# Patient Record
Sex: FEMALE | Employment: OTHER | URBAN - METROPOLITAN AREA
[De-identification: names, ages, dates, MRNs, and addresses within clinical notes are randomized per-mention and may not be internally consistent; named-entity substitution may affect disease eponyms.]

---

## 2019-08-22 LAB
EXT 24 HR UR METANEPHRINE: ABNORMAL
EXT 24 HR UR NORMETANEPHRINE: ABNORMAL
EXT 24 HR UR NORMETANEPHRINE: ABNORMAL
EXT 25 HYDROXY VIT D2: ABNORMAL
EXT 25 HYDROXY VIT D3: ABNORMAL
EXT 5 HIAA 24 HR URINE: ABNORMAL
EXT 5 HIAA BLOOD: ABNORMAL
EXT ACTH: ABNORMAL
EXT AFP: ABNORMAL
EXT ALBUMIN: ABNORMAL
EXT ALKALINE PHOSPHATASE: 292 IU/L (ref 46–116)
EXT ALT: 38 IU/L (ref 12–78)
EXT AMYLASE: ABNORMAL
EXT ANTI ISLET CELL AB: ABNORMAL
EXT ANTI PARIETAL CELL AB: ABNORMAL
EXT ANTI THYROID AB: ABNORMAL
EXT AST: 26 IU/L (ref 15–37)
EXT BILIRUBIN DIRECT: ABNORMAL MG/DL
EXT BILIRUBIN TOTAL: 0.4 MG/DL (ref 0.2–1)
EXT BK VIRUS DNA QN PCR: ABNORMAL
EXT BUN: 32 MG/DL (ref 7–18)
EXT C PEPTIDE: ABNORMAL
EXT CA 125: ABNORMAL
EXT CA 19-9: ABNORMAL
EXT CA 27-29: ABNORMAL
EXT CALCITONIN: ABNORMAL
EXT CALCIUM: 9.9 MG/DL (ref 9.5–10.1)
EXT CEA: ABNORMAL
EXT CHLORIDE: ABNORMAL
EXT CHOLESTEROL: ABNORMAL
EXT CHROMOGRANIN A: ABNORMAL
EXT CO2: 30 MMOL/L (ref 21–32)
EXT CREATININE UA: ABNORMAL
EXT CREATININE: 1.9 MG/DL (ref 0.6–1.3)
EXT CYCLOSPORONE LEVEL: ABNORMAL
EXT DOPAMINE: ABNORMAL
EXT EBV DNA BY PCR: ABNORMAL
EXT EPINEPHRINE: ABNORMAL
EXT FOLATE: ABNORMAL
EXT FREE T3: ABNORMAL
EXT FREE T4: ABNORMAL
EXT FSH: ABNORMAL
EXT GASTRIN RELEASING PEPTIDE: ABNORMAL
EXT GASTRIN RELEASING PEPTIDE: ABNORMAL
EXT GASTRIN: ABNORMAL
EXT GGT: ABNORMAL
EXT GHRELIN: ABNORMAL
EXT GLUCAGON: ABNORMAL
EXT GLUCOSE: 88 MG/DL (ref 74–106)
EXT GROWTH HORMONE: ABNORMAL
EXT HCV RNA QUANT PCR: ABNORMAL
EXT HDL: ABNORMAL
EXT HEMATOCRIT: 35 % (ref 37–47)
EXT HEMOGLOBIN A1C: ABNORMAL %
EXT HEMOGLOBIN: 11.2 GM/DL (ref 12–16)
EXT HISTAMINE 24 HR URINE: ABNORMAL
EXT HISTAMINE: ABNORMAL
EXT IGF-1: ABNORMAL
EXT IMMUNKNOW (NON-STIMULATED): ABNORMAL
EXT IMMUNKNOW (STIMULATED): ABNORMAL
EXT INR: ABNORMAL
EXT INSULIN: ABNORMAL
EXT LANREOTIDE LEVEL: ABNORMAL
EXT LDH, TOTAL: ABNORMAL
EXT LDL CHOLESTEROL: ABNORMAL
EXT LIPASE: 222 IU/L (ref 73–393)
EXT MAGNESIUM: ABNORMAL
EXT METANEPHRINE FREE PLASMA: ABNORMAL
EXT MOTILIN: ABNORMAL
EXT NEUROKININ A CAMB: ABNORMAL
EXT NEUROKININ A ISI: ABNORMAL
EXT NEUROTENSIN: ABNORMAL
EXT NOREPINEPHRINE: ABNORMAL
EXT NORMETANEPHRINE: ABNORMAL
EXT NSE: ABNORMAL
EXT OCTREOTIDE LEVEL: ABNORMAL
EXT PANCREASTATIN CAMB: ABNORMAL
EXT PANCREASTATIN ISI: ABNORMAL
EXT PANCREATIC POLYPEPTIDE: ABNORMAL
EXT PHOSPHORUS: ABNORMAL
EXT PLATELETS: 293 K/UL (ref 140–450)
EXT POTASSIUM: 4.2 MMOL/L (ref 3.5–5.2)
EXT PROGRAF LEVEL: ABNORMAL
EXT PROLACTIN: ABNORMAL
EXT PROTEIN TOTAL: 8.8 G/DL (ref 6.4–8.2)
EXT PROTEIN UA: ABNORMAL
EXT PT: ABNORMAL
EXT PTH, INTACT: ABNORMAL
EXT PTT: ABNORMAL
EXT RAPAMUNE LEVEL: ABNORMAL
EXT SEROTONIN: ABNORMAL
EXT SODIUM: 140 MMOL/L (ref 136–145)
EXT SOMATOSTATIN: ABNORMAL
EXT SUBSTANCE P: ABNORMAL
EXT TRIGLYCERIDES: ABNORMAL
EXT TRYPTASE: ABNORMAL
EXT TSH: ABNORMAL
EXT URIC ACID: ABNORMAL
EXT URINE AMYLASE U/HR: ABNORMAL
EXT URINE AMYLASE U/L: ABNORMAL
EXT VASOACTIVE INTESTINAL POLYPEPTIDE: ABNORMAL
EXT VITAMIN B12: ABNORMAL
EXT VMA 24 HR URINE: ABNORMAL
EXT WBC: 11.1 K/UL (ref 4.8–10.8)
NEURON SPECIFIC ENOLASE: ABNORMAL

## 2019-09-11 LAB
EXT 24 HR UR METANEPHRINE: ABNORMAL
EXT 24 HR UR NORMETANEPHRINE: ABNORMAL
EXT 24 HR UR NORMETANEPHRINE: ABNORMAL
EXT 25 HYDROXY VIT D2: ABNORMAL
EXT 25 HYDROXY VIT D3: ABNORMAL
EXT 5 HIAA 24 HR URINE: ABNORMAL
EXT 5 HIAA BLOOD: ABNORMAL
EXT ACTH: ABNORMAL
EXT AFP: ABNORMAL
EXT ALBUMIN: 3.3
EXT ALKALINE PHOSPHATASE: 284
EXT ALT: 23
EXT AMYLASE: ABNORMAL
EXT ANTI ISLET CELL AB: ABNORMAL
EXT ANTI PARIETAL CELL AB: ABNORMAL
EXT ANTI THYROID AB: ABNORMAL
EXT AST: 33
EXT BILIRUBIN DIRECT: ABNORMAL
EXT BILIRUBIN TOTAL: 0.4
EXT BK VIRUS DNA QN PCR: ABNORMAL
EXT BUN: 33
EXT C PEPTIDE: ABNORMAL
EXT CA 125: ABNORMAL
EXT CA 19-9: 81
EXT CA 27-29: ABNORMAL
EXT CALCITONIN: ABNORMAL
EXT CALCIUM: 8.3
EXT CEA: 5.4
EXT CHLORIDE: 95
EXT CHOLESTEROL: ABNORMAL
EXT CHROMOGRANIN A: ABNORMAL
EXT CO2: 32
EXT CREATININE UA: ABNORMAL
EXT CREATININE: 2.1 MG/DL
EXT CYCLOSPORONE LEVEL: ABNORMAL
EXT DOPAMINE: ABNORMAL
EXT EBV DNA BY PCR: ABNORMAL
EXT EPINEPHRINE: ABNORMAL
EXT FOLATE: ABNORMAL
EXT FREE T3: ABNORMAL
EXT FREE T4: ABNORMAL
EXT FSH: ABNORMAL
EXT GASTRIN RELEASING PEPTIDE: ABNORMAL
EXT GASTRIN RELEASING PEPTIDE: ABNORMAL
EXT GASTRIN: ABNORMAL
EXT GGT: ABNORMAL
EXT GHRELIN: ABNORMAL
EXT GLUCAGON: ABNORMAL
EXT GLUCOSE: 164
EXT GROWTH HORMONE: ABNORMAL
EXT HCV RNA QUANT PCR: ABNORMAL
EXT HDL: ABNORMAL
EXT HEMATOCRIT: 32.7
EXT HEMOGLOBIN A1C: ABNORMAL
EXT HEMOGLOBIN: 10.4
EXT HISTAMINE 24 HR URINE: ABNORMAL
EXT HISTAMINE: ABNORMAL
EXT IGF-1: ABNORMAL
EXT IMMUNKNOW (NON-STIMULATED): ABNORMAL
EXT IMMUNKNOW (STIMULATED): ABNORMAL
EXT INR: ABNORMAL
EXT INSULIN: ABNORMAL
EXT LANREOTIDE LEVEL: ABNORMAL
EXT LDH, TOTAL: ABNORMAL
EXT LDL CHOLESTEROL: ABNORMAL
EXT LIPASE: ABNORMAL
EXT MAGNESIUM: ABNORMAL
EXT METANEPHRINE FREE PLASMA: ABNORMAL
EXT MOTILIN: ABNORMAL
EXT NEUROKININ A CAMB: ABNORMAL
EXT NEUROKININ A ISI: ABNORMAL
EXT NEUROTENSIN: ABNORMAL
EXT NOREPINEPHRINE: ABNORMAL
EXT NORMETANEPHRINE: ABNORMAL
EXT NSE: ABNORMAL
EXT OCTREOTIDE LEVEL: ABNORMAL
EXT PANCREASTATIN CAMB: ABNORMAL
EXT PANCREASTATIN ISI: ABNORMAL
EXT PANCREATIC POLYPEPTIDE: ABNORMAL
EXT PHOSPHORUS: ABNORMAL
EXT PLATELETS: 287
EXT POTASSIUM: 3.1
EXT PROGRAF LEVEL: ABNORMAL
EXT PROLACTIN: ABNORMAL
EXT PROTEIN TOTAL: 7.5
EXT PROTEIN UA: ABNORMAL
EXT PT: ABNORMAL
EXT PTH, INTACT: ABNORMAL
EXT PTT: ABNORMAL
EXT RAPAMUNE LEVEL: ABNORMAL
EXT SEROTONIN: ABNORMAL
EXT SODIUM: 139 MMOL/L
EXT SOMATOSTATIN: ABNORMAL
EXT SUBSTANCE P: ABNORMAL
EXT TRIGLYCERIDES: ABNORMAL
EXT TRYPTASE: ABNORMAL
EXT TSH: ABNORMAL
EXT URIC ACID: ABNORMAL
EXT URINE AMYLASE U/HR: ABNORMAL
EXT URINE AMYLASE U/L: ABNORMAL
EXT VASOACTIVE INTESTINAL POLYPEPTIDE: ABNORMAL
EXT VITAMIN B12: ABNORMAL
EXT VMA 24 HR URINE: ABNORMAL
EXT WBC: 9.03
NEURON SPECIFIC ENOLASE: ABNORMAL

## 2019-09-13 ENCOUNTER — TELEPHONE (OUTPATIENT)
Dept: NEUROLOGY | Facility: HOSPITAL | Age: 79
End: 2019-09-13

## 2019-09-13 DIAGNOSIS — C7A.8 PRIMARY MALIGNANT NEUROENDOCRINE NEOPLASM OF PANCREAS: Primary | ICD-10-CM

## 2019-09-13 RX ORDER — DICYCLOMINE HYDROCHLORIDE 20 MG/1
20 TABLET ORAL EVERY 6 HOURS
Status: ON HOLD | COMMUNITY
End: 2019-09-30 | Stop reason: HOSPADM

## 2019-09-13 RX ORDER — NIFEDIPINE 30 MG/1
30 TABLET, FILM COATED, EXTENDED RELEASE ORAL DAILY
COMMUNITY

## 2019-09-13 RX ORDER — BUDESONIDE AND FORMOTEROL FUMARATE DIHYDRATE 160; 4.5 UG/1; UG/1
2 AEROSOL RESPIRATORY (INHALATION) EVERY 12 HOURS
COMMUNITY

## 2019-09-13 RX ORDER — HYDRALAZINE HYDROCHLORIDE 10 MG/1
10 TABLET, FILM COATED ORAL 3 TIMES DAILY
COMMUNITY
End: 2019-10-14

## 2019-09-13 RX ORDER — LOSARTAN POTASSIUM 100 MG/1
100 TABLET ORAL DAILY
COMMUNITY

## 2019-09-13 RX ORDER — FUROSEMIDE 20 MG/1
20 TABLET ORAL 2 TIMES DAILY
COMMUNITY

## 2019-09-13 RX ORDER — AMITRIPTYLINE HYDROCHLORIDE 25 MG/1
25 TABLET, FILM COATED ORAL NIGHTLY PRN
COMMUNITY

## 2019-09-13 RX ORDER — HYDROCODONE BITARTRATE AND ACETAMINOPHEN 5; 325 MG/1; MG/1
1 TABLET ORAL EVERY 6 HOURS PRN
COMMUNITY

## 2019-09-13 RX ORDER — CARVEDILOL 6.25 MG/1
6.25 TABLET ORAL 2 TIMES DAILY WITH MEALS
Status: ON HOLD | COMMUNITY
End: 2019-09-30 | Stop reason: HOSPADM

## 2019-09-13 RX ORDER — CALCITRIOL 0.5 UG/1
0.5 CAPSULE ORAL DAILY
COMMUNITY

## 2019-09-13 NOTE — TELEPHONE ENCOUNTER
New PNET referral from Dr. Thacker.  She presented to the ER with abdominal pain.  CT scan without contrast showed an ill defined soft tissue mass in the richard hepatis and pancreatic region measuring 4.8 cm.  Pt reports she had a panc bx via EUS in [place] at [facility].  No path avail yet.  She is also scheduled for a brain MRI and another scan CT chest next week.  Ordered ga 68/ pet/ct scan and path re read per protocol.  Sent to Prabha for scheduling asap.

## 2019-09-13 NOTE — TELEPHONE ENCOUNTER
----- Message from Nicole Carrero sent at 9/11/2019 10:58 AM CDT -----  Regarding: New Patient  Contact: [phone]  New Patient: Yomaira    Referred By: Dr. Randall Thacker    Why do you need to be seen? Neuro Endocrine Tumor of the Pancreas    Which doctor are you requesting? First Available    You may contact pt back to schedule at: [phone] or [phone]

## 2019-09-19 NOTE — PROGRESS NOTES
[facility]OLAN[facility]:  [facility]  A collaboration between [facility] and [facility]      PATIENT: Nidia Martel  MRN: [id number]  DATE: 9/19/2019    Subjective:      Chief Complaint: Consult for pancreatic neuroendocrine tumor.  Review most recent imaging and blood work results. Establish surveillance and treatment plan.     Overview / HPI: This nice lady presented to the emergency department with abdominal pain and shortness of breath.  According to her and her family she was evaluated with blood work, an EKG, and a CT scan of the abdomen. Seen that they discharged her from the emergency department, my understanding was that they did not find anything on her EKG or blood work that would suggest that she had a decompensation of 1 or more of her chronic medical problems.  However the CT scan the abdomen pelvis reportedly showed a mass in the richard hepatis contiguous with the neck or body of the pancreas concerning for malignancy.  She was discharged from the emergency department with instructions to follow up with a gastroenterologist.  She underwent an upper endoscopy with an endoscopic ultrasound and biopsy of the pancreatic mass.  I do not have a description of that procedure. I do have a pathology report that is suggestive of a poorly differentiated carcinoma with neuroendocrine features.   She was then sent to a Dr. Thacker, a medical oncologist, who discussed the neuroendocrine features of the case and referred her to our known at its program for a 2nd opinion on management.       She has a significant past medical history of congestive heart failure for which she has been hospitalized and put on intermittent oxygen.  Her current performance status is approximately 50%.         Interval History:   Recent testing includes Ct abd/pelvis (9/2019).    Vitals: There were no vitals taken for this visit. --stable  ECOG Score: 3 -  Symptomatic, >50% confined to bed    Oncologic History:   Oncologic History PNET- dx-8/2019   Oncologic Treatment    Pathology 8/2019- panc bx- poorly diff carcinoma with net features     Past Medical History:  Past Medical History:   Diagnosis Date    Arthritis     CKD (chronic kidney disease)     DM (diabetes mellitus)     GERD (gastroesophageal reflux disease)     HTN (hypertension)     Hyperlipidemia     Primary malignant neuroendocrine neoplasm of pancreas        Past Surgical History:  No past surgical history on file.    Family History:  No family history on file.    Allergies:  Epinephrine    Medications:  Current Outpatient Medications   Medication Sig    amitriptyline (ELAVIL) 25 MG tablet Take 25 mg by mouth nightly as needed for Insomnia.    budesonide-formoterol 160-4.5 mcg (SYMBICORT) 160-4.5 mcg/actuation HFAA Inhale 2 puffs into the lungs every 12 (twelve) hours. Controller    calcitRIOL (ROCALTROL) 0.5 MCG Cap Take 0.5 mcg by mouth once daily.    carvedilol (COREG) 6.25 MG tablet Take 6.25 mg by mouth 2 (two) times daily with meals.    dicyclomine (BENTYL) 20 mg tablet Take 20 mg by mouth every 6 (six) hours.    furosemide (LASIX) 20 MG tablet Take 20 mg by mouth 2 (two) times daily.    hydrALAZINE (APRESOLINE) 10 MG tablet Take 10 mg by mouth 3 (three) times daily.    HYDROcodone-acetaminophen (NORCO) 5-325 mg per tablet Take 1 tablet by mouth every 6 (six) hours as needed for Pain.    linaGLIPtin (TRADJENTA) 5 mg Tab tablet Take 5 mg by mouth once daily.    losartan (COZAAR) 100 MG tablet Take 100 mg by mouth once daily.    NIFEdipine (ADALAT CC) 30 MG TbSR Take 30 mg by mouth once daily.    ranitidine (ZANTAC) 150 MG capsule Take 150 mg by mouth 2 (two) times daily.     No current facility-administered medications for this visit.         Review of Systems   Constitutional: Positive for activity change, appetite change and fatigue.          Objective:      Physical Exam     Lab  Data:  Neuroendocrine Labs Latest Ref Rng & Units 8/22/2019   EXT WBC 4.8 - 10.8 k/ul 11.1 (A)   EXT HGB 12.0 - 16.0 gm/dl 11.2 (A)   EXT HCT 37 - 47 % 35.0 (A)   EXT PLATLETS 140 - 450 k/ul 293   EXT GLUCOSE 74 - 106 mg/dl 88   EXT BUN 7 - 18 mg/dl 32 (A)   EXT CREATININE 0.6 - 1.3 mg/dL 1.9 (A)   EXT  - 145 mmol/L 140   EXT K 3.5 - 5.2 mmol/l 4.2   EXT CO2 21 - 32 mmol/l 30   EXT CALCIUM 9.5 - 10.1 mg/dl 9.9   EXT PROTEIN, TOTAL 6.4 - 8.2 g/dl 8.8 (A)   EXT TOTAL BILIRUBIN 0.2 - 1.0 mg/dl 0.4   EXT ALK PHOSPHATASE 46 - 116 iu/l 292 (A)   EXT SGOT (AST) 15 - 37 iu/l 26   EXT ALT 12 - 78 iu/l 38   EXT LIPASE 73 - 393 iu/l 222       Scans:   CT abd/pelvis wo constrst:-9/11/19             Assessment/Impression:         Problem List Items Addressed This Visit     None           Plan:       I had a long conversation with the patient and her family about the evaluation and potential treatment recommendations outlined below:  1.  The pathology report showing a poorly differentiated tumor with neuroendocrine features is the most telling piece of information that we received on this case. An adenocarcinoma of the pancreas with neuroendocrine features would be a high-grade cancer with a very guarded prognosis despite therapy.  A high-grade neuroendocrine tumor of the pancreas would also come with tempered expectations on treatment response in overall survival.  It is imperative that we pull that biopsy specimen in and have it read by pathologist that are familiar with both pancreatic cancer as well as neuroendocrine carcinoma to get a better understanding of what treatment would be more or less appropriate.  2.  We have a report but no images from a noncontrast CT scan of the abdomen. This would be unable to determine resectability of a cancer in this area. Given the size of the tumor and location of the tumor within the richard hepatis, there would be a moderate to high likelihood that the case would be borderline if  not unresectable.  If she has comorbid conditions that prohibit a contrast enhanced scan, this would be an additional recent Y surgical intervention would be associated with substantial risk of prolonged recovery.  It is imperative that we have additional imaging to better understand the extent of disease as well as the biologic behavior.  3.  Given she was referred with a pancreatic neuroendocrine tumor, and prior to our having the pathology report, she was due to get a gallium 68 somatostatin receptor PET.  For variety of reasons this was not done and I am seeing her with additional information that would suggest that that would be less likely to give us valuable information than an FDG PET for a higher grade tumor.  I explained to the family that FDG PET is primarily utilized for higher grade tumors to both understand local regional involvement and metastatic disease.  If she cannot get IV contrast to better understand local regional involvement from a surgical standpoint, a PET-CT would be the next best option for evaluating several aspects of the tumor including differentiation status extent of local regional disease and evidence of metastatic disease for which surgical intervention would be unwarranted.  4.  She has a number of comorbid conditions of which a history of decompensated congestive heart failure would be the most significant when discussing a life-limiting problem like a pancreatic cancer.  She is having worsening shortness of breath and intermittent lower extremity peripheral edema despite taking her medications.  Her performance status is at around 50%, she is coming into the office in a wheelchair, she is short of breath at rest, and I think that for a number of reasons she would be better served coming into the hospital for optimization of her more comorbid conditions.  She will need a cardiac evaluation including an echocardiogram and potentially a nuclear medicine stress test. She may need to  be rescoped with the intention of understanding the relationship of the CT scan abnormalities to the superior mesenteric artery and vein as well as an attempt to re-biopsy the abnormality.  We will need to either track down her CT scan done at the outside facility or reimage her to better understand how that tumor and its location are contributing to her symptoms and whether not we have any palliative treatment options for her in the short term.  If a short stay in the hospital with a medical team trying to optimize her comorbid conditions, we would be able to get additional important information that would allow us to better understand her short-term and longer-term treatment options.  At this juncture, I do not have any convincing evidence that surgical resection would be in her best interest.    Admit to the hospital  Cardiopulmonary evaluation  A weighted care consult  Consider contrast-enhanced CT scan versus MRCP  FDG PET      Toshia Jamison MD, MPH, FACS  Professor of Surgery, [facility]  Neuroendocrine Surgery, Hepatic/Pancreatic & General Surgical Oncology  [address], [address]  [place]  [address]  ph. [phone]; [phone]  fax. [phone]

## 2019-09-20 ENCOUNTER — HOSPITAL ENCOUNTER (INPATIENT)
Facility: HOSPITAL | Age: 79
LOS: 10 days | Discharge: HOME | DRG: 829 | End: 2019-09-30
Attending: SURGERY | Admitting: SURGERY
Payer: MEDICARE

## 2019-09-20 ENCOUNTER — OFFICE VISIT (OUTPATIENT)
Dept: NEUROLOGY | Facility: HOSPITAL | Age: 79
DRG: 829 | End: 2019-09-20
Attending: SURGERY
Payer: MEDICARE

## 2019-09-20 VITALS
WEIGHT: 174.19 LBS | HEIGHT: 65 IN | DIASTOLIC BLOOD PRESSURE: 71 MMHG | TEMPERATURE: 98 F | BODY MASS INDEX: 29.02 KG/M2 | HEART RATE: 78 BPM | SYSTOLIC BLOOD PRESSURE: 146 MMHG

## 2019-09-20 DIAGNOSIS — K86.89 PANCREATIC MASS: ICD-10-CM

## 2019-09-20 DIAGNOSIS — N18.30 TYPE 2 DIABETES MELLITUS WITH STAGE 3 CHRONIC KIDNEY DISEASE, WITH LONG-TERM CURRENT USE OF INSULIN: ICD-10-CM

## 2019-09-20 DIAGNOSIS — Z79.4 TYPE 2 DIABETES MELLITUS WITH STAGE 3 CHRONIC KIDNEY DISEASE, WITH LONG-TERM CURRENT USE OF INSULIN: ICD-10-CM

## 2019-09-20 DIAGNOSIS — I50.9 CONGESTIVE HEART FAILURE, UNSPECIFIED HF CHRONICITY, UNSPECIFIED HEART FAILURE TYPE: ICD-10-CM

## 2019-09-20 DIAGNOSIS — E11.22 TYPE 2 DIABETES MELLITUS WITH STAGE 3 CHRONIC KIDNEY DISEASE, WITH LONG-TERM CURRENT USE OF INSULIN: ICD-10-CM

## 2019-09-20 DIAGNOSIS — I50.9 CONGESTIVE HEART FAILURE: ICD-10-CM

## 2019-09-20 DIAGNOSIS — K86.89 PANCREATIC MASS: Primary | ICD-10-CM

## 2019-09-20 DIAGNOSIS — E46 MALNUTRITION COMPROMISING BODILY FUNCTION: ICD-10-CM

## 2019-09-20 DIAGNOSIS — N28.9 RENAL INSUFFICIENCY: ICD-10-CM

## 2019-09-20 LAB
ALBUMIN SERPL BCP-MCNC: 2.8 G/DL (ref 3.5–5.2)
ALP SERPL-CCNC: 225 U/L (ref 55–135)
ALT SERPL W/O P-5'-P-CCNC: 12 U/L (ref 10–44)
ANION GAP SERPL CALC-SCNC: 14 MMOL/L (ref 8–16)
AST SERPL-CCNC: 30 U/L (ref 10–40)
BASOPHILS # BLD AUTO: 0.01 K/UL (ref 0–0.2)
BASOPHILS NFR BLD: 0.1 % (ref 0–1.9)
BILIRUB SERPL-MCNC: 0.4 MG/DL (ref 0.1–1)
BUN SERPL-MCNC: 43 MG/DL (ref 8–23)
CALCIUM SERPL-MCNC: 8.7 MG/DL (ref 8.7–10.5)
CHLORIDE SERPL-SCNC: 104 MMOL/L (ref 95–110)
CO2 SERPL-SCNC: 22 MMOL/L (ref 23–29)
CREAT SERPL-MCNC: 1.9 MG/DL (ref 0.5–1.4)
DIFFERENTIAL METHOD: ABNORMAL
EOSINOPHIL # BLD AUTO: 0.1 K/UL (ref 0–0.5)
EOSINOPHIL NFR BLD: 1.2 % (ref 0–8)
ERYTHROCYTE [DISTWIDTH] IN BLOOD BY AUTOMATED COUNT: 15.8 % (ref 11.5–14.5)
EST. GFR  (AFRICAN AMERICAN): 28 ML/MIN/1.73 M^2
EST. GFR  (NON AFRICAN AMERICAN): 25 ML/MIN/1.73 M^2
GLUCOSE SERPL-MCNC: 162 MG/DL (ref 70–110)
HCT VFR BLD AUTO: 34 % (ref 37–48.5)
HGB BLD-MCNC: 10.9 G/DL (ref 12–16)
HYPOCHROMIA BLD QL SMEAR: ABNORMAL
LYMPHOCYTES # BLD AUTO: 0.8 K/UL (ref 1–4.8)
LYMPHOCYTES NFR BLD: 9.8 % (ref 18–48)
MCH RBC QN AUTO: 27.3 PG (ref 27–31)
MCHC RBC AUTO-ENTMCNC: 32.1 G/DL (ref 32–36)
MCV RBC AUTO: 85 FL (ref 82–98)
MONOCYTES # BLD AUTO: 1.2 K/UL (ref 0.3–1)
MONOCYTES NFR BLD: 14.9 % (ref 4–15)
NEUTROPHILS # BLD AUTO: 6 K/UL (ref 1.8–7.7)
NEUTROPHILS NFR BLD: 74 % (ref 38–73)
PLATELET # BLD AUTO: 271 K/UL (ref 150–350)
PLATELET BLD QL SMEAR: ABNORMAL
PMV BLD AUTO: 10.7 FL (ref 9.2–12.9)
POCT GLUCOSE: 125 MG/DL (ref 70–110)
POCT GLUCOSE: 148 MG/DL (ref 70–110)
POCT GLUCOSE: 96 MG/DL (ref 70–110)
POLYCHROMASIA BLD QL SMEAR: ABNORMAL
POTASSIUM SERPL-SCNC: 2.9 MMOL/L (ref 3.5–5.1)
PROT SERPL-MCNC: 8.1 G/DL (ref 6–8.4)
RBC # BLD AUTO: 3.99 M/UL (ref 4–5.4)
SODIUM SERPL-SCNC: 140 MMOL/L (ref 136–145)
WBC # BLD AUTO: 8.18 K/UL (ref 3.9–12.7)

## 2019-09-20 PROCEDURE — 21400001 HC TELEMETRY ROOM

## 2019-09-20 PROCEDURE — 25000003 PHARM REV CODE 250: Performed by: STUDENT IN AN ORGANIZED HEALTH CARE EDUCATION/TRAINING PROGRAM

## 2019-09-20 PROCEDURE — 80053 COMPREHEN METABOLIC PANEL: CPT

## 2019-09-20 PROCEDURE — 36415 COLL VENOUS BLD VENIPUNCTURE: CPT

## 2019-09-20 PROCEDURE — 99214 OFFICE O/P EST MOD 30 MIN: CPT | Performed by: SURGERY

## 2019-09-20 PROCEDURE — 85025 COMPLETE CBC W/AUTO DIFF WBC: CPT

## 2019-09-20 RX ORDER — IBUPROFEN 200 MG
16 TABLET ORAL
Status: DISCONTINUED | OUTPATIENT
Start: 2019-09-20 | End: 2019-09-21 | Stop reason: SDUPTHER

## 2019-09-20 RX ORDER — SODIUM CHLORIDE 0.9 % (FLUSH) 0.9 %
10 SYRINGE (ML) INJECTION
Status: DISCONTINUED | OUTPATIENT
Start: 2019-09-20 | End: 2019-09-23

## 2019-09-20 RX ORDER — CARVEDILOL 6.25 MG/1
6.25 TABLET ORAL 2 TIMES DAILY WITH MEALS
Status: DISCONTINUED | OUTPATIENT
Start: 2019-09-20 | End: 2019-09-21

## 2019-09-20 RX ORDER — GLUCAGON 1 MG
1 KIT INJECTION
Status: DISCONTINUED | OUTPATIENT
Start: 2019-09-20 | End: 2019-09-21 | Stop reason: SDUPTHER

## 2019-09-20 RX ORDER — FAMOTIDINE 20 MG/1
20 TABLET, FILM COATED ORAL 2 TIMES DAILY
Status: DISCONTINUED | OUTPATIENT
Start: 2019-09-20 | End: 2019-09-21 | Stop reason: DRUGHIGH

## 2019-09-20 RX ORDER — DEXTROSE 50 % IN WATER (D50W) INTRAVENOUS SYRINGE
25
Status: DISCONTINUED | OUTPATIENT
Start: 2019-09-20 | End: 2019-09-30 | Stop reason: HOSPADM

## 2019-09-20 RX ORDER — INSULIN ASPART 100 [IU]/ML
0-5 INJECTION, SOLUTION INTRAVENOUS; SUBCUTANEOUS
Status: DISCONTINUED | OUTPATIENT
Start: 2019-09-20 | End: 2019-09-21

## 2019-09-20 RX ORDER — HYDROCODONE BITARTRATE AND ACETAMINOPHEN 5; 325 MG/1; MG/1
1 TABLET ORAL EVERY 6 HOURS PRN
Status: DISCONTINUED | OUTPATIENT
Start: 2019-09-20 | End: 2019-09-30 | Stop reason: HOSPADM

## 2019-09-20 RX ORDER — ASPIRIN 325 MG
81 TABLET ORAL DAILY
COMMUNITY

## 2019-09-20 RX ORDER — IBUPROFEN 200 MG
24 TABLET ORAL
Status: DISCONTINUED | OUTPATIENT
Start: 2019-09-20 | End: 2019-09-21 | Stop reason: SDUPTHER

## 2019-09-20 RX ORDER — LOSARTAN POTASSIUM 50 MG/1
100 TABLET ORAL DAILY
Status: DISCONTINUED | OUTPATIENT
Start: 2019-09-21 | End: 2019-09-21

## 2019-09-20 RX ORDER — ACETAMINOPHEN 325 MG/1
650 TABLET ORAL EVERY 6 HOURS PRN
Status: DISCONTINUED | OUTPATIENT
Start: 2019-09-20 | End: 2019-09-30 | Stop reason: HOSPADM

## 2019-09-20 RX ORDER — NIFEDIPINE 30 MG/1
30 TABLET, EXTENDED RELEASE ORAL DAILY
Status: DISCONTINUED | OUTPATIENT
Start: 2019-09-21 | End: 2019-09-30 | Stop reason: HOSPADM

## 2019-09-20 RX ORDER — INSULIN GLARGINE 100 [IU]/ML
20 INJECTION, SOLUTION SUBCUTANEOUS
Status: ON HOLD | COMMUNITY
End: 2019-09-30 | Stop reason: HOSPADM

## 2019-09-20 RX ORDER — FLUTICASONE FUROATE AND VILANTEROL 100; 25 UG/1; UG/1
1 POWDER RESPIRATORY (INHALATION) DAILY
Status: DISCONTINUED | OUTPATIENT
Start: 2019-09-21 | End: 2019-09-30 | Stop reason: HOSPADM

## 2019-09-20 RX ORDER — DEXTROSE 50 % IN WATER (D50W) INTRAVENOUS SYRINGE
12.5
Status: DISCONTINUED | OUTPATIENT
Start: 2019-09-20 | End: 2019-09-30 | Stop reason: HOSPADM

## 2019-09-20 RX ORDER — HYDRALAZINE HYDROCHLORIDE 10 MG/1
10 TABLET, FILM COATED ORAL 3 TIMES DAILY
Status: DISCONTINUED | OUTPATIENT
Start: 2019-09-20 | End: 2019-09-21

## 2019-09-20 RX ORDER — ASPIRIN 325 MG
325 TABLET ORAL DAILY
Status: DISCONTINUED | OUTPATIENT
Start: 2019-09-21 | End: 2019-09-22

## 2019-09-20 RX ORDER — ERGOCALCIFEROL 1.25 MG/1
50000 CAPSULE ORAL DAILY
COMMUNITY

## 2019-09-20 RX ADMIN — FAMOTIDINE 20 MG: 20 TABLET, FILM COATED ORAL at 08:09

## 2019-09-20 RX ADMIN — CARVEDILOL 6.25 MG: 6.25 TABLET, FILM COATED ORAL at 05:09

## 2019-09-20 RX ADMIN — HYDRALAZINE HYDROCHLORIDE 10 MG: 10 TABLET, FILM COATED ORAL at 08:09

## 2019-09-20 NOTE — PLAN OF CARE
Call placed to team. Dr Acosta made aware of arrival to unit. Team currently in surgery, no new orders taken at this time. VN to monitor.

## 2019-09-21 PROBLEM — E66.09 OBESITY DUE TO EXCESS CALORIES WITH SERIOUS COMORBIDITY: Status: ACTIVE | Noted: 2019-09-21

## 2019-09-21 PROBLEM — I15.2 HYPERTENSION DUE TO ENDOCRINE DISORDER: Status: ACTIVE | Noted: 2019-09-21

## 2019-09-21 PROBLEM — N28.9 RENAL INSUFFICIENCY: Status: ACTIVE | Noted: 2019-09-21

## 2019-09-21 PROBLEM — Z79.4 TYPE 2 DIABETES MELLITUS WITH CHRONIC KIDNEY DISEASE, WITH LONG-TERM CURRENT USE OF INSULIN: Status: ACTIVE | Noted: 2019-09-21

## 2019-09-21 PROBLEM — E11.22 TYPE 2 DIABETES MELLITUS WITH CHRONIC KIDNEY DISEASE, WITH LONG-TERM CURRENT USE OF INSULIN: Status: ACTIVE | Noted: 2019-09-21

## 2019-09-21 LAB
ALBUMIN SERPL BCP-MCNC: 2.9 G/DL (ref 3.5–5.2)
ALP SERPL-CCNC: 217 U/L (ref 55–135)
ALT SERPL W/O P-5'-P-CCNC: 12 U/L (ref 10–44)
ANION GAP SERPL CALC-SCNC: 13 MMOL/L (ref 8–16)
AST SERPL-CCNC: 28 U/L (ref 10–40)
BACTERIA #/AREA URNS HPF: ABNORMAL /HPF
BASOPHILS # BLD AUTO: 0.01 K/UL (ref 0–0.2)
BASOPHILS NFR BLD: 0.1 % (ref 0–1.9)
BILIRUB SERPL-MCNC: 0.5 MG/DL (ref 0.1–1)
BILIRUB UR QL STRIP: NEGATIVE
BNP SERPL-MCNC: 199 PG/ML (ref 0–99)
BUN SERPL-MCNC: 41 MG/DL (ref 8–23)
CALCIUM SERPL-MCNC: 8.8 MG/DL (ref 8.7–10.5)
CHLORIDE SERPL-SCNC: 103 MMOL/L (ref 95–110)
CLARITY UR: CLEAR
CO2 SERPL-SCNC: 25 MMOL/L (ref 23–29)
COLOR UR: YELLOW
CREAT SERPL-MCNC: 1.9 MG/DL (ref 0.5–1.4)
DIFFERENTIAL METHOD: ABNORMAL
EOSINOPHIL # BLD AUTO: 0.2 K/UL (ref 0–0.5)
EOSINOPHIL NFR BLD: 2.2 % (ref 0–8)
ERYTHROCYTE [DISTWIDTH] IN BLOOD BY AUTOMATED COUNT: 15.7 % (ref 11.5–14.5)
EST. GFR  (AFRICAN AMERICAN): 28 ML/MIN/1.73 M^2
EST. GFR  (NON AFRICAN AMERICAN): 25 ML/MIN/1.73 M^2
ESTIMATED AVG GLUCOSE: 131 MG/DL (ref 68–131)
GLUCOSE SERPL-MCNC: 136 MG/DL (ref 70–110)
GLUCOSE UR QL STRIP: NEGATIVE
HBA1C MFR BLD HPLC: 6.2 % (ref 4–5.6)
HCT VFR BLD AUTO: 32.6 % (ref 37–48.5)
HGB BLD-MCNC: 10.4 G/DL (ref 12–16)
HGB UR QL STRIP: NEGATIVE
HYALINE CASTS #/AREA URNS LPF: 0 /LPF
KETONES UR QL STRIP: NEGATIVE
LEUKOCYTE ESTERASE UR QL STRIP: NEGATIVE
LYMPHOCYTES # BLD AUTO: 1 K/UL (ref 1–4.8)
LYMPHOCYTES NFR BLD: 11.6 % (ref 18–48)
MAGNESIUM SERPL-MCNC: 1.5 MG/DL (ref 1.6–2.6)
MCH RBC QN AUTO: 26.7 PG (ref 27–31)
MCHC RBC AUTO-ENTMCNC: 31.9 G/DL (ref 32–36)
MCV RBC AUTO: 84 FL (ref 82–98)
MICROSCOPIC COMMENT: ABNORMAL
MONOCYTES # BLD AUTO: 1.6 K/UL (ref 0.3–1)
MONOCYTES NFR BLD: 18 % (ref 4–15)
NEUTROPHILS # BLD AUTO: 5.9 K/UL (ref 1.8–7.7)
NEUTROPHILS NFR BLD: 68.1 % (ref 38–73)
NITRITE UR QL STRIP: NEGATIVE
PH UR STRIP: 6 [PH] (ref 5–8)
PHOSPHATE SERPL-MCNC: 3.2 MG/DL (ref 2.7–4.5)
PLATELET # BLD AUTO: 305 K/UL (ref 150–350)
PMV BLD AUTO: 10.6 FL (ref 9.2–12.9)
POCT GLUCOSE: 152 MG/DL (ref 70–110)
POCT GLUCOSE: 172 MG/DL (ref 70–110)
POCT GLUCOSE: 181 MG/DL (ref 70–110)
POCT GLUCOSE: 88 MG/DL (ref 70–110)
POTASSIUM SERPL-SCNC: 2.9 MMOL/L (ref 3.5–5.1)
PREALB SERPL-MCNC: 14 MG/DL (ref 20–43)
PROT SERPL-MCNC: 8 G/DL (ref 6–8.4)
PROT UR QL STRIP: ABNORMAL
RBC # BLD AUTO: 3.89 M/UL (ref 4–5.4)
RBC #/AREA URNS HPF: 0 /HPF (ref 0–4)
SODIUM SERPL-SCNC: 141 MMOL/L (ref 136–145)
SP GR UR STRIP: 1.01 (ref 1–1.03)
URN SPEC COLLECT METH UR: ABNORMAL
UROBILINOGEN UR STRIP-ACNC: NEGATIVE EU/DL
WBC # BLD AUTO: 8.67 K/UL (ref 3.9–12.7)
WBC #/AREA URNS HPF: 60 /HPF (ref 0–5)
WBC CLUMPS URNS QL MICRO: ABNORMAL
YEAST URNS QL MICRO: ABNORMAL

## 2019-09-21 PROCEDURE — 84134 ASSAY OF PREALBUMIN: CPT

## 2019-09-21 PROCEDURE — 99900035 HC TECH TIME PER 15 MIN (STAT)

## 2019-09-21 PROCEDURE — 84100 ASSAY OF PHOSPHORUS: CPT

## 2019-09-21 PROCEDURE — 83735 ASSAY OF MAGNESIUM: CPT

## 2019-09-21 PROCEDURE — 63600175 PHARM REV CODE 636 W HCPCS: Performed by: SURGERY

## 2019-09-21 PROCEDURE — 93005 ELECTROCARDIOGRAM TRACING: CPT

## 2019-09-21 PROCEDURE — 25000242 PHARM REV CODE 250 ALT 637 W/ HCPCS: Performed by: STUDENT IN AN ORGANIZED HEALTH CARE EDUCATION/TRAINING PROGRAM

## 2019-09-21 PROCEDURE — P9047 ALBUMIN (HUMAN), 25%, 50ML: HCPCS | Mod: JG | Performed by: STUDENT IN AN ORGANIZED HEALTH CARE EDUCATION/TRAINING PROGRAM

## 2019-09-21 PROCEDURE — 81000 URINALYSIS NONAUTO W/SCOPE: CPT

## 2019-09-21 PROCEDURE — 83880 ASSAY OF NATRIURETIC PEPTIDE: CPT

## 2019-09-21 PROCEDURE — 87086 URINE CULTURE/COLONY COUNT: CPT

## 2019-09-21 PROCEDURE — 94799 UNLISTED PULMONARY SVC/PX: CPT

## 2019-09-21 PROCEDURE — 25000003 PHARM REV CODE 250: Performed by: STUDENT IN AN ORGANIZED HEALTH CARE EDUCATION/TRAINING PROGRAM

## 2019-09-21 PROCEDURE — 94761 N-INVAS EAR/PLS OXIMETRY MLT: CPT

## 2019-09-21 PROCEDURE — 21400001 HC TELEMETRY ROOM

## 2019-09-21 PROCEDURE — 63600175 PHARM REV CODE 636 W HCPCS: Performed by: STUDENT IN AN ORGANIZED HEALTH CARE EDUCATION/TRAINING PROGRAM

## 2019-09-21 PROCEDURE — 80053 COMPREHEN METABOLIC PANEL: CPT

## 2019-09-21 PROCEDURE — 94664 DEMO&/EVAL PT USE INHALER: CPT

## 2019-09-21 PROCEDURE — 85025 COMPLETE CBC W/AUTO DIFF WBC: CPT

## 2019-09-21 PROCEDURE — 27000221 HC OXYGEN, UP TO 24 HOURS

## 2019-09-21 PROCEDURE — 83036 HEMOGLOBIN GLYCOSYLATED A1C: CPT

## 2019-09-21 PROCEDURE — 27000646 HC AEROBIKA DEVICE

## 2019-09-21 PROCEDURE — 94640 AIRWAY INHALATION TREATMENT: CPT

## 2019-09-21 PROCEDURE — 36415 COLL VENOUS BLD VENIPUNCTURE: CPT

## 2019-09-21 RX ORDER — FUROSEMIDE 20 MG/1
20 TABLET ORAL 2 TIMES DAILY
Status: DISCONTINUED | OUTPATIENT
Start: 2019-09-21 | End: 2019-09-21

## 2019-09-21 RX ORDER — HEPARIN SODIUM 5000 [USP'U]/ML
5000 INJECTION, SOLUTION INTRAVENOUS; SUBCUTANEOUS EVERY 8 HOURS
Status: DISCONTINUED | OUTPATIENT
Start: 2019-09-21 | End: 2019-09-30 | Stop reason: HOSPADM

## 2019-09-21 RX ORDER — ALBUMIN HUMAN 250 G/1000ML
12.5 SOLUTION INTRAVENOUS ONCE
Status: COMPLETED | OUTPATIENT
Start: 2019-09-21 | End: 2019-09-21

## 2019-09-21 RX ORDER — POTASSIUM CHLORIDE 14.9 MG/ML
40 INJECTION INTRAVENOUS
Status: DISCONTINUED | OUTPATIENT
Start: 2019-09-21 | End: 2019-09-21

## 2019-09-21 RX ORDER — FAMOTIDINE 20 MG/1
20 TABLET, FILM COATED ORAL DAILY
Status: DISCONTINUED | OUTPATIENT
Start: 2019-09-22 | End: 2019-09-21

## 2019-09-21 RX ORDER — FUROSEMIDE 10 MG/ML
60 INJECTION INTRAMUSCULAR; INTRAVENOUS 2 TIMES DAILY
Status: DISCONTINUED | OUTPATIENT
Start: 2019-09-21 | End: 2019-09-21

## 2019-09-21 RX ORDER — FUROSEMIDE 10 MG/ML
40 INJECTION INTRAMUSCULAR; INTRAVENOUS ONCE
Status: DISCONTINUED | OUTPATIENT
Start: 2019-09-21 | End: 2019-09-21

## 2019-09-21 RX ORDER — GLUCAGON 1 MG
1 KIT INJECTION
Status: DISCONTINUED | OUTPATIENT
Start: 2019-09-21 | End: 2019-09-30 | Stop reason: HOSPADM

## 2019-09-21 RX ORDER — IBUPROFEN 200 MG
16 TABLET ORAL
Status: DISCONTINUED | OUTPATIENT
Start: 2019-09-21 | End: 2019-09-30 | Stop reason: HOSPADM

## 2019-09-21 RX ORDER — FUROSEMIDE 10 MG/ML
60 INJECTION INTRAMUSCULAR; INTRAVENOUS ONCE
Status: DISCONTINUED | OUTPATIENT
Start: 2019-09-21 | End: 2019-09-21

## 2019-09-21 RX ORDER — FUROSEMIDE 10 MG/ML
60 INJECTION INTRAMUSCULAR; INTRAVENOUS ONCE
Status: COMPLETED | OUTPATIENT
Start: 2019-09-21 | End: 2019-09-21

## 2019-09-21 RX ORDER — SPIRONOLACTONE 25 MG/1
50 TABLET ORAL DAILY
Status: DISCONTINUED | OUTPATIENT
Start: 2019-09-22 | End: 2019-09-24

## 2019-09-21 RX ORDER — IBUPROFEN 200 MG
24 TABLET ORAL
Status: DISCONTINUED | OUTPATIENT
Start: 2019-09-21 | End: 2019-09-30 | Stop reason: HOSPADM

## 2019-09-21 RX ORDER — POTASSIUM CHLORIDE 20 MEQ/1
40 TABLET, EXTENDED RELEASE ORAL
Status: COMPLETED | OUTPATIENT
Start: 2019-09-21 | End: 2019-09-21

## 2019-09-21 RX ORDER — FUROSEMIDE 10 MG/ML
60 INJECTION INTRAMUSCULAR; INTRAVENOUS DAILY
Status: DISCONTINUED | OUTPATIENT
Start: 2019-09-22 | End: 2019-09-21

## 2019-09-21 RX ORDER — POTASSIUM CHLORIDE 7.45 MG/ML
10 INJECTION INTRAVENOUS
Status: DISCONTINUED | OUTPATIENT
Start: 2019-09-21 | End: 2019-09-21

## 2019-09-21 RX ORDER — DOPAMINE HCL IN DEXTROSE 5 % 400MG/.25L
2 INFUSION BOTTLE (ML) INTRAVENOUS CONTINUOUS
Status: DISCONTINUED | OUTPATIENT
Start: 2019-09-21 | End: 2019-09-28

## 2019-09-21 RX ORDER — INSULIN ASPART 100 [IU]/ML
1-10 INJECTION, SOLUTION INTRAVENOUS; SUBCUTANEOUS
Status: DISCONTINUED | OUTPATIENT
Start: 2019-09-21 | End: 2019-09-30 | Stop reason: HOSPADM

## 2019-09-21 RX ORDER — FUROSEMIDE 10 MG/ML
40 INJECTION INTRAMUSCULAR; INTRAVENOUS DAILY
Status: DISCONTINUED | OUTPATIENT
Start: 2019-09-22 | End: 2019-09-24

## 2019-09-21 RX ORDER — CARVEDILOL 12.5 MG/1
12.5 TABLET ORAL 2 TIMES DAILY WITH MEALS
Status: DISCONTINUED | OUTPATIENT
Start: 2019-09-21 | End: 2019-09-30 | Stop reason: HOSPADM

## 2019-09-21 RX ADMIN — HYDROCODONE BITARTRATE AND ACETAMINOPHEN 1 TABLET: 5; 325 TABLET ORAL at 10:09

## 2019-09-21 RX ADMIN — HEPARIN SODIUM 5000 UNITS: 5000 INJECTION, SOLUTION INTRAVENOUS; SUBCUTANEOUS at 02:09

## 2019-09-21 RX ADMIN — CARVEDILOL 12.5 MG: 12.5 TABLET, FILM COATED ORAL at 05:09

## 2019-09-21 RX ADMIN — POTASSIUM CHLORIDE 40 MEQ: 20 TABLET, EXTENDED RELEASE ORAL at 10:09

## 2019-09-21 RX ADMIN — INSULIN ASPART 2 UNITS: 100 INJECTION, SOLUTION INTRAVENOUS; SUBCUTANEOUS at 12:09

## 2019-09-21 RX ADMIN — ALBUMIN (HUMAN) 12.5 G: 25 SOLUTION INTRAVENOUS at 03:09

## 2019-09-21 RX ADMIN — FUROSEMIDE 60 MG: 10 INJECTION, SOLUTION INTRAMUSCULAR; INTRAVENOUS at 09:09

## 2019-09-21 RX ADMIN — ASPIRIN 325 MG ORAL TABLET 325 MG: 325 PILL ORAL at 09:09

## 2019-09-21 RX ADMIN — NIFEDIPINE 30 MG: 30 TABLET, FILM COATED, EXTENDED RELEASE ORAL at 09:09

## 2019-09-21 RX ADMIN — INSULIN ASPART 2 UNITS: 100 INJECTION, SOLUTION INTRAVENOUS; SUBCUTANEOUS at 05:09

## 2019-09-21 RX ADMIN — POTASSIUM CHLORIDE 40 MEQ: 20 TABLET, EXTENDED RELEASE ORAL at 09:09

## 2019-09-21 RX ADMIN — DOPAMINE HYDROCHLORIDE 2 MCG/KG/MIN: 160 INJECTION, SOLUTION INTRAVENOUS at 05:09

## 2019-09-21 RX ADMIN — FUROSEMIDE 60 MG: 10 INJECTION, SOLUTION INTRAMUSCULAR; INTRAVENOUS at 05:09

## 2019-09-21 RX ADMIN — HEPARIN SODIUM 5000 UNITS: 5000 INJECTION, SOLUTION INTRAVENOUS; SUBCUTANEOUS at 09:09

## 2019-09-21 RX ADMIN — FLUTICASONE FUROATE AND VILANTEROL TRIFENATATE 1 PUFF: 100; 25 POWDER RESPIRATORY (INHALATION) at 08:09

## 2019-09-21 RX ADMIN — FAMOTIDINE 20 MG: 20 TABLET, FILM COATED ORAL at 09:09

## 2019-09-21 RX ADMIN — MAGNESIUM SULFATE HEPTAHYDRATE 3 G: 500 INJECTION, SOLUTION INTRAMUSCULAR; INTRAVENOUS at 11:09

## 2019-09-21 RX ADMIN — INSULIN ASPART 1 UNITS: 100 INJECTION, SOLUTION INTRAVENOUS; SUBCUTANEOUS at 09:09

## 2019-09-21 NOTE — PLAN OF CARE
Problem: Adult Inpatient Plan of Care  Goal: Plan of Care Review  Outcome: Ongoing (interventions implemented as appropriate)  Pt vitals were maintained. Pt Bg was in normal range 88 , gave her some o.j., Pt potassium 2.9 will check lab in the am to see if it has changed. Pt stil shows signs of SOB when ambulating to bathroom. Pt had some vivid dreams where she was in the moving in her sleep, pt stated that she always scream and move while she is dreaming.

## 2019-09-21 NOTE — PROGRESS NOTES
Pharmacist Renal Dose Adjustment Note    Nidia Martel is a 79 y.o. female being treated with the medication Famotidine    Patient Data:    Vital Signs (Most Recent):  Temp: 98 °F (36.7 °C) (09/21/19 0808)  Pulse: 82 (09/21/19 0833)  Resp: 18 (09/21/19 0833)  BP: (!) 156/69 (09/21/19 0808)  SpO2: 97 % (09/21/19 0833)   Vital Signs (72h Range):  Temp:  [97.9 °F (36.6 °C)-98.5 °F (36.9 °C)]   Pulse:  [75-87]   Resp:  [16-20]   BP: (134-157)/(65-90)   SpO2:  [91 %-97 %]      Recent Labs   Lab 09/20/19  1753 09/21/19 0831   CREATININE 1.9* 1.9*     Serum creatinine: 1.9 mg/dL (H) 09/21/19 0831  Estimated creatinine clearance: 24.9 mL/min (A)    Medication:Famotidine dose: 20 mg frequency BID will be changed to medication:Famotidine dose:20 mg  frequency:Daily     Pharmacist's Name: Betsy Magdaleno  Pharmacist's Extension: [phone]

## 2019-09-21 NOTE — PLAN OF CARE
Problem: Adult Inpatient Plan of Care  Goal: Plan of Care Review  Labs, notes, and orders reviewed.

## 2019-09-21 NOTE — PLAN OF CARE
Plan of care discussed with bedside RNMolly. Dopamine administration delayed due to single IV status. Per Molly team aware.  Staff attempting to insert additional lines.

## 2019-09-21 NOTE — CONSULTS
"[facility]    CC: pancreatic adenocarcinoma    HPI 79 y.o. 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 Patient had been complaining of abdominal pain and shortness of breath. She did not have nausea and vomiting. At OSH she had a CT scan of the abd/pelvis with mass in the richard hepatis contiguous with the neck or body of the pancrease concerning for bx. She had an EGD with EUS at [facility] in [place] (report unavailable) with pathology returning as poorly differentiated carcinoma with neuroendocrine features. She was referred for second opinion on management    Reviewed previous hospital records and summarized as above    PMH  CKD  HTN  DM2  CHF  Pancreatic ca    PSH  None    Social History  Denies smoking  Denies ivdu  Denies alcohol use    Family hx  No family hx of gastric ca    Review of Systems  General ROS: negative for chills, fever or weight loss  Psychological ROS: negative for hallucination, depression or suicidal ideation  Ophthalmic ROS: negative for blurry vision, photophobia or eye pain  ENT ROS: negative for epistaxis, sore throat or rhinorrhea  Respiratory ROS: no cough, positive for shortness of breath, no wheezing  Cardiovascular ROS: no chest pain, positive for dyspnea on exertion  Gastrointestinal ROS: positive for abdominal pain, no change in bowel habits, or black/ bloody stools  Genito-Urinary ROS: no dysuria, trouble voiding, or hematuria  Musculoskeletal ROS: negative for gait disturbance or muscular weakness  Neurological ROS: no syncope or seizures; no ataxia  Dermatological ROS: negative for pruritis, rash and jaundice    Physical Examination  BP (!) 176/77 (Patient Position: Lying)   Pulse 86   Temp 98.2 °F (36.8 °C) (Oral)   Resp 18   Ht 5' 5" (1.651 m)   Wt 78.5 kg (173 lb 1 oz)   SpO2 97%   Breastfeeding? No   " BMI 28.80 kg/m²   General appearance: alert, cooperative, no distress, obese  HENT: Normocephalic, atraumatic, neck symmetrical, no nasal discharge   Eyes: conjunctivae/corneas clear, PERRL, EOM's intact  Lungs: no audible wheeze, symmetric chest rise  Heart: regular rate and rhythm; palpable peripheral pulses  Abdomen: soft, non-tender; bowel sounds normoactive; no organomegaly  Extremities: extremities symmetric; no clubbing, cyanosis, or edema  Integument: Skin color, texture, turgor normal; no rashes; hair distrubution normal  Neurologic: Alert and oriented X 3, moves all extremities appropriately   Psychiatric: no pressured speech; normal affect; no evidence of impaired cognition     Labs:  H/H 10.4/32.6  Plt 305    Imaging:  CXR: increased pulmonary vasculature congestion at perihilar area    Reviewed images personally    Assessment:   79 year old woman admitted for further evaluation of poorly diffrentiated pancreatic adenocarcinoma with neuroendocrine features not associated with biliary obstruction    Plan:   - Will discuss with AES on Monday in regards to possible EUS with core bx for better sampling of mass and to determine portal vein status. Keep NPO at midnight on Sunday night.    Please call with any questions or concerns. Discussed with Dr. Skyler Johnson  [facility]  Cell [phone]

## 2019-09-21 NOTE — PROGRESS NOTES
Neuro endocrine surgery service Progress Note    Admit Date: 9/20/2019   LOS: 1 day     SUBJECTIVE:     Interval history/overnight:  The patient was seen examined this morning.  Patient denies any acute overnight events.  Patient was currently on 2 L nasal cannula oxygen this morning.  Patient or statin at home she does not use nasal cannula.  Patient endorses that her shortness breath has improved since oxygen supplementation.  She denies any productive cough.  She denies any chest pain, pleuritic chest pain, bilateral lower extremity edema, or headache.  The patient was afebrile overnight.  Her vitals were stable.  Her BP max of 156/69.  We are currently holding her Arb in the setting of an acute kidney injury.  Her urine output was appropriate.  She did have a bowel movement yesterday.  She denies any significant nausea or vomiting.    Scheduled Meds:   aspirin  325 mg Oral Daily    carvedilol  12.5 mg Oral BID WM    famotidine  20 mg Oral BID    fluticasone furoate-vilanterol  1 puff Inhalation Daily    furosemide  60 mg Intravenous BID    heparin (porcine)  5,000 Units Subcutaneous Q8H    magnesium sulfate IVPB  3 g Intravenous Once    NIFEdipine  30 mg Oral Daily    potassium chloride  40 mEq Intravenous 1 time in Clinic/HOD    potassium chloride  40 mEq Oral Q1H     Continuous Infusions:  PRN Meds:acetaminophen, Dextrose 10% Bolus, Dextrose 10% Bolus, dextrose 50 % in water (D50W), dextrose 50 % in water (D50W), glucagon (human recombinant), glucose, glucose, HYDROcodone-acetaminophen, insulin aspart U-100, sodium chloride 0.9%    Review of patient's allergies indicates:   Allergen Reactions    Epinephrine      Neuroendocrine Tumor patient         OBJECTIVE:     Vital Signs (Most Recent)  Temp: 98 °F (36.7 °C) (09/21/19 0808)  Pulse: 82 (09/21/19 0833)  Resp: 18 (09/21/19 0833)  BP: (!) 156/69 (09/21/19 0808)  SpO2: 97 % (09/21/19 0833)    Vital Signs Range (Last 24H):  Temp:  [97.9 °F (36.6  °C)-98.5 °F (36.9 °C)]   Pulse:  [75-87]   Resp:  [16-20]   BP: (134-157)/(65-90)   SpO2:  [91 %-97 %]     I & O (Last 24H):    Intake/Output Summary (Last 24 hours) at 9/21/2019 0956  Last data filed at 9/21/2019 0500  Gross per 24 hour   Intake 650 ml   Output 1300 ml   Net -650 ml     Physical Exam:  General: AAOx3. Patient appears distress  HEENT: NCAT. PERRLA. EOMI grossly intact. Vision grossly intact. Hearing grossly intact.   Neck: Supple. No JVD. No LAD. No thyromegaly or masses.   CV: RRR. NL S1/S2. No M/R/G.   Chest: NL effort.  Bilateral rales noted at the bases.  No obvious respiratory distress  Abd:  soft, tender to palpation in the bilateral lower abdomen.  No peritoneal signs. No rebound or guarding.  Ext: No C/C/E. Peripheral pulses intact. Minimal edema bilaterally present.  Skin: Intact. No rash. No lesions. Overall color, texture & turgor wnl   Neuro: CN II-XII grossly intact. No evidence of focal neurological deficit. Strength 5/5 throughout. SILT grossly intact.    Psych: Good judgement and reason. No A/V hallucinations. No abnormal behaviors.       ASSESSMENT/PLAN:     The patient has a poorly differentiated tumor with neuroendocrine features possibly an adenocarcinoma of the pancreas with high-grade neuroendocrine features.  She has a pertinent past medical history of congestive heart failure which currently appears to be decompensated in terms of her current symptoms of shortness of breath and increased minimal lower extremity edema.     The patient may need a contrast enhanced CT scan versus MRCP  FDG PET  A Cardiology consult was placed for medical comanagement.  Will follow up on echo.  Her BNP was 199.  CBC this morning with white blood cell count 8.6  Hemoglobin is 10.4 stable.  Potassium 2.9.  Magnesium 1.5.  Replace both electrolyte abnormalities.  Creatinine 1.9.  Unknown creatinine baseline.  HPI may be secondary to acute exacerbation of known heart failure.  Increase Lasix dose.   Continue to monitor.  Holding Arb in the setting of an acute kidney injury.  Albumin 2.9.  Continue to encourage nutritional supplementation  GI consult placed and will follow up on recommendations  Continue to obtain daily weights.  Fluid restriction to 1500 mL per day.  Continue carbohydrate consistent diet.  Continue to encourage out of bed to chair.  Continue to encourage ambulation with assistance.  Continue to encourage incentive spirometry and chest physiotherapy   Continue supportive care, pain management, close clinical monitoring    Dennys Lockett Jr., D.O.  [facility]

## 2019-09-21 NOTE — PLAN OF CARE
Problem: Adult Inpatient Plan of Care  Goal: Plan of Care Review  Outcome: Ongoing (interventions implemented as appropriate)  Patient on oxygen with documented flow.  Will attempt to wean per O2 order protocol. The proper method of use, as well as anticipated side effects, of this metered-dose inhaler are discussed and demonstrated to the patient. Will continue to monitor.

## 2019-09-21 NOTE — CONSULTS
Cardiology    SUBJECTIVE:     History of Present Illness:  Patient is a 79 y.o. female presents with hx of CHF (unknown type, saw a cardiologist years ago in [place], [place]; not sure had an angiogram in the past), CKD4 (sees a nephrologist), HTN, HLD, IDDM2 with now pancreatic neuroendocrine tumor who was admitted for optimization medically and a biopsy. From a cardiac perspective, the patient notes some dyspnea for several weeks that has been stable. She is able to do things around her house but doesn't do too much activity given fatigue and dyspnea. Notes shes been more and more tired. Intermittently leg swelling. No prthopnea, pnd, syncope, chest pain, palpitations. Pt notes taking her medications regularly. Cards consulted for CHF. Pt was laying in bed comfortably in NAD. Her exam is without any edema, rhonchi, but a SEJ and some jvd. Her BNP was mildly elevated at 199. EKG with an SR w/ iRBBB with low voltage and and motion artifact. Cr was 1.9. Hgb 10.4, K 2.9.  I performed a bedside echo on her noting a EF 70%, no MR or AI. Normal AV and MV. Could not assess right side, diastology or filling pressures. No pericardial effusion was appreciated.  CXR noted some mild pulmonary edema.       Review of patient's allergies indicates:   Allergen Reactions    Epinephrine      Neuroendocrine Tumor patient         Past Medical History:   Diagnosis Date    Arthritis     CKD (chronic kidney disease)     DM (diabetes mellitus)     GERD (gastroesophageal reflux disease)     HTN (hypertension)     Hyperlipidemia     Primary malignant neuroendocrine neoplasm of pancreas      History reviewed. No pertinent surgical history.  History reviewed. No pertinent family history.  Social History     Tobacco Use    Smoking status: Never Smoker    Smokeless tobacco: Never Used   Substance Use Topics    Alcohol use: Never     Frequency: Never    Drug use: Never        Home meds:  No current facility-administered  medications on file prior to encounter.      Current Outpatient Medications on File Prior to Encounter   Medication Sig Dispense Refill    amitriptyline (ELAVIL) 25 MG tablet Take 25 mg by mouth nightly as needed for Insomnia.      aspirin 325 MG tablet Take 325 mg by mouth once daily.       budesonide-formoterol 160-4.5 mcg (SYMBICORT) 160-4.5 mcg/actuation HFAA Inhale 2 puffs into the lungs every 12 (twelve) hours. Controller      calcitRIOL (ROCALTROL) 0.5 MCG Cap Take 0.5 mcg by mouth once daily.      carvedilol (COREG) 6.25 MG tablet Take 6.25 mg by mouth 2 (two) times daily with meals.      ergocalciferol (ERGOCALCIFEROL) 50,000 unit Cap Take 50,000 Units by mouth Daily.       furosemide (LASIX) 20 MG tablet Take 20 mg by mouth 2 (two) times daily.      hydrALAZINE (APRESOLINE) 10 MG tablet Take 10 mg by mouth 3 (three) times daily.      HYDROcodone-acetaminophen (NORCO) 5-325 mg per tablet Take 1 tablet by mouth every 6 (six) hours as needed for Pain.      linaGLIPtin (TRADJENTA) 5 mg Tab tablet Take 5 mg by mouth once daily.      losartan (COZAAR) 100 MG tablet Take 100 mg by mouth once daily.      NIFEdipine (ADALAT CC) 30 MG TbSR Take 30 mg by mouth once daily.      dicyclomine (BENTYL) 20 mg tablet Take 20 mg by mouth every 6 (six) hours.      insulin (LANTUS SOLOSTAR U-100 INSULIN) glargine 100 units/mL (3mL) SubQ pen Inject 20 Units into the skin.      ranitidine (ZANTAC) 150 MG capsule Take 150 mg by mouth 2 (two) times daily as needed.          Current meds:  Scheduled Meds:   aspirin  325 mg Oral Daily    carvedilol  12.5 mg Oral BID WM    famotidine  20 mg Oral BID    fluticasone furoate-vilanterol  1 puff Inhalation Daily    furosemide  60 mg Intravenous BID    heparin (porcine)  5,000 Units Subcutaneous Q8H    NIFEdipine  30 mg Oral Daily    potassium chloride  40 mEq Oral Q1H     Continuous Infusions:  PRN Meds:.acetaminophen, Dextrose 10% Bolus, Dextrose 10% Bolus,  dextrose 50 % in water (D50W), dextrose 50 % in water (D50W), glucagon (human recombinant), glucose, glucose, HYDROcodone-acetaminophen, insulin aspart U-100, sodium chloride 0.9%      OBJECTIVE:     Vital Signs (Most Recent)  Temp: 98 °F (36.7 °C) (09/21/19 0808)  Pulse: 82 (09/21/19 0833)  Resp: 18 (09/21/19 0833)  BP: (!) 156/69 (09/21/19 0808)  SpO2: 97 % (09/21/19 0833)    Vital Signs Range (Last 24H):  Temp:  [97.9 °F (36.6 °C)-98.5 °F (36.9 °C)]   Pulse:  [75-87]   Resp:  [16-20]   BP: (134-157)/(65-90)   SpO2:  [91 %-97 %]     Physical Exam:  GEN: awake, alert, following all commands , NAD  HEENT: Dry oral mucosa, mildly elevated jvd  Neck: mildly elevated jvd, no masses, supple  Heart: rrr, 3/6 sejm at LLSB  Lungs: CTA BL, no wrr  Abdomen: soft, nd, nt  Ext: no edema, +pulses  Neuro: no focal neuro deficits  MSK: moving all ext spont  Skin: no ulcers or lesions    Laboratory:  LABS  CBC  Recent Labs   Lab 09/20/19 1753 09/21/19  0831   WBC 8.18 8.67   RBC 3.99* 3.89*   HGB 10.9* 10.4*   HCT 34.0* 32.6*    305   MCV 85 84   MCH 27.3 26.7*   MCHC 32.1 31.9*     BMP  Recent Labs   Lab 09/20/19 1753 09/21/19  0831    141   K 2.9* 2.9*   CO2 22* 25    103   BUN 43* 41*   CREATININE 1.9* 1.9*   * 136*       Recent Labs   Lab 09/20/19 1753 09/21/19  0831   CALCIUM 8.7 8.8   MG  --  1.5*   PHOS  --  3.2       LFT  Recent Labs   Lab 09/20/19 1753 09/21/19  0831   PROT 8.1 8.0   ALBUMIN 2.8* 2.9*   BILITOT 0.4 0.5   AST 30 28   ALKPHOS 225* 217*   ALT 12 12       COAGS  No results for input(s): PT, INR, APTT in the last 168 hours.  CE  No results for input(s): TROPONINI, CKTOTAL, CKMB in the last 168 hours.  BNP  Recent Labs   Lab 09/21/19  0522   *     ASSESSMENT/PLAN:     # Dyspnea 2/2 suspected pulmonary edema / chf exacerbation  # Preop evaluation for biospy  # Hx of CHF unknown type, suspected diastolic  # HypoK  # HTN/HLD    - Pt is moderate risk for a cardiac event for a  moderate risk procedure (intrabdominal biopsy) per the LVCI score (6.6% complication risk)  - She is overall stable and appears minimally overloaded by jvd and cxr.   - Her EF is robust. RV function wnl. No effusions.  - Do not think she needs any further cardiac work up as this may delay her work up and treatment and ultimately may not alter her mgmt.   - Agree with official echo  - Replete K to get back to 4; aim to keep K @ 4, mag>2, phos >3  - Stop hydralazine for now (have room to increase other bp meds)  - Increase coreg to 12.5mg BID for HR and BP control.   - Can give lasix 60mg IV x1 today for dyspnea and reevaluate  - Surgery at the discretion of the .     Glenn Beck MD  [facility] Cardiology   Cell: [phone]

## 2019-09-21 NOTE — PROGRESS NOTES
Rx message- Patient has peripheral IV line. Potassium Chloride IV order for 40 meq dose was changed to Appropriate concentration of 10 meq/100 ml X 4  for total dose of 40 mEq ([employer] approved pharmacy protocol)

## 2019-09-21 NOTE — PROGRESS NOTES
Progress notes reviewed. Rounds completed. Introduced self as VN for this shift. Educated pt on VN's role in patient's care.  Plan of care reviewed with patient. Opportunity given for pt's questions. No questions or concerns expressed at this time. Safety precautions explained, instructed to call for assistance. Patient verbalizes understanding.  VN to continue to monitor.         09/21/19 2268   Type of Frequent Check   Type Patient Rounds  (VN round)   Safety/Activity   Patient Rounds ID band on;visualized patient   Positioning   Body Position positioned/repositioned independently   Pain/Comfort/Sleep   Preferred Pain Scale number (Numeric Rating Pain Scale)   Pain Rating (0-10): Rest 0   IV Site Check   IV Site(s) intact without redness   Headache   Headache No   Assessments (Pre/Post)   Level of Consciousness (AVPU) alert

## 2019-09-21 NOTE — NURSING
Dr. Acosta notified of yesterday's potassium of 2.9 with no supplements ordered and no BMP ordered for this am; new order received.

## 2019-09-22 PROBLEM — E46 MALNUTRITION COMPROMISING BODILY FUNCTION: Status: ACTIVE | Noted: 2019-09-22

## 2019-09-22 LAB
ALBUMIN SERPL BCP-MCNC: 3 G/DL (ref 3.5–5.2)
ALP SERPL-CCNC: 196 U/L (ref 55–135)
ALT SERPL W/O P-5'-P-CCNC: 12 U/L (ref 10–44)
ANION GAP SERPL CALC-SCNC: 11 MMOL/L (ref 8–16)
AORTIC ROOT ANNULUS: 2.33 CM
AORTIC VALVE CUSP SEPERATION: 1.7 CM
AST SERPL-CCNC: 24 U/L (ref 10–40)
AV INDEX (PROSTH): 0.86
AV MEAN GRADIENT: 5 MMHG
AV PEAK GRADIENT: 9 MMHG
AV VALVE AREA: 2.67 CM2
AV VELOCITY RATIO: 0.96
BASOPHILS # BLD AUTO: 0.01 K/UL (ref 0–0.2)
BASOPHILS NFR BLD: 0.1 % (ref 0–1.9)
BILIRUB SERPL-MCNC: 0.5 MG/DL (ref 0.1–1)
BSA FOR ECHO PROCEDURE: 1.89 M2
BUN SERPL-MCNC: 40 MG/DL (ref 8–23)
CALCIUM SERPL-MCNC: 9 MG/DL (ref 8.7–10.5)
CHLORIDE SERPL-SCNC: 104 MMOL/L (ref 95–110)
CO2 SERPL-SCNC: 25 MMOL/L (ref 23–29)
CREAT SERPL-MCNC: 2 MG/DL (ref 0.5–1.4)
CV ECHO LV RWT: 0.58 CM
DIFFERENTIAL METHOD: ABNORMAL
DOP CALC AO PEAK VEL: 1.53 M/S
DOP CALC AO VTI: 31.02 CM
DOP CALC LVOT AREA: 3.1 CM2
DOP CALC LVOT DIAMETER: 1.99 CM
DOP CALC LVOT PEAK VEL: 1.47 M/S
DOP CALC LVOT STROKE VOLUME: 82.97 CM3
DOP CALCLVOT PEAK VEL VTI: 26.69 CM
E WAVE DECELERATION TIME: 292.35 MSEC
E/A RATIO: 0.73
ECHO LV POSTERIOR WALL: 1.21 CM (ref 0.6–1.1)
EOSINOPHIL # BLD AUTO: 0.1 K/UL (ref 0–0.5)
EOSINOPHIL NFR BLD: 1.3 % (ref 0–8)
ERYTHROCYTE [DISTWIDTH] IN BLOOD BY AUTOMATED COUNT: 15.7 % (ref 11.5–14.5)
EST. GFR  (AFRICAN AMERICAN): 27 ML/MIN/1.73 M^2
EST. GFR  (NON AFRICAN AMERICAN): 23 ML/MIN/1.73 M^2
FRACTIONAL SHORTENING: 33 % (ref 28–44)
GLUCOSE SERPL-MCNC: 207 MG/DL (ref 70–110)
HCT VFR BLD AUTO: 30.4 % (ref 37–48.5)
HGB BLD-MCNC: 9.8 G/DL (ref 12–16)
INTERVENTRICULAR SEPTUM: 1.11 CM (ref 0.6–1.1)
IVRT: 0.06 MSEC
LA MAJOR: 4.58 CM
LA MINOR: 4.2 CM
LA WIDTH: 3.07 CM
LEFT ATRIUM SIZE: 4.03 CM
LEFT ATRIUM VOLUME INDEX: 24.8 ML/M2
LEFT ATRIUM VOLUME: 46.08 CM3
LEFT INTERNAL DIMENSION IN SYSTOLE: 2.78 CM (ref 2.1–4)
LEFT VENTRICLE DIASTOLIC VOLUME INDEX: 41.21 ML/M2
LEFT VENTRICLE DIASTOLIC VOLUME: 76.65 ML
LEFT VENTRICLE MASS INDEX: 90 G/M2
LEFT VENTRICLE SYSTOLIC VOLUME INDEX: 15.6 ML/M2
LEFT VENTRICLE SYSTOLIC VOLUME: 29.08 ML
LEFT VENTRICULAR INTERNAL DIMENSION IN DIASTOLE: 4.16 CM (ref 3.5–6)
LEFT VENTRICULAR MASS: 167.09 G
LYMPHOCYTES # BLD AUTO: 0.7 K/UL (ref 1–4.8)
LYMPHOCYTES NFR BLD: 7.9 % (ref 18–48)
MAGNESIUM SERPL-MCNC: 2.1 MG/DL (ref 1.6–2.6)
MCH RBC QN AUTO: 26.8 PG (ref 27–31)
MCHC RBC AUTO-ENTMCNC: 32.2 G/DL (ref 32–36)
MCV RBC AUTO: 83 FL (ref 82–98)
MONOCYTES # BLD AUTO: 1 K/UL (ref 0.3–1)
MONOCYTES NFR BLD: 11.8 % (ref 4–15)
MV PEAK A VEL: 1.52 M/S
MV PEAK E VEL: 1.11 M/S
NEUTROPHILS # BLD AUTO: 6.6 K/UL (ref 1.8–7.7)
NEUTROPHILS NFR BLD: 78.9 % (ref 38–73)
PHOSPHATE SERPL-MCNC: 2.5 MG/DL (ref 2.7–4.5)
PLATELET # BLD AUTO: 286 K/UL (ref 150–350)
PMV BLD AUTO: 10 FL (ref 9.2–12.9)
POCT GLUCOSE: 139 MG/DL (ref 70–110)
POCT GLUCOSE: 158 MG/DL (ref 70–110)
POCT GLUCOSE: 178 MG/DL (ref 70–110)
POCT GLUCOSE: 190 MG/DL (ref 70–110)
POTASSIUM SERPL-SCNC: 3.6 MMOL/L (ref 3.5–5.1)
PROT SERPL-MCNC: 8 G/DL (ref 6–8.4)
PULM VEIN S/D RATIO: 1.6
PV PEAK D VEL: 0.4 M/S
PV PEAK S VEL: 0.64 M/S
PV PEAK VELOCITY: 1.34 CM/S
RA MAJOR: 4.31 CM
RBC # BLD AUTO: 3.65 M/UL (ref 4–5.4)
SODIUM SERPL-SCNC: 140 MMOL/L (ref 136–145)
WBC # BLD AUTO: 8.44 K/UL (ref 3.9–12.7)

## 2019-09-22 PROCEDURE — 94640 AIRWAY INHALATION TREATMENT: CPT

## 2019-09-22 PROCEDURE — 83735 ASSAY OF MAGNESIUM: CPT

## 2019-09-22 PROCEDURE — 99900035 HC TECH TIME PER 15 MIN (STAT)

## 2019-09-22 PROCEDURE — 25000003 PHARM REV CODE 250: Performed by: STUDENT IN AN ORGANIZED HEALTH CARE EDUCATION/TRAINING PROGRAM

## 2019-09-22 PROCEDURE — 84100 ASSAY OF PHOSPHORUS: CPT

## 2019-09-22 PROCEDURE — 36415 COLL VENOUS BLD VENIPUNCTURE: CPT

## 2019-09-22 PROCEDURE — 85025 COMPLETE CBC W/AUTO DIFF WBC: CPT

## 2019-09-22 PROCEDURE — 80053 COMPREHEN METABOLIC PANEL: CPT

## 2019-09-22 PROCEDURE — 94664 DEMO&/EVAL PT USE INHALER: CPT

## 2019-09-22 PROCEDURE — 63600175 PHARM REV CODE 636 W HCPCS: Performed by: STUDENT IN AN ORGANIZED HEALTH CARE EDUCATION/TRAINING PROGRAM

## 2019-09-22 PROCEDURE — A4216 STERILE WATER/SALINE, 10 ML: HCPCS | Performed by: SURGERY

## 2019-09-22 PROCEDURE — 94799 UNLISTED PULMONARY SVC/PX: CPT

## 2019-09-22 PROCEDURE — 27000221 HC OXYGEN, UP TO 24 HOURS

## 2019-09-22 PROCEDURE — 94761 N-INVAS EAR/PLS OXIMETRY MLT: CPT

## 2019-09-22 PROCEDURE — P9047 ALBUMIN (HUMAN), 25%, 50ML: HCPCS | Mod: JG | Performed by: STUDENT IN AN ORGANIZED HEALTH CARE EDUCATION/TRAINING PROGRAM

## 2019-09-22 PROCEDURE — 25000003 PHARM REV CODE 250: Performed by: SURGERY

## 2019-09-22 PROCEDURE — 27000646 HC AEROBIKA DEVICE

## 2019-09-22 PROCEDURE — 21400001 HC TELEMETRY ROOM

## 2019-09-22 RX ORDER — POTASSIUM CHLORIDE 7.45 MG/ML
30 INJECTION INTRAVENOUS ONCE
Status: COMPLETED | OUTPATIENT
Start: 2019-09-22 | End: 2019-09-22

## 2019-09-22 RX ORDER — ASPIRIN 81 MG/1
81 TABLET ORAL DAILY
Status: DISCONTINUED | OUTPATIENT
Start: 2019-09-23 | End: 2019-09-30 | Stop reason: HOSPADM

## 2019-09-22 RX ORDER — ALBUMIN HUMAN 250 G/1000ML
12.5 SOLUTION INTRAVENOUS ONCE
Status: COMPLETED | OUTPATIENT
Start: 2019-09-22 | End: 2019-09-22

## 2019-09-22 RX ORDER — FUROSEMIDE 10 MG/ML
20 INJECTION INTRAMUSCULAR; INTRAVENOUS ONCE
Status: COMPLETED | OUTPATIENT
Start: 2019-09-22 | End: 2019-09-22

## 2019-09-22 RX ORDER — SODIUM CHLORIDE 0.9 % (FLUSH) 0.9 %
10 SYRINGE (ML) INJECTION
Status: DISCONTINUED | OUTPATIENT
Start: 2019-09-22 | End: 2019-09-23

## 2019-09-22 RX ORDER — ONDANSETRON 2 MG/ML
4 INJECTION INTRAMUSCULAR; INTRAVENOUS EVERY 6 HOURS PRN
Status: DISCONTINUED | OUTPATIENT
Start: 2019-09-22 | End: 2019-09-30 | Stop reason: HOSPADM

## 2019-09-22 RX ORDER — SODIUM CHLORIDE 0.9 % (FLUSH) 0.9 %
10 SYRINGE (ML) INJECTION EVERY 6 HOURS
Status: DISCONTINUED | OUTPATIENT
Start: 2019-09-22 | End: 2019-09-23

## 2019-09-22 RX ADMIN — FLUTICASONE FUROATE AND VILANTEROL TRIFENATATE 1 PUFF: 100; 25 POWDER RESPIRATORY (INHALATION) at 08:09

## 2019-09-22 RX ADMIN — SPIRONOLACTONE 50 MG: 25 TABLET, FILM COATED ORAL at 08:09

## 2019-09-22 RX ADMIN — FUROSEMIDE 20 MG: 10 INJECTION, SOLUTION INTRAMUSCULAR; INTRAVENOUS at 03:09

## 2019-09-22 RX ADMIN — ALBUMIN (HUMAN) 12.5 G: 25 SOLUTION INTRAVENOUS at 01:09

## 2019-09-22 RX ADMIN — POTASSIUM CHLORIDE 30 MEQ: 7.46 INJECTION, SOLUTION INTRAVENOUS at 08:09

## 2019-09-22 RX ADMIN — CARVEDILOL 12.5 MG: 12.5 TABLET, FILM COATED ORAL at 04:09

## 2019-09-22 RX ADMIN — ASCORBIC ACID, VITAMIN A PALMITATE, CHOLECALCIFEROL, THIAMINE HYDROCHLORIDE, RIBOFLAVIN-5 PHOSPHATE SODIUM, PYRIDOXINE HYDROCHLORIDE, NIACINAMIDE, DEXPANTHENOL, ALPHA-TOCOPHEROL ACETATE, VITAMIN K1, FOLIC ACID, BIOTIN, CYANOCOBALAMIN: 200; 3300; 200; 6; 3.6; 6; 40; 15; 10; 150; 600; 60; 5 INJECTION, SOLUTION INTRAVENOUS at 04:09

## 2019-09-22 RX ADMIN — INSULIN ASPART 1 UNITS: 100 INJECTION, SOLUTION INTRAVENOUS; SUBCUTANEOUS at 10:09

## 2019-09-22 RX ADMIN — ASPIRIN 325 MG ORAL TABLET 325 MG: 325 PILL ORAL at 08:09

## 2019-09-22 RX ADMIN — NIFEDIPINE 30 MG: 30 TABLET, FILM COATED, EXTENDED RELEASE ORAL at 08:09

## 2019-09-22 RX ADMIN — HEPARIN SODIUM 5000 UNITS: 5000 INJECTION, SOLUTION INTRAVENOUS; SUBCUTANEOUS at 03:09

## 2019-09-22 RX ADMIN — HYDROCODONE BITARTRATE AND ACETAMINOPHEN 1 TABLET: 5; 325 TABLET ORAL at 08:09

## 2019-09-22 RX ADMIN — HEPARIN SODIUM 5000 UNITS: 5000 INJECTION, SOLUTION INTRAVENOUS; SUBCUTANEOUS at 10:09

## 2019-09-22 RX ADMIN — INSULIN ASPART 2 UNITS: 100 INJECTION, SOLUTION INTRAVENOUS; SUBCUTANEOUS at 08:09

## 2019-09-22 RX ADMIN — Medication 10 ML: at 10:09

## 2019-09-22 RX ADMIN — HEPARIN SODIUM 5000 UNITS: 5000 INJECTION, SOLUTION INTRAVENOUS; SUBCUTANEOUS at 05:09

## 2019-09-22 RX ADMIN — INSULIN ASPART 2 UNITS: 100 INJECTION, SOLUTION INTRAVENOUS; SUBCUTANEOUS at 04:09

## 2019-09-22 RX ADMIN — INSULIN ASPART 2 UNITS: 100 INJECTION, SOLUTION INTRAVENOUS; SUBCUTANEOUS at 05:09

## 2019-09-22 RX ADMIN — ACETAMINOPHEN 650 MG: 325 TABLET ORAL at 04:09

## 2019-09-22 RX ADMIN — CARVEDILOL 12.5 MG: 12.5 TABLET, FILM COATED ORAL at 08:09

## 2019-09-22 RX ADMIN — FUROSEMIDE 40 MG: 10 INJECTION, SOLUTION INTRAVENOUS at 08:09

## 2019-09-22 RX ADMIN — ACETAMINOPHEN 650 MG: 325 TABLET ORAL at 10:09

## 2019-09-22 NOTE — PLAN OF CARE
Problem: Adult Inpatient Plan of Care  Goal: Plan of Care Review  Outcome: Ongoing (interventions implemented as appropriate)  Cued into patient's room.  Patient not responding to introduction and permission inquiry.  Patient resting comfortably in bed with eyes closed; respirations even and unlabored.  No distress noted.    Labs, notes, and orders reviewed.

## 2019-09-22 NOTE — PROCEDURES
"Nidia Martel is a 79 y.o. female patient.    Temp: 97.8 °F (36.6 °C) (09/22/19 1115)  Pulse: 71 (09/22/19 1200)  Resp: 16 (09/22/19 1115)  BP: (!) 130/59 (09/22/19 1115)  SpO2: (!) 93 % (09/22/19 0841)  Weight: 80.5 kg (177 lb 7.5 oz) (09/22/19 0351)  Height: 5' 5" (165.1 cm) (09/20/19 1217)    PICC  Date/Time: 9/22/2019 2:23 PM  Performed by: Dima Fernández Jr. RN  Consent Done: Yes  Indications: med administration  Anesthesia: local infiltration  Local anesthetic: lidocaine 1% without epinephrine  Anesthetic Total (mL): 2  Preparation: skin prepped with ChloraPrep  Skin prep agent dried: skin prep agent completely dried prior to procedure  Sterile barriers: all five maximum sterile barriers used - cap, mask, sterile gown, sterile gloves, and large sterile sheet  Hand hygiene: hand hygiene performed prior to central venous catheter insertion  Location details: right basilic  Catheter type: double lumen  Catheter size: 5 Fr  Catheter Length: 38cm    Ultrasound guidance: yes  Vessel Caliber: small, compressibility normal  Vascular Doppler: not done  Needle advanced into vessel with real time Ultrasound guidance.  Guidewire confirmed in vessel.  Sterile sheath used.  Number of attempts: 1  Post-procedure: blood return through all ports, chlorhexidine patch and sterile dressing applied  Technical procedures used: modified omar Fernández Jr  9/22/2019  "

## 2019-09-22 NOTE — PROGRESS NOTES
Cardiology Progress Note    Admit Date: 9/20/2019   LOS: 2 days     Subjective     Pt notes having abd pain. Her breathing is a little better. Cr 1.9>2. Was put on dopamine for renal support by surgery. UOP -1L. Stable otherwise.      Objective     Vital Signs (Most Recent)  Temp: 98.1 °F (36.7 °C) (09/22/19 0720)  Pulse: 83 (09/22/19 0841)  Resp: 18 (09/22/19 0841)  BP: (!) 145/64 (09/22/19 0720)  SpO2: (!) 93 % (09/22/19 0841)    I & O (Last 24H):    Intake/Output Summary (Last 24 hours) at 9/22/2019 0936  Last data filed at 9/22/2019 0830  Gross per 24 hour   Intake 864.6 ml   Output 1950 ml   Net -1085.4 ml       Physical Exam:  GEN: Awake, alert, fllowing all commands,  HEENT: Dry oral mucosa, distended neck veins  Heart: RRR, 2/6 SEJ @ LLSB  Lungs: Bibasilar course breath sounds, otherwise CTA BL  Abdomen: Soft, distended, bs+  Ext: no edema, dry skin    Laboratory:  Recent Labs   Lab 09/20/19 1753 09/21/19  0831 09/22/19  0444   WBC 8.18 8.67 8.44   RBC 3.99* 3.89* 3.65*   HGB 10.9* 10.4* 9.8*   HCT 34.0* 32.6* 30.4*    305 286   MCV 85 84 83   MCH 27.3 26.7* 26.8*   MCHC 32.1 31.9* 32.2     Recent Labs   Lab 09/20/19 1753 09/21/19  0831 09/22/19  0444    141 140   K 2.9* 2.9* 3.6    103 104   CO2 22* 25 25   BUN 43* 41* 40*   CREATININE 1.9* 1.9* 2.0*   * 136* 207*     Recent Labs   Lab 09/20/19 1753 09/21/19  0831 09/22/19  0444   CALCIUM 8.7 8.8 9.0   MG  --  1.5* 2.1   PHOS  --  3.2 2.5*     Recent Labs   Lab 09/20/19  1753 09/21/19  0831 09/22/19  0444   PROT 8.1 8.0 8.0   ALBUMIN 2.8* 2.9* 3.0*   BILITOT 0.4 0.5 0.5   AST 30 28 24   ALKPHOS 225* 217* 196*   ALT 12 12 12     No results for input(s): PT, INR, APTT in the last 168 hours.  No results for input(s): TROPONINI, CKTOTAL, CKMB in the last 168 hours.  Recent Labs   Lab 09/21/19  0522   *       Scheduled Meds:   aspirin  325 mg Oral Daily    carvedilol  12.5 mg Oral BID WM    fluticasone furoate-vilanterol  1  puff Inhalation Daily    furosemide  40 mg Intravenous Daily    heparin (porcine)  5,000 Units Subcutaneous Q8H    NIFEdipine  30 mg Oral Daily    spironolactone  50 mg Oral Daily     Continuous Infusions:   DOPamine 2 mcg/kg/min (09/21/19 9104)     PRN Meds:acetaminophen, Dextrose 10% Bolus, Dextrose 10% Bolus, dextrose 50 % in water (D50W), dextrose 50 % in water (D50W), glucagon (human recombinant), glucose, glucose, HYDROcodone-acetaminophen, insulin aspart U-100, ondansetron, sodium chloride 0.9%    Assessment/Plan        # Dyspnea 2/2 pulmonary edema / Acute on chronic D-chf exacerbation (improved)  # Preop evaluation for biospy  # G1-D-CHF  # HypoK (resolved)  # HTN/HLD (improved)    - Doing well. Cont diuresis x1 more day and then stop.  - Cont current antihypertensives.  - ?if her renal function is at baseline.  - Official echo report pending.  - Would avoid dopamine.  - Replete K w/ KDur 40meq x1 today  - Consider renal consult.   - As mentioned before, do not think she needs any further cardiac work up as this may delay her work up and treatment and ultimately may not alter her mgmt.   - Pt is moderate risk for a cardiac event for a moderate risk procedure (intrabdominal biopsy) per the LVCI score (6.6% complication risk). Surgery at the discretion of the .  - She should fu with her cardiologist upon discharge or establish here with one.  - No further recs. Will be available as needed. Please call with any further questions or concerns.     Glenn Beck  [facility]   Cell: [phone]

## 2019-09-22 NOTE — PLAN OF CARE
Pt AAOx4. Medications administered per order. Dopamine drip infusing. Vitals checked q15 for first hour, q30 second hour, q1h for the next 4 hours. 2L NC maintained. Pt complained of nausea, VN notified. PRN zofran ordered, did not have to administer. Relief maintained upon sitting up in bed for 15-20 minutes. Full relief maintained through the night. Cardiac monitoring maintained. Pt sleeping in between care. Encouraged to call with questions/concerns. Will continue to monitor. Safety maintained.

## 2019-09-22 NOTE — PROGRESS NOTES
Progress notes reviewed. Morning rounds completed. Introduced self as VN for this shift. Educated pt on VN's role in patient's care.  Plan of care reviewed with patient. Opportunity given for pt's questions. No questions or concerns expressed at this time. Safety precautions explained, instructed to call for assistance. Patient verbalizes understanding.  VN to continue to monitor.   Picc line placed, pending verification.        09/22/19 1400   Type of Frequent Check   Type   (VN round)   Positioning   Body Position positioned/repositioned independently   Assessments (Pre/Post)   Level of Consciousness (AVPU) alert

## 2019-09-22 NOTE — PROGRESS NOTES
[facility] Neuro[facility]ocrine [facility]/General Surgery  Progress Note    Admit Date: 9/20/2019  Hospital Day: 2  Procedure:   TBD    Subjective:  NAEO. AFVSS. Tm 98.8.  Pt slept well overnight.   No pain, no nausea. No chest pain or SOB this AM.  On renal dopa. On spironolactone.  Lasix given for diuresis yesterday. Pt reports good UOP yesterday.  She has less appetite today, but no nausea. Tolerating diet.    Per GI- will discuss plan for biopsy with advanced endoscopy team, NPO MN tonight  Per cards- increase coreg to 12.5 BID, official ECHO. Pt is at moderate risk for moderate risk procedure    Physical Exam:  Gen: no acute distress. Alert and oriented x3.  HEENT: normocephalic and atraumatic. EOMI.   Resp: unlabored respirations  CV: regular rate, distal pulses intact  Abd: soft, non-tender, non-distended  Ext: warm and well perfused, mild swelling  MSK: strength grossly intact  Neuro: no focal deficits, normal sensation    Labs reviewed  I/O (last 24 hours): -935  UOP: 1750    Assessment/Plan: 79 y.o. 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  She has a pertinent past medical history of congestive heart failure which currently appears to be decompensated in terms of her current symptoms of shortness of breath and increased minimal lower extremity edema.   Pt will need contrast enhanced CT versus MRCP when able to give contrast, given kidney function, and FDG PET.    -NPO at midnight for possible procedure with GI tomorrow  -continue diabetic diet today as tolerated  -SSI for DM  -increase coreg to 12.5 BID per Cards  -stop hydralazine  -replace electrolytes  -pain control, anti-emetics as needed  -SCDs for ppx    Diet-  diabetic, NPO at MN  Antibiotics- none  Prophylaxis- SCDs    Jannie Acosta MD   [facility] General Surgery, PGY2  [phone]  09/22/2019     Addendum-  -PICC line consult for  placement today, for electrolyte replacement, renal dopamine infusion  -will start clinimix at 40    Inpatient Data     Vitals:   Temp:  [97.9 °F (36.6 °C)-98.8 °F (37.1 °C)] 98.1 °F (36.7 °C)  Pulse:  [67-89] 81  Resp:  [17-20] 18  SpO2:  [91 %-97 %] 93 %  BP: (131-176)/(60-77) 145/64 Intake/Output:  09/21 0701 - 09/22 0700  In: 814.6 [P.O.:485; I.V.:326.7]  Out: 1750 [Urine:1750]       Recent Labs     09/20/19  1753 09/21/19  0831 09/21/19  1238 09/22/19  0444   WBC 8.18 8.67  --  8.44   HGB 10.9* 10.4*  --  9.8*   HCT 34.0* 32.6*  --  30.4*    305  --  286    141  --  140   K 2.9* 2.9*  --  3.6    103  --  104   CO2 22* 25  --  25   BUN 43* 41*  --  40*   CREATININE 1.9* 1.9*  --  2.0*   BILITOT 0.4 0.5  --  0.5   AST 30 28  --  24   ALT 12 12  --  12   ALKPHOS 225* 217*  --  196*   CALCIUM 8.7 8.8  --  9.0   ALBUMIN 2.8* 2.9*  --  3.0*   PROT 8.1 8.0  --  8.0   MG  --  1.5*  --  2.1   PHOS  --  3.2  --  2.5*   PREALBUMIN  --   --  14*  --         Scheduled Meds:   aspirin  325 mg Oral Daily    carvedilol  12.5 mg Oral BID WM    fluticasone furoate-vilanterol  1 puff Inhalation Daily    furosemide  40 mg Intravenous Daily    heparin (porcine)  5,000 Units Subcutaneous Q8H    NIFEdipine  30 mg Oral Daily    spironolactone  50 mg Oral Daily     Continuous Infusions:   DOPamine 2 mcg/kg/min (09/21/19 1734)     PRN Meds:acetaminophen, Dextrose 10% Bolus, Dextrose 10% Bolus, dextrose 50 % in water (D50W), dextrose 50 % in water (D50W), glucagon (human recombinant), glucose, glucose, HYDROcodone-acetaminophen, insulin aspart U-100, ondansetron, sodium chloride 0.9%

## 2019-09-23 ENCOUNTER — ANESTHESIA (OUTPATIENT)
Dept: ENDOSCOPY | Facility: HOSPITAL | Age: 79
DRG: 829 | End: 2019-09-23
Payer: MEDICARE

## 2019-09-23 ENCOUNTER — ANESTHESIA EVENT (OUTPATIENT)
Dept: ENDOSCOPY | Facility: HOSPITAL | Age: 79
DRG: 829 | End: 2019-09-23
Payer: MEDICARE

## 2019-09-23 LAB
ALBUMIN SERPL BCP-MCNC: 3 G/DL (ref 3.5–5.2)
ALP SERPL-CCNC: 187 U/L (ref 55–135)
ALT SERPL W/O P-5'-P-CCNC: 11 U/L (ref 10–44)
ANION GAP SERPL CALC-SCNC: 12 MMOL/L (ref 8–16)
AST SERPL-CCNC: 22 U/L (ref 10–40)
BACTERIA UR CULT: NORMAL
BACTERIA UR CULT: NORMAL
BASOPHILS # BLD AUTO: 0.01 K/UL (ref 0–0.2)
BASOPHILS NFR BLD: 0.1 % (ref 0–1.9)
BILIRUB SERPL-MCNC: 0.5 MG/DL (ref 0.1–1)
BUN SERPL-MCNC: 42 MG/DL (ref 8–23)
CALCIUM SERPL-MCNC: 9.4 MG/DL (ref 8.7–10.5)
CHLORIDE SERPL-SCNC: 104 MMOL/L (ref 95–110)
CO2 SERPL-SCNC: 22 MMOL/L (ref 23–29)
CREAT SERPL-MCNC: 1.9 MG/DL (ref 0.5–1.4)
DIFFERENTIAL METHOD: ABNORMAL
EOSINOPHIL # BLD AUTO: 0.1 K/UL (ref 0–0.5)
EOSINOPHIL NFR BLD: 1.1 % (ref 0–8)
ERYTHROCYTE [DISTWIDTH] IN BLOOD BY AUTOMATED COUNT: 15.9 % (ref 11.5–14.5)
EST. GFR  (AFRICAN AMERICAN): 28 ML/MIN/1.73 M^2
EST. GFR  (NON AFRICAN AMERICAN): 25 ML/MIN/1.73 M^2
GLUCOSE SERPL-MCNC: 219 MG/DL (ref 70–110)
HCT VFR BLD AUTO: 29.9 % (ref 37–48.5)
HGB BLD-MCNC: 9.6 G/DL (ref 12–16)
LYMPHOCYTES # BLD AUTO: 1 K/UL (ref 1–4.8)
LYMPHOCYTES NFR BLD: 11.4 % (ref 18–48)
MAGNESIUM SERPL-MCNC: 2 MG/DL (ref 1.6–2.6)
MCH RBC QN AUTO: 26.7 PG (ref 27–31)
MCHC RBC AUTO-ENTMCNC: 32.1 G/DL (ref 32–36)
MCV RBC AUTO: 83 FL (ref 82–98)
MONOCYTES # BLD AUTO: 0.7 K/UL (ref 0.3–1)
MONOCYTES NFR BLD: 7.4 % (ref 4–15)
NEUTROPHILS # BLD AUTO: 7.1 K/UL (ref 1.8–7.7)
NEUTROPHILS NFR BLD: 80 % (ref 38–73)
PHOSPHATE SERPL-MCNC: 2.5 MG/DL (ref 2.7–4.5)
PLATELET # BLD AUTO: 247 K/UL (ref 150–350)
PMV BLD AUTO: 10.4 FL (ref 9.2–12.9)
POCT GLUCOSE: 159 MG/DL (ref 70–110)
POCT GLUCOSE: 215 MG/DL (ref 70–110)
POCT GLUCOSE: 216 MG/DL (ref 70–110)
POCT GLUCOSE: 239 MG/DL (ref 70–110)
POTASSIUM SERPL-SCNC: 3.6 MMOL/L (ref 3.5–5.1)
PROT SERPL-MCNC: 8.3 G/DL (ref 6–8.4)
RBC # BLD AUTO: 3.59 M/UL (ref 4–5.4)
SODIUM SERPL-SCNC: 138 MMOL/L (ref 136–145)
WBC # BLD AUTO: 8.92 K/UL (ref 3.9–12.7)

## 2019-09-23 PROCEDURE — 37000009 HC ANESTHESIA EA ADD 15 MINS: Performed by: INTERNAL MEDICINE

## 2019-09-23 PROCEDURE — 27000221 HC OXYGEN, UP TO 24 HOURS

## 2019-09-23 PROCEDURE — 43242 EGD US FINE NEEDLE BX/ASPIR: CPT | Performed by: INTERNAL MEDICINE

## 2019-09-23 PROCEDURE — 63600175 PHARM REV CODE 636 W HCPCS: Performed by: STUDENT IN AN ORGANIZED HEALTH CARE EDUCATION/TRAINING PROGRAM

## 2019-09-23 PROCEDURE — 99900035 HC TECH TIME PER 15 MIN (STAT)

## 2019-09-23 PROCEDURE — 37000008 HC ANESTHESIA 1ST 15 MINUTES: Performed by: INTERNAL MEDICINE

## 2019-09-23 PROCEDURE — 88342 IMHCHEM/IMCYTCHM 1ST ANTB: CPT | Performed by: PATHOLOGY

## 2019-09-23 PROCEDURE — 94799 UNLISTED PULMONARY SVC/PX: CPT

## 2019-09-23 PROCEDURE — 88360 TUMOR IMMUNOHISTOCHEM/MANUAL: CPT | Mod: 26,,, | Performed by: PATHOLOGY

## 2019-09-23 PROCEDURE — 25000003 PHARM REV CODE 250: Performed by: SURGERY

## 2019-09-23 PROCEDURE — 25000003 PHARM REV CODE 250: Performed by: NURSE ANESTHETIST, CERTIFIED REGISTERED

## 2019-09-23 PROCEDURE — 94761 N-INVAS EAR/PLS OXIMETRY MLT: CPT

## 2019-09-23 PROCEDURE — 88342 IMHCHEM/IMCYTCHM 1ST ANTB: CPT | Mod: 26,59,, | Performed by: PATHOLOGY

## 2019-09-23 PROCEDURE — 36415 COLL VENOUS BLD VENIPUNCTURE: CPT

## 2019-09-23 PROCEDURE — A4216 STERILE WATER/SALINE, 10 ML: HCPCS | Performed by: SURGERY

## 2019-09-23 PROCEDURE — 43242 EGD US FINE NEEDLE BX/ASPIR: CPT | Mod: ,,, | Performed by: INTERNAL MEDICINE

## 2019-09-23 PROCEDURE — 43242 PR UPGI ENDOSCOPY,FN NEEDLE BX,GUIDED: ICD-10-PCS | Mod: ,,, | Performed by: INTERNAL MEDICINE

## 2019-09-23 PROCEDURE — A4216 STERILE WATER/SALINE, 10 ML: HCPCS | Performed by: STUDENT IN AN ORGANIZED HEALTH CARE EDUCATION/TRAINING PROGRAM

## 2019-09-23 PROCEDURE — 27202131 HC NEEDLE, FNB SINGLE (ANY): Performed by: INTERNAL MEDICINE

## 2019-09-23 PROCEDURE — 25000003 PHARM REV CODE 250: Performed by: STUDENT IN AN ORGANIZED HEALTH CARE EDUCATION/TRAINING PROGRAM

## 2019-09-23 PROCEDURE — 63600175 PHARM REV CODE 636 W HCPCS: Performed by: INTERNAL MEDICINE

## 2019-09-23 PROCEDURE — 80053 COMPREHEN METABOLIC PANEL: CPT

## 2019-09-23 PROCEDURE — 94664 DEMO&/EVAL PT USE INHALER: CPT

## 2019-09-23 PROCEDURE — 88305 TISSUE EXAM BY PATHOLOGIST: CPT | Mod: 26,,, | Performed by: PATHOLOGY

## 2019-09-23 PROCEDURE — 63600175 PHARM REV CODE 636 W HCPCS: Performed by: SURGERY

## 2019-09-23 PROCEDURE — 21400001 HC TELEMETRY ROOM

## 2019-09-23 PROCEDURE — 85025 COMPLETE CBC W/AUTO DIFF WBC: CPT

## 2019-09-23 PROCEDURE — 94640 AIRWAY INHALATION TREATMENT: CPT

## 2019-09-23 PROCEDURE — 88360 CYTOLOGY SPECIMEN-FNA NON-RADIOLOGY CLINICIAN PERFORMED W/O ON SITE: ICD-10-PCS | Mod: 26,,, | Performed by: PATHOLOGY

## 2019-09-23 PROCEDURE — 84100 ASSAY OF PHOSPHORUS: CPT

## 2019-09-23 PROCEDURE — 88342 CYTOLOGY SPECIMEN-FNA NON-RADIOLOGY CLINICIAN PERFORMED W/O ON SITE: ICD-10-PCS | Mod: 26,59,, | Performed by: PATHOLOGY

## 2019-09-23 PROCEDURE — 63600175 PHARM REV CODE 636 W HCPCS: Performed by: NURSE ANESTHETIST, CERTIFIED REGISTERED

## 2019-09-23 PROCEDURE — B4185 PARENTERAL SOL 10 GM LIPIDS: HCPCS | Performed by: SURGERY

## 2019-09-23 PROCEDURE — 88305 CYTOLOGY SPECIMEN-FNA NON-RADIOLOGY CLINICIAN PERFORMED W/O ON SITE: ICD-10-PCS | Mod: 26,,, | Performed by: PATHOLOGY

## 2019-09-23 PROCEDURE — 88305 TISSUE EXAM BY PATHOLOGIST: CPT | Performed by: PATHOLOGY

## 2019-09-23 PROCEDURE — 83735 ASSAY OF MAGNESIUM: CPT

## 2019-09-23 RX ORDER — SODIUM CHLORIDE 0.9 % (FLUSH) 0.9 %
10 SYRINGE (ML) INJECTION EVERY 6 HOURS
Status: DISCONTINUED | OUTPATIENT
Start: 2019-09-23 | End: 2019-09-30 | Stop reason: HOSPADM

## 2019-09-23 RX ORDER — ONDANSETRON 2 MG/ML
8 INJECTION INTRAMUSCULAR; INTRAVENOUS ONCE
Status: COMPLETED | OUTPATIENT
Start: 2019-09-23 | End: 2019-09-23

## 2019-09-23 RX ORDER — SODIUM CHLORIDE 9 MG/ML
INJECTION, SOLUTION INTRAVENOUS CONTINUOUS PRN
Status: DISCONTINUED | OUTPATIENT
Start: 2019-09-23 | End: 2019-09-23

## 2019-09-23 RX ORDER — OCTREOTIDE ACETATE 500 UG/ML
500 INJECTION, SOLUTION INTRAVENOUS; SUBCUTANEOUS
Status: DISCONTINUED | OUTPATIENT
Start: 2019-09-23 | End: 2019-09-23

## 2019-09-23 RX ORDER — PROPOFOL 10 MG/ML
VIAL (ML) INTRAVENOUS CONTINUOUS PRN
Status: DISCONTINUED | OUTPATIENT
Start: 2019-09-23 | End: 2019-09-23

## 2019-09-23 RX ORDER — LIDOCAINE HCL/PF 100 MG/5ML
SYRINGE (ML) INTRAVENOUS
Status: DISCONTINUED | OUTPATIENT
Start: 2019-09-23 | End: 2019-09-23

## 2019-09-23 RX ORDER — PROPOFOL 10 MG/ML
VIAL (ML) INTRAVENOUS
Status: DISCONTINUED | OUTPATIENT
Start: 2019-09-23 | End: 2019-09-23

## 2019-09-23 RX ORDER — SODIUM CHLORIDE 0.9 % (FLUSH) 0.9 %
10 SYRINGE (ML) INJECTION
Status: DISCONTINUED | OUTPATIENT
Start: 2019-09-23 | End: 2019-09-30 | Stop reason: HOSPADM

## 2019-09-23 RX ADMIN — ASCORBIC ACID, VITAMIN A PALMITATE, CHOLECALCIFEROL, THIAMINE HYDROCHLORIDE, RIBOFLAVIN-5 PHOSPHATE SODIUM, PYRIDOXINE HYDROCHLORIDE, NIACINAMIDE, DEXPANTHENOL, ALPHA-TOCOPHEROL ACETATE, VITAMIN K1, FOLIC ACID, BIOTIN, CYANOCOBALAMIN: 200; 3300; 200; 6; 3.6; 6; 40; 15; 10; 150; 600; 60; 5 INJECTION, SOLUTION INTRAVENOUS at 05:09

## 2019-09-23 RX ADMIN — HEPARIN SODIUM 5000 UNITS: 5000 INJECTION, SOLUTION INTRAVENOUS; SUBCUTANEOUS at 05:09

## 2019-09-23 RX ADMIN — OCTREOTIDE ACETATE: 500 INJECTION, SOLUTION INTRAVENOUS; SUBCUTANEOUS at 10:09

## 2019-09-23 RX ADMIN — CARVEDILOL 12.5 MG: 12.5 TABLET, FILM COATED ORAL at 05:09

## 2019-09-23 RX ADMIN — I.V. FAT EMULSION 250 ML: 20 EMULSION INTRAVENOUS at 09:09

## 2019-09-23 RX ADMIN — HEPARIN SODIUM 5000 UNITS: 5000 INJECTION, SOLUTION INTRAVENOUS; SUBCUTANEOUS at 09:09

## 2019-09-23 RX ADMIN — Medication 10 ML: at 01:09

## 2019-09-23 RX ADMIN — FUROSEMIDE 40 MG: 10 INJECTION, SOLUTION INTRAVENOUS at 01:09

## 2019-09-23 RX ADMIN — Medication 10 ML: at 05:09

## 2019-09-23 RX ADMIN — TOPICAL ANESTHETIC 1 EACH: 200 SPRAY DENTAL; PERIODONTAL at 11:09

## 2019-09-23 RX ADMIN — SODIUM CHLORIDE: 0.9 INJECTION, SOLUTION INTRAVENOUS at 11:09

## 2019-09-23 RX ADMIN — ONDANSETRON 8 MG: 2 INJECTION INTRAMUSCULAR; INTRAVENOUS at 10:09

## 2019-09-23 RX ADMIN — PROPOFOL 50 MG: 10 INJECTION, EMULSION INTRAVENOUS at 11:09

## 2019-09-23 RX ADMIN — FLUTICASONE FUROATE AND VILANTEROL TRIFENATATE 1 PUFF: 100; 25 POWDER RESPIRATORY (INHALATION) at 07:09

## 2019-09-23 RX ADMIN — SPIRONOLACTONE 50 MG: 25 TABLET, FILM COATED ORAL at 01:09

## 2019-09-23 RX ADMIN — DOPAMINE HYDROCHLORIDE 2 MCG/KG/MIN: 160 INJECTION, SOLUTION INTRAVENOUS at 05:09

## 2019-09-23 RX ADMIN — ACETAMINOPHEN 650 MG: 325 TABLET ORAL at 03:09

## 2019-09-23 RX ADMIN — INSULIN ASPART 4 UNITS: 100 INJECTION, SOLUTION INTRAVENOUS; SUBCUTANEOUS at 05:09

## 2019-09-23 RX ADMIN — ASPIRIN 81 MG: 81 TABLET, COATED ORAL at 01:09

## 2019-09-23 RX ADMIN — NIFEDIPINE 30 MG: 30 TABLET, FILM COATED, EXTENDED RELEASE ORAL at 01:09

## 2019-09-23 RX ADMIN — LIDOCAINE HYDROCHLORIDE 100 MG: 20 INJECTION, SOLUTION INTRAVENOUS at 11:09

## 2019-09-23 RX ADMIN — PROPOFOL 110 MCG/KG/MIN: 10 INJECTION, EMULSION INTRAVENOUS at 11:09

## 2019-09-23 RX ADMIN — HEPARIN SODIUM 5000 UNITS: 5000 INJECTION, SOLUTION INTRAVENOUS; SUBCUTANEOUS at 03:09

## 2019-09-23 RX ADMIN — CARVEDILOL 12.5 MG: 12.5 TABLET, FILM COATED ORAL at 01:09

## 2019-09-23 RX ADMIN — INSULIN ASPART 4 UNITS: 100 INJECTION, SOLUTION INTRAVENOUS; SUBCUTANEOUS at 08:09

## 2019-09-23 NOTE — PROGRESS NOTES
[facility] Neuroendocrine Surgery/General Surgery  Progress Note    Admit Date: 9/20/2019  Hospital Day: 3  Procedure:   TBD    Subjective:  NAEO. AFVSS. Tm 98.7.  Pt slept well overnight.   No pain, no nausea. No chest pain or SOB this AM.  On renal dopa. On spironolactone.  Lasix given for diuresis yesterday.  Good UOP yesterday.  She has less appetite today, but no nausea. Tolerating diet.  NPO from midnight for possible EUS with biopsy with GI today.     Per GI- will discuss plan for biopsy with advanced endoscopy team, NPO MN  Per cards- increase coreg to 12.5 BID, official ECHO. Pt is at moderate risk for moderate risk procedure    Physical Exam:  Gen: no acute distress. Alert and oriented x3.  HEENT: normocephalic and atraumatic. EOMI.   Resp: unlabored respirations  CV: regular rate, distal pulses intact  Abd: soft, non-tender, non-distended  Ext: warm and well perfused, mild swelling  MSK: strength grossly intact  Neuro: no focal deficits, normal sensation    Labs reviewed  H/H 9.6/29  WBC 8.9  K 3.6     I/O (last 24 hours): -900  UOP: 900    Assessment/Plan: 79 y.o. 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  She has a pertinent past medical history of congestive heart failure which currently appears to be decompensated in terms of her current symptoms of shortness of breath and increased minimal lower extremity edema.   Pt will need contrast enhanced CT versus MRCP when able to give contrast, given kidney function, and FDG PET.    -NPO at midnight for possible procedure with GI today  -SSI for DM  -increased coreg to 12.5 BID per Cards, stopped hydralazine  -replace electrolytes  -pain control, anti-emetics as needed  -consult PICC team for line  -SCDs for ppx    Diet-  diabetic, NPO at MN  Antibiotics- none  Prophylaxis- SCDs    Jannie Acosta MD   [facility] General Surgery,  PGY2  [phone]  09/23/2019     Addendum-  -PICC line consult for placement today, for electrolyte replacement, renal dopamine infusion  -will start clinimix at 40    Inpatient Data     Vitals:   Temp:  [97.8 °F (36.6 °C)-98.7 °F (37.1 °C)] 98.7 °F (37.1 °C)  Pulse:  [67-86] 84  Resp:  [16-22] 17  SpO2:  [92 %-96 %] 93 %  BP: (128-166)/(59-72) 154/72 Intake/Output:  09/22 0701 - 09/23 0700  In: 728 [P.O.:625; I.V.:103]  Out: 900 [Urine:900]       Recent Labs     09/20/19  1753 09/21/19  0831 09/21/19  1238 09/22/19  0444   WBC 8.18 8.67  --  8.44   HGB 10.9* 10.4*  --  9.8*   HCT 34.0* 32.6*  --  30.4*    305  --  286    141  --  140   K 2.9* 2.9*  --  3.6    103  --  104   CO2 22* 25  --  25   BUN 43* 41*  --  40*   CREATININE 1.9* 1.9*  --  2.0*   BILITOT 0.4 0.5  --  0.5   AST 30 28  --  24   ALT 12 12  --  12   ALKPHOS 225* 217*  --  196*   CALCIUM 8.7 8.8  --  9.0   ALBUMIN 2.8* 2.9*  --  3.0*   PROT 8.1 8.0  --  8.0   MG  --  1.5*  --  2.1   PHOS  --  3.2  --  2.5*   PREALBUMIN  --   --  14*  --         Scheduled Meds:   aspirin  81 mg Oral Daily    carvedilol  12.5 mg Oral BID WM    fluticasone furoate-vilanterol  1 puff Inhalation Daily    furosemide  40 mg Intravenous Daily    heparin (porcine)  5,000 Units Subcutaneous Q8H    NIFEdipine  30 mg Oral Daily    sodium chloride 0.9%  10 mL Intravenous Q6H    spironolactone  50 mg Oral Daily     Continuous Infusions:   Amino acid 4.25% - dextrose 10% (CLINIMIX-E) solution with additives (1L provides 42.5 gm AA, 100 gm CHO (340 kcal/L dextrose), Na 35, K 30, Mg 5, Ca 4.5, Acetate 70, Cl 39, Phos 15) 40 mL/hr at 09/22/19 1636    DOPamine 2 mcg/kg/min (09/22/19 1554)     PRN Meds:acetaminophen, Dextrose 10% Bolus, Dextrose 10% Bolus, dextrose 50 % in water (D50W), dextrose 50 % in water (D50W), glucagon (human recombinant), glucose, glucose, HYDROcodone-acetaminophen, insulin aspart U-100, ondansetron, sodium chloride 0.9%, Flushing  PICC Protocol **AND** sodium chloride 0.9% **AND** sodium chloride 0.9%

## 2019-09-23 NOTE — PLAN OF CARE
Pt AAOx4. Family at the bedside. Medications administered per order. Dopamine drip infusing, TPN infusing.  Requested Tylenol for headache, administered. Relief maintained. NPO at midnight, pt verbalized understanding. Ambulated to restroom with assistance. Cardiac monitor maintained, SCDs maintained. Encouraged to call with questions/concerns. Will continue to monitor. Safety maintained.

## 2019-09-23 NOTE — ANESTHESIA PREPROCEDURE EVALUATION
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 The estimated ejection fraction is 60%  · Concentric left ventricular remodeling.  · No wall motion abnormalities.  · Normal right ventricular systolic function.  · Grade I (mild) left ventricular diastolic dysfunction consistent with impaired relaxation.       Anesthesia Evaluation    I have reviewed the Patient Summary Reports.    I have reviewed the Nursing Notes.   I have reviewed the Medications.     Review of Systems  Cardiovascular:   Hypertension CHF    Pulmonary:  Pulmonary Normal    Renal/:   Chronic Renal Disease, CRI    Hepatic/GI:   GERD    Neurological:  Neurology Normal    Endocrine:   Diabetes, type 2    Psych:  Psychiatric Normal           Physical Exam  General:  Obesity    Airway/Jaw/Neck:  Airway Findings: Mouth Opening: Normal Mallampati: III  Improves to II with phonation.  TM Distance: Normal, at least 6 cm  Jaw/Neck Findings:  Neck ROM: Normal ROM      Dental:  Dental Findings: Periodontal disease, Mild   Chest/Lungs:  Chest/Lungs Findings: Decreased Breath Sounds Bilateral     Heart/Vascular:  Heart Findings: Rate: Normal  Rhythm: Regular Rhythm           Lab Results   Component Value Date    WBC 8.92 09/23/2019    HGB 9.6 (L) 09/23/2019    HCT 29.9 (L) 09/23/2019     09/23/2019    ALT 11 09/23/2019    AST 22 09/23/2019     09/23/2019    K 3.6 09/23/2019     09/23/2019 "    CREATININE 1.9 (H) 09/23/2019    BUN 42 (H) 09/23/2019    CO2 22 (L) 09/23/2019    HGBA1C 6.2 (H) 09/21/2019 9/21/19  · Normal left ventricular systolic function. The estimated ejection fraction is 60%  · Concentric left ventricular remodeling.  · No wall motion abnormalities.  · Normal right ventricular systolic function.  · Grade I (mild) left ventricular diastolic dysfunction consistent with impaired relaxation.      Anesthesia Plan  Type of Anesthesia, risks & benefits discussed:  Anesthesia Type:  MAC, general  Patient's Preference:   Intra-op Monitoring Plan:   Intra-op Monitoring Plan Comments:   Post Op Pain Control Plan:   Post Op Pain Control Plan Comments:   Induction:    Beta Blocker:  Patient is not currently on a Beta-Blocker (No further documentation required).       Informed Consent: Patient understands risks and agrees with Anesthesia plan.  Questions answered. Anesthesia consent signed with patient.  ASA Score: 3     Day of Surgery Review of History & Physical:            Ready For Surgery From Anesthesia Perspective.     Lab Results   Component Value Date    WBC 8.92 09/23/2019    HGB 9.6 (L) 09/23/2019    HCT 29.9 (L) 09/23/2019     09/23/2019    ALT 11 09/23/2019    AST 22 09/23/2019     09/23/2019    K 3.6 09/23/2019     09/23/2019    CREATININE 1.9 (H) 09/23/2019    BUN 42 (H) 09/23/2019    CO2 22 (L) 09/23/2019    HGBA1C 6.2 (H) 09/21/2019

## 2019-09-23 NOTE — [employer]OVATION PATIENT INSTRUCTIONS
Discharge Summary/Instructions after an Endoscopic Procedure  Patient Name: Nidia Martel  Patient MRN: [id number]  Patient YOB: 1940 Monday, September 23, 2019  Michel Smart MD  RESTRICTIONS:  During your procedure today, you received medications for sedation.  These   medications may affect your judgment, balance and coordination.  Therefore,   for 24 hours, you have the following restrictions:   - DO NOT drive a car, operate machinery, make legal/financial decisions,   sign important papers or drink alcohol.    ACTIVITY:  Today: no heavy lifting, straining or running due to procedural   sedation/anesthesia.  The following day: return to full activity including work.  DIET:  Eat and drink normally unless instructed otherwise.     TREATMENT FOR COMMON SIDE EFFECTS:  - Mild abdominal pain, nausea, belching, bloating or excessive gas:  rest,   eat lightly and use a heating pad.  - Sore Throat: treat with throat lozenges and/or gargle with warm salt   water.  - Because air was used during the procedure, expelling large amounts of air   from your rectum or belching is normal.  - If a bowel prep was taken, you may not have a bowel movement for 1-3 days.    This is normal.  SYMPTOMS TO WATCH FOR AND REPORT TO YOUR PHYSICIAN:  1. Abdominal pain or bloating, other than gas cramps.  2. Chest pain.  3. Back pain.  4. Signs of infection such as: chills or fever occurring within 24 hours   after the procedure.  5. Rectal bleeding, which would show as bright red, maroon, or black stools.   (A tablespoon of blood from the rectum is not serious, especially if   hemorrhoids are present.)  6. Vomiting.  7. Weakness or dizziness.  GO DIRECTLY TO THE NEAREST EMERGENCY ROOM IF YOU HAVE ANY OF THE FOLLOWING:      Difficulty breathing              Chills and/or fever over 101 F   Persistent vomiting and/or vomiting blood   Severe abdominal pain   Severe chest pain   Black, tarry stools   Bleeding- more than one  tablespoon   Any other symptom or condition that you feel may need urgent attention  Your doctor recommends these additional instructions:  If any biopsies were taken, your doctors clinic will contact you in 1 to 2   weeks with any results.  - [facility].   - Await path results.  For questions, problems or results please call your physician - Michel Smart MD at Work:  [phone].  EMERGENCY PHONE NUMBER: [phone],  LAB RESULTS: [phone]  IF A COMPLICATION OR EMERGENCY SITUATION ARISES AND YOU ARE UNABLE TO REACH   YOUR PHYSICIAN - GO DIRECTLY TO THE EMERGENCY ROOM.  Michel Smart MD  9/23/2019 12:25:31 PM  This report has been verified and signed electronically.  [employer]

## 2019-09-23 NOTE — PROCEDURES
"Nidia Martel is a 79 y.o. female patient.    Temp: 97.7 °F (36.5 °C) (09/23/19 0735)  Pulse: 89 (09/23/19 0759)  Resp: (!) 22 (09/23/19 0759)  BP: (!) 154/70 (09/23/19 0735)  SpO2: (!) 92 % (09/23/19 0759)  Weight: 84.4 kg (186 lb 1.1 oz) (09/23/19 0354)  Height: 5' 5" (165.1 cm) (09/20/19 1217)    PICC  Date/Time: 9/23/2019 9:20 AM  Performed by: Jevon Anaya RN  Consent Done: Yes  Time out: Immediately prior to procedure a time out was called to verify the correct patient, procedure, equipment, support staff and site/side marked as required  Indications: med administration and vascular access  Anesthesia: local infiltration  Local anesthetic: lidocaine 1% without epinephrine  Anesthetic Total (mL): 1  Preparation: skin prepped with ChloraPrep  Skin prep agent dried: skin prep agent completely dried prior to procedure  Sterile barriers: all five maximum sterile barriers used - cap, mask, sterile gown, sterile gloves, and large sterile sheet  Hand hygiene: hand hygiene performed prior to central venous catheter insertion  Location details: right basilic  Catheter type: double lumen  Catheter size: 5 Fr  Catheter Length: 36cm    Ultrasound guidance: yes  Vessel Caliber: small and patent, compressibility normal  Vascular Doppler: not done  Needle advanced into vessel with real time Ultrasound guidance.  Guidewire confirmed in vessel.  Sterile sheath used.  Number of attempts: 1  Post-procedure: blood return through all ports, chlorhexidine patch and sterile dressing applied  Estimated blood loss (mL): 0            Jevon Anaya  9/23/2019  "

## 2019-09-23 NOTE — PLAN OF CARE
BRIEF GI PROGRESS NOTE    Chart reviewed and discussed with Dr Smart (advanced endoscopy service). Will plan for EUS with core biopsy today.      Essie Olmedo MD, [facility], PGY-4  Cell: [phone]  Pager: [phone]

## 2019-09-23 NOTE — NURSING
Dr. Acosta notified of patient removing her PICC line this morning; new orders received to place new PICC line.    Marimar RAMIREZ, AC, notified of patient needing PICC line; will call out PICC line nurse.

## 2019-09-23 NOTE — PROGRESS NOTES
Results for CURT TURNER (MRN [id number]) as of 9/23/2019 10:15   Ref. Range 9/23/2019 06:26   Sodium Latest Ref Range: 136 - 145 mmol/L 138   Potassium Latest Ref Range: 3.5 - 5.1 mmol/L 3.6   Chloride Latest Ref Range: 95 - 110 mmol/L 104   CO2 Latest Ref Range: 23 - 29 mmol/L 22 (L)   Anion Gap Latest Ref Range: 8 - 16 mmol/L 12   BUN, Bld Latest Ref Range: 8 - 23 mg/dL 42 (H)   Creatinine Latest Ref Range: 0.5 - 1.4 mg/dL 1.9 (H)   eGFR if non African American Latest Ref Range: >60 mL/min/1.73 m^2 25 (A)   eGFR if African American Latest Ref Range: >60 mL/min/1.73 m^2 28 (A)   Glucose Latest Ref Range: 70 - 110 mg/dL 219 (H)   Calcium Latest Ref Range: 8.7 - 10.5 mg/dL 9.4   Phosphorus Latest Ref Range: 2.7 - 4.5 mg/dL 2.5 (L)   Magnesium Latest Ref Range: 1.6 - 2.6 mg/dL 2.0   Alkaline Phosphatase Latest Ref Range: 55 - 135 U/L 187 (H)   PROTEIN TOTAL Latest Ref Range: 6.0 - 8.4 g/dL 8.3   Albumin Latest Ref Range: 3.5 - 5.2 g/dL 3.0 (L)   BILIRUBIN TOTAL Latest Ref Range: 0.1 - 1.0 mg/dL 0.5   AST Latest Ref Range: 10 - 40 U/L 22   ALT Latest Ref Range: 10 - 44 U/L 11   Reordered tpn and fat

## 2019-09-23 NOTE — PLAN OF CARE
Cued into patient's room.  Patient not responding to introduction and permission inquiry.  Daughter at bedside stating patient is currently asleep.  No complaints.  Patient resting comfortably in bed with eyes closed; respirations even and unlabored.  No distress noted.

## 2019-09-23 NOTE — ANESTHESIA POSTPROCEDURE EVALUATION
Anesthesia Post Evaluation    Patient: Nidia Martel    Procedure(s) Performed: Procedure(s) (LRB):  EUS with core biopsy (Left)    Final Anesthesia Type: MAC  Patient location during evaluation: GI PACU  Patient participation: Yes- Able to Participate  Level of consciousness: awake and alert and oriented  Post-procedure vital signs: reviewed and stable  Pain management: adequate  Airway patency: patent  PONV status at discharge: No PONV  Anesthetic complications: no      Cardiovascular status: stable, hemodynamically stable and blood pressure returned to baseline  Respiratory status: unassisted, spontaneous ventilation and room air  Hydration status: euvolemic  Follow-up not needed.          Vitals Value Taken Time   /75 9/23/2019 10:24 AM   Temp 36.8 °C (98.2 °F) 9/23/2019 10:24 AM   Pulse 85 9/23/2019 11:46 AM   Resp 20 9/23/2019 10:24 AM   SpO2 94 % 9/23/2019 10:24 AM         No case tracking events are documented in the log.      Pain/Gregor Score: Pain Rating Prior to Med Admin: 0 (9/23/2019  1:47 AM)  Pain Rating Post Med Admin: 0 (9/23/2019  1:47 AM)

## 2019-09-23 NOTE — PLAN OF CARE
TN met with patient at the bedside. Patient currently lives with her daughter Mima, [phone] in [place]. Patient is alert and oriented and independent at home. Currently patient is on oxygen, but states she's not on oxygen at home but has been in the past. Upon discharge, patient's family provide transportation home and to follow appointments.    TN  updated whiteboard with contact information. Blue discharge folder and discharge brochure given to patient.        09/23/19 6106   Discharge Assessment   Assessment Type Discharge Planning Assessment   Confirmed/corrected address and phone number on facesheet? Yes   Assessment information obtained from? Patient;Medical Record   Expected Length of Stay (days) 5   Communicated expected length of stay with patient/caregiver yes   Prior to hospitilization cognitive status: Alert/Oriented   Prior to hospitalization functional status: Independent   Current cognitive status: Alert/Oriented   Current Functional Status: Needs Assistance   Lives With child(aimee), adult;other (see comments)   Able to Return to Prior Arrangements other (see comments)  (TBD)   Is patient able to care for self after discharge? Unable to determine at this time (comments)   Patient's perception of discharge disposition home or selfcare   Readmission Within the Last 30 Days no previous admission in last 30 days   Patient currently being followed by outpatient case management? No   Patient currently receives any other outside agency services? No   Equipment Currently Used at Home none   Do you have any problems affording any of your prescribed medications? No   Is the patient taking medications as prescribed? yes   Does the patient have transportation home? Yes   Transportation Anticipated family or friend will provide   Does the patient receive services at the [facility]? No   Discharge Plan A Home with family   DME Needed Upon Discharge  other (see comments)  (TBD)   Patient/Family in  Agreement with Plan yes

## 2019-09-23 NOTE — PLAN OF CARE
Progress notes reviewed. Rounds completed. Introduced self as VN for this shift. Educated pt on VN's role in pt care. Educated pt about VTE and fall precautions.  Opportunity given for pt's questions. No questions or concerns expressed at this time. CLABSI education given. PICC nurse at the bedside.     09/23/19 0909   Type of Frequent Check   Type Other (see comments)  (vn rounds)   Safety/Activity   Patient Rounds visualized patient   Safety Promotion/Fall Prevention Fall Risk reviewed with patient/family;instructed to call staff for mobility   Positioning   Body Position supine   Head of Bed (HOB) HOB flat   Pain/Comfort/Sleep   Pain Rating (0-10): Rest 0

## 2019-09-24 LAB
ALBUMIN SERPL BCP-MCNC: 2.8 G/DL (ref 3.5–5.2)
ALLENS TEST: ABNORMAL
ALLENS TEST: NORMAL
ALP SERPL-CCNC: 169 U/L (ref 55–135)
ALT SERPL W/O P-5'-P-CCNC: 9 U/L (ref 10–44)
ANION GAP SERPL CALC-SCNC: 8 MMOL/L (ref 8–16)
AST SERPL-CCNC: 18 U/L (ref 10–40)
BASOPHILS # BLD AUTO: 0.01 K/UL (ref 0–0.2)
BASOPHILS NFR BLD: 0.1 % (ref 0–1.9)
BILIRUB SERPL-MCNC: 0.5 MG/DL (ref 0.1–1)
BNP SERPL-MCNC: 557 PG/ML (ref 0–99)
BUN SERPL-MCNC: 42 MG/DL (ref 8–23)
CALCIUM SERPL-MCNC: 9.4 MG/DL (ref 8.7–10.5)
CHLORIDE SERPL-SCNC: 101 MMOL/L (ref 95–110)
CO2 SERPL-SCNC: 27 MMOL/L (ref 23–29)
CREAT SERPL-MCNC: 2.2 MG/DL (ref 0.5–1.4)
DELSYS: ABNORMAL
DELSYS: NORMAL
DIFFERENTIAL METHOD: ABNORMAL
EOSINOPHIL # BLD AUTO: 0.1 K/UL (ref 0–0.5)
EOSINOPHIL NFR BLD: 1.5 % (ref 0–8)
ERYTHROCYTE [DISTWIDTH] IN BLOOD BY AUTOMATED COUNT: 15.9 % (ref 11.5–14.5)
EST. GFR  (AFRICAN AMERICAN): 24 ML/MIN/1.73 M^2
EST. GFR  (NON AFRICAN AMERICAN): 21 ML/MIN/1.73 M^2
FIO2: 95
FLOW: 15
FLOW: 6
GLUCOSE SERPL-MCNC: 279 MG/DL (ref 70–110)
HCO3 UR-SCNC: 24.1 MMOL/L (ref 24–28)
HCO3 UR-SCNC: 24.5 MMOL/L (ref 24–28)
HCT VFR BLD AUTO: 28.7 % (ref 37–48.5)
HGB BLD-MCNC: 9.2 G/DL (ref 12–16)
LYMPHOCYTES # BLD AUTO: 0.7 K/UL (ref 1–4.8)
LYMPHOCYTES NFR BLD: 7 % (ref 18–48)
MAGNESIUM SERPL-MCNC: 2 MG/DL (ref 1.6–2.6)
MCH RBC QN AUTO: 26.6 PG (ref 27–31)
MCHC RBC AUTO-ENTMCNC: 32.1 G/DL (ref 32–36)
MCV RBC AUTO: 83 FL (ref 82–98)
MODE: ABNORMAL
MODE: NORMAL
MONOCYTES # BLD AUTO: 1.4 K/UL (ref 0.3–1)
MONOCYTES NFR BLD: 14.2 % (ref 4–15)
NEUTROPHILS # BLD AUTO: 7.4 K/UL (ref 1.8–7.7)
NEUTROPHILS NFR BLD: 77.2 % (ref 38–73)
PCO2 BLDA: 41.4 MMHG (ref 35–45)
PCO2 BLDA: 42.4 MMHG (ref 35–45)
PH SMN: 7.36 [PH] (ref 7.35–7.45)
PH SMN: 7.38 [PH] (ref 7.35–7.45)
PHOSPHATE SERPL-MCNC: 3.5 MG/DL (ref 2.7–4.5)
PLATELET # BLD AUTO: 287 K/UL (ref 150–350)
PMV BLD AUTO: 10.6 FL (ref 9.2–12.9)
PO2 BLDA: 59 MMHG (ref 80–100)
PO2 BLDA: 87 MMHG (ref 80–100)
POC BE: -1 MMOL/L
POC BE: -1 MMOL/L
POC SATURATED O2: 90 % (ref 95–100)
POC SATURATED O2: 96 % (ref 95–100)
POC TCO2: 25 MMOL/L (ref 23–27)
POC TCO2: 26 MMOL/L (ref 23–27)
POCT GLUCOSE: 145 MG/DL (ref 70–110)
POCT GLUCOSE: 151 MG/DL (ref 70–110)
POCT GLUCOSE: 244 MG/DL (ref 70–110)
POCT GLUCOSE: 312 MG/DL (ref 70–110)
POTASSIUM SERPL-SCNC: 4 MMOL/L (ref 3.5–5.1)
PROT SERPL-MCNC: 7.9 G/DL (ref 6–8.4)
RBC # BLD AUTO: 3.46 M/UL (ref 4–5.4)
SAMPLE: ABNORMAL
SAMPLE: NORMAL
SITE: ABNORMAL
SITE: NORMAL
SODIUM SERPL-SCNC: 136 MMOL/L (ref 136–145)
WBC # BLD AUTO: 9.59 K/UL (ref 3.9–12.7)

## 2019-09-24 PROCEDURE — 94640 AIRWAY INHALATION TREATMENT: CPT

## 2019-09-24 PROCEDURE — 63600175 PHARM REV CODE 636 W HCPCS: Performed by: STUDENT IN AN ORGANIZED HEALTH CARE EDUCATION/TRAINING PROGRAM

## 2019-09-24 PROCEDURE — S0171 BUMETANIDE 0.5 MG: HCPCS | Performed by: STUDENT IN AN ORGANIZED HEALTH CARE EDUCATION/TRAINING PROGRAM

## 2019-09-24 PROCEDURE — 25000003 PHARM REV CODE 250: Performed by: STUDENT IN AN ORGANIZED HEALTH CARE EDUCATION/TRAINING PROGRAM

## 2019-09-24 PROCEDURE — 85025 COMPLETE CBC W/AUTO DIFF WBC: CPT

## 2019-09-24 PROCEDURE — 83735 ASSAY OF MAGNESIUM: CPT

## 2019-09-24 PROCEDURE — 99900035 HC TECH TIME PER 15 MIN (STAT)

## 2019-09-24 PROCEDURE — 84100 ASSAY OF PHOSPHORUS: CPT

## 2019-09-24 PROCEDURE — 83880 ASSAY OF NATRIURETIC PEPTIDE: CPT

## 2019-09-24 PROCEDURE — 27000221 HC OXYGEN, UP TO 24 HOURS

## 2019-09-24 PROCEDURE — 25000242 PHARM REV CODE 250 ALT 637 W/ HCPCS: Performed by: STUDENT IN AN ORGANIZED HEALTH CARE EDUCATION/TRAINING PROGRAM

## 2019-09-24 PROCEDURE — 36600 WITHDRAWAL OF ARTERIAL BLOOD: CPT

## 2019-09-24 PROCEDURE — A4216 STERILE WATER/SALINE, 10 ML: HCPCS | Performed by: STUDENT IN AN ORGANIZED HEALTH CARE EDUCATION/TRAINING PROGRAM

## 2019-09-24 PROCEDURE — 21400001 HC TELEMETRY ROOM

## 2019-09-24 PROCEDURE — 93010 ELECTROCARDIOGRAM REPORT: CPT | Mod: ,,, | Performed by: INTERNAL MEDICINE

## 2019-09-24 PROCEDURE — 93010 EKG 12-LEAD: ICD-10-PCS | Mod: ,,, | Performed by: INTERNAL MEDICINE

## 2019-09-24 PROCEDURE — 94664 DEMO&/EVAL PT USE INHALER: CPT

## 2019-09-24 PROCEDURE — 93005 ELECTROCARDIOGRAM TRACING: CPT

## 2019-09-24 PROCEDURE — 94799 UNLISTED PULMONARY SVC/PX: CPT

## 2019-09-24 PROCEDURE — 97802 MEDICAL NUTRITION INDIV IN: CPT

## 2019-09-24 PROCEDURE — 36415 COLL VENOUS BLD VENIPUNCTURE: CPT

## 2019-09-24 PROCEDURE — 82803 BLOOD GASES ANY COMBINATION: CPT

## 2019-09-24 PROCEDURE — 80053 COMPREHEN METABOLIC PANEL: CPT

## 2019-09-24 PROCEDURE — 94761 N-INVAS EAR/PLS OXIMETRY MLT: CPT

## 2019-09-24 RX ORDER — FUROSEMIDE 10 MG/ML
80 INJECTION INTRAMUSCULAR; INTRAVENOUS DAILY
Status: DISCONTINUED | OUTPATIENT
Start: 2019-09-24 | End: 2019-09-24

## 2019-09-24 RX ORDER — BUMETANIDE 0.25 MG/ML
1 INJECTION INTRAMUSCULAR; INTRAVENOUS ONCE
Status: COMPLETED | OUTPATIENT
Start: 2019-09-24 | End: 2019-09-24

## 2019-09-24 RX ORDER — FUROSEMIDE 10 MG/ML
80 INJECTION INTRAMUSCULAR; INTRAVENOUS DAILY
Status: DISCONTINUED | OUTPATIENT
Start: 2019-09-25 | End: 2019-09-24

## 2019-09-24 RX ORDER — IPRATROPIUM BROMIDE AND ALBUTEROL SULFATE 2.5; .5 MG/3ML; MG/3ML
3 SOLUTION RESPIRATORY (INHALATION) ONCE
Status: COMPLETED | OUTPATIENT
Start: 2019-09-24 | End: 2019-09-24

## 2019-09-24 RX ORDER — FUROSEMIDE 10 MG/ML
40 INJECTION INTRAMUSCULAR; INTRAVENOUS ONCE
Status: DISCONTINUED | OUTPATIENT
Start: 2019-09-24 | End: 2019-09-24

## 2019-09-24 RX ORDER — FUROSEMIDE 10 MG/ML
80 INJECTION INTRAMUSCULAR; INTRAVENOUS 2 TIMES DAILY
Status: DISCONTINUED | OUTPATIENT
Start: 2019-09-24 | End: 2019-09-25

## 2019-09-24 RX ADMIN — Medication 10 ML: at 05:09

## 2019-09-24 RX ADMIN — ASPIRIN 81 MG: 81 TABLET, COATED ORAL at 08:09

## 2019-09-24 RX ADMIN — CARVEDILOL 12.5 MG: 12.5 TABLET, FILM COATED ORAL at 06:09

## 2019-09-24 RX ADMIN — CARVEDILOL 12.5 MG: 12.5 TABLET, FILM COATED ORAL at 08:09

## 2019-09-24 RX ADMIN — FLUTICASONE FUROATE AND VILANTEROL TRIFENATATE 1 PUFF: 100; 25 POWDER RESPIRATORY (INHALATION) at 07:09

## 2019-09-24 RX ADMIN — Medication 10 ML: at 06:09

## 2019-09-24 RX ADMIN — HEPARIN SODIUM 5000 UNITS: 5000 INJECTION, SOLUTION INTRAVENOUS; SUBCUTANEOUS at 04:09

## 2019-09-24 RX ADMIN — HEPARIN SODIUM 5000 UNITS: 5000 INJECTION, SOLUTION INTRAVENOUS; SUBCUTANEOUS at 09:09

## 2019-09-24 RX ADMIN — HEPARIN SODIUM 5000 UNITS: 5000 INJECTION, SOLUTION INTRAVENOUS; SUBCUTANEOUS at 05:09

## 2019-09-24 RX ADMIN — ACETAMINOPHEN 650 MG: 325 TABLET ORAL at 08:09

## 2019-09-24 RX ADMIN — BUMETANIDE 1 MG: 0.25 INJECTION INTRAMUSCULAR; INTRAVENOUS at 05:09

## 2019-09-24 RX ADMIN — FUROSEMIDE 80 MG: 10 INJECTION, SOLUTION INTRAVENOUS at 06:09

## 2019-09-24 RX ADMIN — FUROSEMIDE 40 MG: 10 INJECTION, SOLUTION INTRAVENOUS at 08:09

## 2019-09-24 RX ADMIN — IPRATROPIUM BROMIDE AND ALBUTEROL SULFATE 3 ML: .5; 3 SOLUTION RESPIRATORY (INHALATION) at 01:09

## 2019-09-24 RX ADMIN — SPIRONOLACTONE 50 MG: 25 TABLET, FILM COATED ORAL at 08:09

## 2019-09-24 RX ADMIN — Medication 10 ML: at 12:09

## 2019-09-24 RX ADMIN — ACETAMINOPHEN 650 MG: 325 TABLET ORAL at 02:09

## 2019-09-24 RX ADMIN — NIFEDIPINE 30 MG: 30 TABLET, FILM COATED, EXTENDED RELEASE ORAL at 08:09

## 2019-09-24 RX ADMIN — INSULIN ASPART 8 UNITS: 100 INJECTION, SOLUTION INTRAVENOUS; SUBCUTANEOUS at 12:09

## 2019-09-24 NOTE — PLAN OF CARE
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 We were consulted for KIKI vs CKD 4 though suspect CKD. Pt has been short of breath with evidence of pulm edema, elevated BNP. She has been getting lasix 40mg qd with small amount of diuresis. Given concern for volume overload would give lasix 80mg bid. Will attempt to get records to see if this is her true baseline as per records she has seen nephro in past. Please ensure strict I/O's. Hold aldactone for now until we known this isn't KIKI    Fernando Jorgensen MD  [facility]  IV

## 2019-09-24 NOTE — PROGRESS NOTES
[facility]  Progress Note    Admit Date: 9/20/2019  Hospital Day: 4  Procedure: 1 Day Post-Op s/p EUS with biopsy    Subjective:  NAEO. AFVSS.   SBP 130s to 170s.  Pt slept well overnight.   No pain, no nausea. No chest pain. Having SOB this AM.   Sitting up in the chair, breathing comfortably on O2 NC. Breathing easier sitting up.  On renal dopa. On spironolactone.  Lasix given for diuresis yesterday.  UOP 1000 mL.  No nausea or emesis. Reports minimal appetite.     Per GI- EUS yesterday. Many malignant appearing lymph nodes visualized in celiac region, peripancreatic region, richard hepatis. Hypoechoic. Fine needle biopsy performed. Core needle biopsy performed.  -one stent visualized endosonographically in CBD  -cystic lesions in the pancreatic body and tail    Per cards- increased coreg to 12.5 BID, official ECHO. Pt is at moderate risk for moderate risk procedure    Physical Exam:  Gen: no acute distress. Alert and oriented x3.  HEENT: normocephalic and atraumatic. EOMI. MMM.  Resp: unlabored respirations, on O2 NC  CV: regular rate, distal pulses intact  Abd: soft, obese, no tenderness or distention  Ext: warm and well perfused, mild swelling  MSK: strength grossly intact  Neuro: no focal deficits, normal sensation    Labs reviewed  H/H stable  WBC 9.5  K 4  BUN/Cr 42/2.2    I/O (last 24 hours): +84 mL  UOP: 1000    Assessment/Plan: 79 y.o. 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  She has a pertinent past medical history of congestive heart failure which currently appears to be decompensated in terms of her current symptoms of shortness of breath and increased minimal lower extremity edema.   Pt will need contrast enhanced CT versus MRCP when able to give contrast, given kidney function, and FDG PET.    -s/p EUS   -SSI for DM  -on TPN  -increased  coreg to 12.5 BID per Cards, stopped hydralazine  -replace electrolytes IV via PICC  -pain control, anti-emetics as needed  -Renal consulted for evaluation and recommendation for diuresis and optimization for possible contrast study  -SCDs for ppx    Diet-  diabetic  Antibiotics- none  Prophylaxis- SCDs    Jannie Acosta MD   [facility], PGY2  [phone]  09/24/2019     Inpatient Data     Vitals:   Temp:  [96.9 °F (36.1 °C)-98.9 °F (37.2 °C)] 98.1 °F (36.7 °C)  Pulse:  [73-96] 77  Resp:  [16-22] 17  SpO2:  [90 %-94 %] 90 %  BP: (135-175)/(65-79) 147/65 Intake/Output:  09/23 0701 - 09/24 0700  In: 1084.4 [P.O.:438; I.V.:166.4]  Out: 1000 [Urine:1000]       Recent Labs     09/21/19  1238 09/22/19  0444 09/23/19  0626 09/24/19  0549   WBC  --  8.44 8.92 9.59   HGB  --  9.8* 9.6* 9.2*   HCT  --  30.4* 29.9* 28.7*   PLT  --  286 247 287   NA  --  140 138 136   K  --  3.6 3.6 4.0   CL  --  104 104 101   CO2  --  25 22* 27   BUN  --  40* 42* 42*   CREATININE  --  2.0* 1.9* 2.2*   BILITOT  --  0.5 0.5 0.5   AST  --  24 22 18   ALT  --  12 11 9*   ALKPHOS  --  196* 187* 169*   CALCIUM  --  9.0 9.4 9.4   ALBUMIN  --  3.0* 3.0* 2.8*   PROT  --  8.0 8.3 7.9   MG  --  2.1 2.0 2.0   PHOS  --  2.5* 2.5* 3.5   PREALBUMIN 14*  --   --   --         Scheduled Meds:   aspirin  81 mg Oral Daily    carvedilol  12.5 mg Oral BID WM    fat emulsion 20%  250 mL Intravenous Daily    fluticasone furoate-vilanterol  1 puff Inhalation Daily    furosemide  40 mg Intravenous Daily    heparin (porcine)  5,000 Units Subcutaneous Q8H    NIFEdipine  30 mg Oral Daily    sodium chloride 0.9%  10 mL Intravenous Q6H    spironolactone  50 mg Oral Daily     Continuous Infusions:   Amino acid 4.25% - dextrose 10% (CLINIMIX-E) solution with additives (1L provides 42.5 gm AA, 100 gm CHO (340 kcal/L dextrose), Na 35, K 30, Mg 5, Ca 4.5, Acetate 70, Cl 39, Phos 15) 40 mL/hr at 09/23/19 1739    DOPamine 2 mcg/kg/min (09/23/19 1739)     PRN  Meds:acetaminophen, Dextrose 10% Bolus, Dextrose 10% Bolus, dextrose 50 % in water (D50W), dextrose 50 % in water (D50W), glucagon (human recombinant), glucose, glucose, HYDROcodone-acetaminophen, insulin aspart U-100, ondansetron, Flushing PICC Protocol **AND** sodium chloride 0.9% **AND** sodium chloride 0.9%

## 2019-09-24 NOTE — NURSING
Pt with increased complaints of SOB. 91% on 6LNC. 30 resp rate. Dr. Acosta notified and stated to hold TPN for now and ordered a respiratory treatment.

## 2019-09-24 NOTE — CARE UPDATE
SpO2 86% on 5L nasal cannula, laying down. Pt said she was having trouble breathing and wanted to sit up. SpO2 90% on 6L while sitting on edge of bed. JAMES Sampson notified of pt increasing oxygen requirements and pt sitting of edge of bed. RN stated she was contacting MD. Awaiting new orders.

## 2019-09-24 NOTE — PLAN OF CARE
Patient resting in bed, AAOX4. TPN with lipids and dopamine infusing as ordered. Medications administered as ordered. Patient asleep through most of shift. Ambulating to bathroom with assist, safety maintained. Telemetry on, NSR. Encouraged to call with needs or concerns. Will continue to monitor.

## 2019-09-24 NOTE — NURSING
Dr. Staley at bedside. Patient currently 95% on 6LNC. Dr. Staley stated to order 40mg IV lasix now and to hold TPN overnight.

## 2019-09-24 NOTE — PLAN OF CARE
Recommendation:   1. Continue 2000 Diabetic diet  2. Encourage intake at meals as tolerated     Goals: PO to meet 75% of needs  Nutrition Goal Status: new

## 2019-09-24 NOTE — PLAN OF CARE
Pt on oxygen in no apparent distress.  Aerobika and IS tx. Given with ok pt. Effort.  Will cont. To monitor.

## 2019-09-24 NOTE — CONSULTS
"  [facility]  Adult Nutrition  Consult Note    SUMMARY     Recommendations  Recommendation:   1. Continue 2000 Diabetic diet  2. Encourage intake at meals as tolerated    Goals: PO to meet 75% of needs  Nutrition Goal Status: new    Reason for Assessment  Reason For Assessment: consult  Diagnosis: pancreatic NET  Relevant Medical History: CHF, CKD4, HTN, HLD, IDDM2  General Information Comments: Pt on  Diabetic diet, was on clear liquid this morning and tolerated broth. Pt reports  lbs. NFPE completed -mild wasting of temples.  Nutrition Discharge Planning: Discharge on  Diabetic Diet     Nutrition Risk Screen  Nutrition Risk Screen: no indicators present    Nutrition/Diet History  Spiritual, Cultural Beliefs, Yazidism Practices, Values that Affect Care: no    Anthropometrics  Temp: 97.2 °F (36.2 °C)  Height Method: Stated  Height: 5' 5" (165.1 cm)  Height (inches): 65 in  Weight Method: Bed Scale  Weight: 86.7 kg (191 lb 2.2 oz)  Weight (lb): 191.14 lb  Ideal Body Weight (IBW), Female: 125 lb  % Ideal Body Weight, Female (lb): 136.69 lb  BMI (Calculated): 28.5  Usual Body Weight (UBW), k kg  % Usual Body Weight: 112.83     Lab/Procedures/Meds  Pertinent Labs Reviewed: reviewed  Pertinent Labs Comments: Hg 9.2, Ht 28.7, BUN 42, Creatinine 2.2, Glucose 279, Albumin 2.8  Pertinent Medications Reviewed: reviewed  Pertinent Medications Comments: odansetron, bumetanide, carvedilol, heparin    Estimated/Assessed Needs  Weight Used For Calorie Calculations: 86.7 kg (191 lb 2.2 oz)  Energy Calorie Requirements (kcal): 1678.6  Energy Need Method: [place]-St Jeor (x1.25)  Protein Requirements: 87-104g (1.0-1.2g/kg)  Weight Used For Protein Calculations: 86.7 kg (191 lb 2.2 oz)  Estimated Fluid Requirement Method: RDA Method  RDA Method (mL): 1678.6  CHO Requirement: 175g    Nutrition Prescription Ordered  Current Diet Order:  Diabetic  Current Nutrition Support Formula Ordered: " Clinimix 4.25/10  Current Nutrition Support Rate Ordered: 40 (ml)  Current Nutrition Support Frequency Ordered: mL/hr  Oral Nutrition Supplement: daily ivfe    Evaluation of Received Nutrient/Fluid Intake  Parenteral Calories (kcal): 490  Parenteral Protein (gm): 41  Parenteral Fluid (mL): 960  Lipid Calories (kcals): 500 kcals  GIR (Glucose Infusion Rate) (mg/kg/min): 0.7 mg/kg/min  Total Calories (kcal): 990  % Kcal Needs: 59  % Protein Needs: 40  Tolerance: tolerating  % Intake of Estimated Energy Needs: 50 - 75 %  % Meal Intake: 0 - 25 %    Nutrition Risk  2xweekly     Assessment and Plan  Inadequate oral intake     Related to (etiology):  Cancer      Signs and Symptoms (as evidenced by):  Poor intake/TPN     Interventions:  Parenteral nutrition     Nutrition Diagnosis Status:  Continues     Monitor and Evaluation  Food and Nutrient Intake: energy intake, food and beverage intake, parenteral nutrition intake  Food and Nutrient Adminstration: diet order, enteral and parenteral nutrition administration  Anthropometric Measurements: weight, weight change  Biochemical Data, Medical Tests and Procedures: electrolyte and renal panel, gastrointestinal profile, glucose/endocrine profile, inflammatory profile, lipid profile  Nutrition-Focused Physical Findings: overall appearance     Malnutrition Assessment  [place] (Muscle Loss): mild depletion   Subcutaneous Fat Loss (Final Summary): well nourished  Muscle Loss Evaluation (Final Summary): well nourished       Nutrition Follow-Up  RD Follow-up?: Yes

## 2019-09-24 NOTE — PROGRESS NOTES
Notified by respiratory that patient's O2 sat is low even on the 5L nc and very low when laying down. Notified Dr. Baker, stated to have patient sit in chair and administer 1 mg of bumex.

## 2019-09-25 PROBLEM — C77.2 SECONDARY MALIGNANT NEOPLASM OF INTRA-ABDOMINAL LYMPH NODES: Status: ACTIVE | Noted: 2019-09-25

## 2019-09-25 PROBLEM — C25.1 MALIGNANT NEOPLASM OF BODY OF PANCREAS: Status: ACTIVE | Noted: 2019-09-25

## 2019-09-25 LAB
ALBUMIN SERPL BCP-MCNC: 2.7 G/DL (ref 3.5–5.2)
ALP SERPL-CCNC: 160 U/L (ref 55–135)
ALT SERPL W/O P-5'-P-CCNC: 9 U/L (ref 10–44)
ANION GAP SERPL CALC-SCNC: 10 MMOL/L (ref 8–16)
AST SERPL-CCNC: 15 U/L (ref 10–40)
BASOPHILS # BLD AUTO: 0 K/UL (ref 0–0.2)
BASOPHILS NFR BLD: 0 % (ref 0–1.9)
BILIRUB SERPL-MCNC: 0.5 MG/DL (ref 0.1–1)
BUN SERPL-MCNC: 48 MG/DL (ref 8–23)
CALCIUM SERPL-MCNC: 9.5 MG/DL (ref 8.7–10.5)
CHLORIDE SERPL-SCNC: 101 MMOL/L (ref 95–110)
CO2 SERPL-SCNC: 25 MMOL/L (ref 23–29)
CREAT SERPL-MCNC: 2.4 MG/DL (ref 0.5–1.4)
CREAT UR-MCNC: 82.3 MG/DL (ref 15–325)
CREAT UR-MCNC: 82.3 MG/DL (ref 15–325)
DIFFERENTIAL METHOD: ABNORMAL
EOSINOPHIL # BLD AUTO: 0.2 K/UL (ref 0–0.5)
EOSINOPHIL NFR BLD: 2.1 % (ref 0–8)
ERYTHROCYTE [DISTWIDTH] IN BLOOD BY AUTOMATED COUNT: 15.6 % (ref 11.5–14.5)
EST. GFR  (AFRICAN AMERICAN): 21 ML/MIN/1.73 M^2
EST. GFR  (NON AFRICAN AMERICAN): 19 ML/MIN/1.73 M^2
ESTIMATED AVG GLUCOSE: 131 MG/DL (ref 68–131)
FERRITIN SERPL-MCNC: 123 NG/ML (ref 20–300)
GLUCOSE SERPL-MCNC: 175 MG/DL (ref 70–110)
HBA1C MFR BLD HPLC: 6.2 % (ref 4–5.6)
HCT VFR BLD AUTO: 27.5 % (ref 37–48.5)
HGB BLD-MCNC: 8.8 G/DL (ref 12–16)
IRON SERPL-MCNC: 16 UG/DL (ref 30–160)
LYMPHOCYTES # BLD AUTO: 0.7 K/UL (ref 1–4.8)
LYMPHOCYTES NFR BLD: 8.3 % (ref 18–48)
MAGNESIUM SERPL-MCNC: 1.9 MG/DL (ref 1.6–2.6)
MCH RBC QN AUTO: 26.3 PG (ref 27–31)
MCHC RBC AUTO-ENTMCNC: 32 G/DL (ref 32–36)
MCV RBC AUTO: 82 FL (ref 82–98)
MONOCYTES # BLD AUTO: 1.3 K/UL (ref 0.3–1)
MONOCYTES NFR BLD: 14.6 % (ref 4–15)
NEUTROPHILS # BLD AUTO: 6.4 K/UL (ref 1.8–7.7)
NEUTROPHILS NFR BLD: 75 % (ref 38–73)
PHOSPHATE SERPL-MCNC: 3.4 MG/DL (ref 2.7–4.5)
PLATELET # BLD AUTO: 275 K/UL (ref 150–350)
PMV BLD AUTO: 10.2 FL (ref 9.2–12.9)
POCT GLUCOSE: 133 MG/DL (ref 70–110)
POCT GLUCOSE: 184 MG/DL (ref 70–110)
POCT GLUCOSE: 236 MG/DL (ref 70–110)
POTASSIUM SERPL-SCNC: 3.8 MMOL/L (ref 3.5–5.1)
PROT SERPL-MCNC: 7.8 G/DL (ref 6–8.4)
PROT UR-MCNC: 57 MG/DL (ref 0–15)
PROT/CREAT UR: 0.69 MG/G{CREAT} (ref 0–0.2)
RBC # BLD AUTO: 3.35 M/UL (ref 4–5.4)
SATURATED IRON: 10 % (ref 20–50)
SODIUM SERPL-SCNC: 136 MMOL/L (ref 136–145)
SODIUM UR-SCNC: 48 MMOL/L (ref 20–250)
TOTAL IRON BINDING CAPACITY: 158 UG/DL (ref 250–450)
TRANSFERRIN SERPL-MCNC: 107 MG/DL (ref 200–375)
WBC # BLD AUTO: 8.58 K/UL (ref 3.9–12.7)

## 2019-09-25 PROCEDURE — 82728 ASSAY OF FERRITIN: CPT

## 2019-09-25 PROCEDURE — 80053 COMPREHEN METABOLIC PANEL: CPT

## 2019-09-25 PROCEDURE — 83735 ASSAY OF MAGNESIUM: CPT

## 2019-09-25 PROCEDURE — 84156 ASSAY OF PROTEIN URINE: CPT

## 2019-09-25 PROCEDURE — 25000003 PHARM REV CODE 250: Performed by: STUDENT IN AN ORGANIZED HEALTH CARE EDUCATION/TRAINING PROGRAM

## 2019-09-25 PROCEDURE — 99900035 HC TECH TIME PER 15 MIN (STAT)

## 2019-09-25 PROCEDURE — 36415 COLL VENOUS BLD VENIPUNCTURE: CPT

## 2019-09-25 PROCEDURE — A4216 STERILE WATER/SALINE, 10 ML: HCPCS | Performed by: STUDENT IN AN ORGANIZED HEALTH CARE EDUCATION/TRAINING PROGRAM

## 2019-09-25 PROCEDURE — 94640 AIRWAY INHALATION TREATMENT: CPT

## 2019-09-25 PROCEDURE — 85025 COMPLETE CBC W/AUTO DIFF WBC: CPT

## 2019-09-25 PROCEDURE — 83036 HEMOGLOBIN GLYCOSYLATED A1C: CPT

## 2019-09-25 PROCEDURE — 84100 ASSAY OF PHOSPHORUS: CPT

## 2019-09-25 PROCEDURE — 63600175 PHARM REV CODE 636 W HCPCS: Performed by: STUDENT IN AN ORGANIZED HEALTH CARE EDUCATION/TRAINING PROGRAM

## 2019-09-25 PROCEDURE — 94799 UNLISTED PULMONARY SVC/PX: CPT

## 2019-09-25 PROCEDURE — 27000221 HC OXYGEN, UP TO 24 HOURS

## 2019-09-25 PROCEDURE — 84300 ASSAY OF URINE SODIUM: CPT

## 2019-09-25 PROCEDURE — 94664 DEMO&/EVAL PT USE INHALER: CPT

## 2019-09-25 PROCEDURE — 94761 N-INVAS EAR/PLS OXIMETRY MLT: CPT

## 2019-09-25 PROCEDURE — 21400001 HC TELEMETRY ROOM

## 2019-09-25 PROCEDURE — 83540 ASSAY OF IRON: CPT

## 2019-09-25 RX ORDER — FUROSEMIDE 10 MG/ML
80 INJECTION INTRAMUSCULAR; INTRAVENOUS ONCE
Status: COMPLETED | OUTPATIENT
Start: 2019-09-25 | End: 2019-09-25

## 2019-09-25 RX ORDER — FUROSEMIDE 10 MG/ML
160 INJECTION INTRAMUSCULAR; INTRAVENOUS 2 TIMES DAILY
Status: DISCONTINUED | OUTPATIENT
Start: 2019-09-25 | End: 2019-09-30

## 2019-09-25 RX ORDER — FUROSEMIDE 10 MG/ML
160 INJECTION INTRAMUSCULAR; INTRAVENOUS ONCE
Status: DISCONTINUED | OUTPATIENT
Start: 2019-09-25 | End: 2019-09-25

## 2019-09-25 RX ADMIN — NIFEDIPINE 30 MG: 30 TABLET, FILM COATED, EXTENDED RELEASE ORAL at 09:09

## 2019-09-25 RX ADMIN — CARVEDILOL 12.5 MG: 12.5 TABLET, FILM COATED ORAL at 04:09

## 2019-09-25 RX ADMIN — FUROSEMIDE 160 MG: 10 INJECTION, SOLUTION INTRAVENOUS at 05:09

## 2019-09-25 RX ADMIN — HEPARIN SODIUM 5000 UNITS: 5000 INJECTION, SOLUTION INTRAVENOUS; SUBCUTANEOUS at 09:09

## 2019-09-25 RX ADMIN — INSULIN ASPART 4 UNITS: 100 INJECTION, SOLUTION INTRAVENOUS; SUBCUTANEOUS at 11:09

## 2019-09-25 RX ADMIN — FUROSEMIDE 80 MG: 10 INJECTION, SOLUTION INTRAVENOUS at 10:09

## 2019-09-25 RX ADMIN — HEPARIN SODIUM 5000 UNITS: 5000 INJECTION, SOLUTION INTRAVENOUS; SUBCUTANEOUS at 05:09

## 2019-09-25 RX ADMIN — Medication 10 ML: at 11:09

## 2019-09-25 RX ADMIN — FUROSEMIDE 80 MG: 10 INJECTION, SOLUTION INTRAVENOUS at 09:09

## 2019-09-25 RX ADMIN — Medication 10 ML: at 05:09

## 2019-09-25 RX ADMIN — HEPARIN SODIUM 5000 UNITS: 5000 INJECTION, SOLUTION INTRAVENOUS; SUBCUTANEOUS at 02:09

## 2019-09-25 RX ADMIN — CARVEDILOL 12.5 MG: 12.5 TABLET, FILM COATED ORAL at 09:09

## 2019-09-25 RX ADMIN — ASPIRIN 81 MG: 81 TABLET, COATED ORAL at 09:09

## 2019-09-25 NOTE — PLAN OF CARE
Patient is awake and alert.  Patient is dyspneic on exertion.  Tachypneic while in bed.  O2 at 6L humidified and per nasal cannula in use.  Voids without any difficulty.  Denies pain.  Sits up in chair for a couple of hours.  Tolerates all meals without any nausea or vomiting.  Safety is maintained with bed low, wheels locked and side rails up.  Call light within reach.  Will continue to monitor.

## 2019-09-25 NOTE — PLAN OF CARE
VN cued into room.  Difficulties with camera.  Unable to view patient.  Pt stated she had no needs at this time.  VN will continue to monitor.

## 2019-09-25 NOTE — PLAN OF CARE
Pt. On 6L O2 Nasal canula. Pt is oriented x4. Pt complained of headache prn tylenol was given. Headache was relieved. Pt vitals stabled and safety was maintained

## 2019-09-25 NOTE — PROGRESS NOTES
[facility]  Progress Note    Admit Date: 9/20/2019  Hospital Day: 5  Procedure: 2 Days Post-Op s/p EUS with biopsy    Subjective:  NAEO. AFVSS.   Intermittent HTN.  Tolerating diet, no nausea.   No chest pain. +SOB. On 6L O2 NC.   Sitting up in the chair, breathing comfortably on O2 NC. Breathing easier sitting up.  On renal dopa. On spironolactone.  Lasix given for diuresis yesterday.  No nausea or emesis. Reports minimal appetite.     Per GI- EUS done. Many malignant appearing lymph nodes visualized in celiac region, peripancreatic region, richadr hepatis. Hypoechoic. Fine needle biopsy performed. Core needle biopsy performed.  -one stent visualized endosonographically in CBD  -cystic lesions in the pancreatic body and tail    Per cards- increased coreg to 12.5 BID, official ECHO. Pt is at moderate risk for moderate risk procedure    Physical Exam:  Gen: no acute distress. Alert and oriented x3.  HEENT: normocephalic and atraumatic. EOMI. MMM.  Resp: unlabored respirations, intermittent tachypnia, on O2 NC  CV: regular rate, distal pulses intact  Abd: soft, obese, no tenderness or distention  Ext: warm and well perfused, mild swelling  MSK: strength grossly intact  Neuro: no focal deficits, normal sensation    Labs reviewed  H/H stable  WBC 8.5  K 3.8  Cr 2.4    I/O (last 24 hours): +431 mL  UOP: 250 recorded    Assessment/Plan: 79 y.o. 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  She has a pertinent past medical history of congestive heart failure which currently appears to be decompensated in terms of her current symptoms of shortness of breath and increased minimal lower extremity edema.   Pt will need contrast enhanced CT versus MRCP when able to give contrast, given kidney function, and FDG PET.    -s/p EUS   -SSI for DM  -increased coreg to 12.5 BID per  Cards, stopped hydralazine  -replace electrolytes IV via PICC  -pain control, anti-emetics as needed  -Renal consulted for evaluation and recommendation for diuresis and optimization for possible contrast study  -will give 160 mg lasix for diuresis  -SCDs for ppx    Diet- NPO  Antibiotics- none  Prophylaxis- SCDs     Jannie Acosta MD   [facility], PGY2  [phone]  09/25/2019     Inpatient Data     Vitals:   Temp:  [97.2 °F (36.2 °C)-98.5 °F (36.9 °C)] 97.9 °F (36.6 °C)  Pulse:  [65-82] 76  Resp:  [18-30] 20  SpO2:  [90 %-97 %] 96 %  BP: (144-155)/(62-66) 148/66 Intake/Output:  09/24 0701 - 09/25 0700  In: 681.3 [P.O.:185; I.V.:207.6]  Out: 250 [Urine:250]       Recent Labs     09/23/19  0626 09/24/19  0549 09/25/19  0458   WBC 8.92 9.59 8.58   HGB 9.6* 9.2* 8.8*   HCT 29.9* 28.7* 27.5*    287 275    136 136   K 3.6 4.0 3.8    101 101   CO2 22* 27 25   BUN 42* 42* 48*   CREATININE 1.9* 2.2* 2.4*   BILITOT 0.5 0.5 0.5   AST 22 18 15   ALT 11 9* 9*   ALKPHOS 187* 169* 160*   CALCIUM 9.4 9.4 9.5   ALBUMIN 3.0* 2.8* 2.7*   PROT 8.3 7.9 7.8   MG 2.0 2.0 1.9   PHOS 2.5* 3.5 3.4        Scheduled Meds:   aspirin  81 mg Oral Daily    carvedilol  12.5 mg Oral BID WM    fluticasone furoate-vilanterol  1 puff Inhalation Daily    furosemide  160 mg Intravenous BID    heparin (porcine)  5,000 Units Subcutaneous Q8H    NIFEdipine  30 mg Oral Daily    sodium chloride 0.9%  10 mL Intravenous Q6H     Continuous Infusions:   Amino acid 4.25% - dextrose 10% (CLINIMIX-E) solution with additives (1L provides 42.5 gm AA, 100 gm CHO (340 kcal/L dextrose), Na 35, K 30, Mg 5, Ca 4.5, Acetate 70, Cl 39, Phos 15)      DOPamine 2 mcg/kg/min (09/23/19 6644)     PRN Meds:acetaminophen, Dextrose 10% Bolus, Dextrose 10% Bolus, dextrose 50 % in water (D50W), dextrose 50 % in water (D50W), glucagon (human recombinant), glucose, glucose, HYDROcodone-acetaminophen, insulin aspart U-100, ondansetron, Flushing PICC  Protocol **AND** sodium chloride 0.9% **AND** sodium chloride 0.9%

## 2019-09-25 NOTE — CONSULTS
[facility] Consult Note    Date of Admit: 9/20/2019    Chief Complaint/Reason for Consult     CKD 4    Subjective:      History of Present Illness:  Pt is a 80 yo F, PMH of CKD 4 (bl Cr 2.2), follows in [place] with nephro, DM II, HTN, HLD, malignant neuroendocrine tumor of pancreas, who we were consulted on for CKD 4. Pt was transferred here for surgical specialties regarding her pancretic tumor. She also had SOB and O2 requirement with evidence of CHF exacerbation. Her Cr was 1.9 on admit and has trneded up slightly to 2.4. Initially there was concern about KIKI however after calling her nephro office, baseline is 2.2. Pt has been getting lasix though not ore than 80 mg at a time with subadequate UOP and no change in sx. She reports her legs were swollen before admission but has improved. She denies NSAID use. Today she has SOB, no CP, no palpitations, no nausea.    Past Medical History:  Past Medical History:   Diagnosis Date    Arthritis     CKD (chronic kidney disease)     DM (diabetes mellitus)     GERD (gastroesophageal reflux disease)     HTN (hypertension)     Hyperlipidemia     Primary malignant neuroendocrine neoplasm of pancreas        Past Surgical History:  Past Surgical History:   Procedure Laterality Date    ENDOSCOPIC ULTRASOUND OF UPPER GASTROINTESTINAL TRACT Left 9/23/2019    Procedure: EUS with core biopsy;  Surgeon: Michel Smart MD;  Location: [facility];  Service: Endoscopy;  Laterality: Left;       Allergies:  Review of patient's allergies indicates:   Allergen Reactions    Epinephrine      Neuroendocrine Tumor patient         Home Medications:  Prior to Admission medications    Medication Sig Start Date End Date Taking? Authorizing Provider   amitriptyline (ELAVIL) 25 MG tablet Take 25 mg by mouth nightly as needed for Insomnia.   Yes Historical Provider, MD   aspirin 325 MG tablet Take 325 mg by mouth once daily.    Yes Historical Provider, MD   budesonide-formoterol 160-4.5 mcg  (SYMBICORT) 160-4.5 mcg/actuation HFAA Inhale 2 puffs into the lungs every 12 (twelve) hours. Controller   Yes Historical Provider, MD   calcitRIOL (ROCALTROL) 0.5 MCG Cap Take 0.5 mcg by mouth once daily.   Yes Historical Provider, MD   carvedilol (COREG) 6.25 MG tablet Take 6.25 mg by mouth 2 (two) times daily with meals.   Yes Historical Provider, MD   ergocalciferol (ERGOCALCIFEROL) 50,000 unit Cap Take 50,000 Units by mouth Daily.    Yes Historical Provider, MD   furosemide (LASIX) 20 MG tablet Take 20 mg by mouth 2 (two) times daily.   Yes Historical Provider, MD   hydrALAZINE (APRESOLINE) 10 MG tablet Take 10 mg by mouth 3 (three) times daily.   Yes Historical Provider, MD   HYDROcodone-acetaminophen (NORCO) 5-325 mg per tablet Take 1 tablet by mouth every 6 (six) hours as needed for Pain.   Yes Historical Provider, MD   linaGLIPtin (TRADJENTA) 5 mg Tab tablet Take 5 mg by mouth once daily.   Yes Historical Provider, MD   losartan (COZAAR) 100 MG tablet Take 100 mg by mouth once daily.   Yes Historical Provider, MD   NIFEdipine (ADALAT CC) 30 MG TbSR Take 30 mg by mouth once daily.   Yes Historical Provider, MD   dicyclomine (BENTYL) 20 mg tablet Take 20 mg by mouth every 6 (six) hours.    Historical Provider, MD   insulin (LANTUS SOLOSTAR U-100 INSULIN) glargine 100 units/mL (3mL) SubQ pen Inject 20 Units into the skin.    Historical Provider, MD   ranitidine (ZANTAC) 150 MG capsule Take 150 mg by mouth 2 (two) times daily as needed.     Historical Provider, MD       Family History:  History reviewed. No pertinent family history.    Social History:  Social History     Tobacco Use    Smoking status: Never Smoker    Smokeless tobacco: Never Used   Substance Use Topics    Alcohol use: Never     Frequency: Never    Drug use: Never       Review of Systems:  Pertinent positives and negatives are listed in HPI.  All other systems are reviewed and are negative.     Objective:   Last 24 Hour Vital Signs:  BP   Min: 144/62  Max: 155/66  Temp  Av.9 °F (36.6 °C)  Min: 97.2 °F (36.2 °C)  Max: 98.5 °F (36.9 °C)  Pulse  Av.5  Min: 65  Max: 82  Resp  Av.4  Min: 18  Max: 30  SpO2  Av.3 %  Min: 90 %  Max: 97 %  Weight  Av.5 kg (186 lb 4.6 oz)  Min: 84.5 kg (186 lb 4.6 oz)  Max: 84.5 kg (186 lb 4.6 oz)  Body mass index is 31 kg/m².  I/O last 3 completed shifts:  In: 1599.3 [P.O.:623; I.V.:207.6]  Out: 1050 [Urine:1050]    Physical Examination:  Gen: NAD, AAOx3  HEENT: NCAT, EOMI  Neck: supple, no thyromeg  Cardio: RRR, normal S1/S2, no MRG, ext jugular distended, JVp difficult to visualize  Lungs: mild inc in wob, on 6L NS, crackles bl up to mid scapula  Abd: soft non tender, non distended, normal bowel sounds, no hepatosplenomegaly  Ext: 1+ pitting edema b/l LE  Skin: warm, dry, no rashes noted  Neuro: moves all ext, sensation intact  Psych: conversational, responsive      Laboratory:  Most Recent Data:  CBC:   Lab Results   Component Value Date    WBC 8.58 2019    HGB 8.8 (L) 2019    HCT 27.5 (L) 2019     2019    MCV 82 2019    RDW 15.6 (H) 2019     BMP:   Lab Results   Component Value Date     2019    K 3.8 2019     2019    CO2 25 2019    BUN 48 (H) 2019    CREATININE 2.4 (H) 2019     (H) 2019    CALCIUM 9.5 2019    MG 1.9 2019    PHOS 3.4 2019     LFTs:   Lab Results   Component Value Date    PROT 7.8 2019    ALBUMIN 2.7 (L) 2019    BILITOT 0.5 2019    AST 15 2019    ALKPHOS 160 (H) 2019    ALT 9 (L) 2019     Coags: No results found for: INR, PROTIME, PTT  Urinalysis:   Lab Results   Component Value Date    LABURIN  2019     Multiple organisms isolated. None in predominance.  Repeat if    LABURIN clinically necessary. 2019    COLORU Yellow 2019    SPECGRAV 1.010 2019    NITRITE Negative 2019    KETONESU Negative  09/21/2019    UROBILINOGEN Negative 09/21/2019    WBCUA 60 (H) 09/21/2019       Trended Lab Data:  Recent Labs   Lab 09/23/19  0626 09/24/19  0549 09/25/19  0458   WBC 8.92 9.59 8.58   HGB 9.6* 9.2* 8.8*   HCT 29.9* 28.7* 27.5*    287 275   MCV 83 83 82   RDW 15.9* 15.9* 15.6*    136 136   K 3.6 4.0 3.8    101 101   CO2 22* 27 25   BUN 42* 42* 48*   CREATININE 1.9* 2.2* 2.4*   * 279* 175*   PROT 8.3 7.9 7.8   ALBUMIN 3.0* 2.8* 2.7*   BILITOT 0.5 0.5 0.5   AST 22 18 15   ALKPHOS 187* 169* 160*   ALT 11 9* 9*       Trended Cardiac Data:  Recent Labs   Lab 09/21/19  0522 09/24/19  1530   * 557*          Assessment and Plan:      Nidia Martel is a 79 y.o.female with  Patient Active Problem List    Diagnosis Date Noted    Malignant neoplasm of body of pancreas 09/25/2019    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    Malnutrition compromising bodily function 09/22/2019    Renal insufficiency 09/21/2019    Type 2 diabetes mellitus with chronic kidney disease, with long-term current use of insulin 09/21/2019    Obesity due to excess calories with serious comorbidity 09/21/2019    Hypertension due to endocrine disorder 09/21/2019    Pancreatic mass 09/20/2019    Congestive heart failure 09/20/2019      Volume overload in CKD 4  -Presents with shortness of breath, evidence of volume overload, poor response to lasix thus far  -UA with 1+ proteinuria, BNP elevated 557, CXR with cardiomegaly and interstial markings  -Would recommend inc to lasix 160 bid, if poor response, can add metolazone. Avoid renal toxic meds, strict I/O's, fluid restrict to 1.5L/d    CKD 4  -Follows with Dr. Josh Henderson in [facility], [place]  -Will attempt to get records, management as above    HTN  -Mildly above goal, dopamine may be contributing, held aldactone and hold ARB for now, can add amlodipine if needed though will monitor    Normocytic anemia  -Check Fe studies, monitor. Has  been dropping. No evidence of bleeding now    MBD  -Check PTH, phos, vit D      Fernando Jorgensen  [facility]  IV

## 2019-09-25 NOTE — PLAN OF CARE
Patient on oxygen with documented flow. Will attempt to wean per O2 order protocol.  Will continue to monitor.

## 2019-09-26 PROBLEM — E11.21 DIABETIC NEPHROPATHY: Status: ACTIVE | Noted: 2019-09-26

## 2019-09-26 PROBLEM — N25.81 SECONDARY HYPERPARATHYROIDISM OF RENAL ORIGIN: Status: ACTIVE | Noted: 2019-09-26

## 2019-09-26 LAB
25(OH)D3+25(OH)D2 SERPL-MCNC: 14 NG/ML (ref 30–96)
ALBUMIN SERPL BCP-MCNC: 2.8 G/DL (ref 3.5–5.2)
ALP SERPL-CCNC: 171 U/L (ref 55–135)
ALT SERPL W/O P-5'-P-CCNC: 8 U/L (ref 10–44)
ANION GAP SERPL CALC-SCNC: 10 MMOL/L (ref 8–16)
AST SERPL-CCNC: 16 U/L (ref 10–40)
BASOPHILS # BLD AUTO: 0 K/UL (ref 0–0.2)
BASOPHILS NFR BLD: 0 % (ref 0–1.9)
BILIRUB SERPL-MCNC: 0.4 MG/DL (ref 0.1–1)
BUN SERPL-MCNC: 56 MG/DL (ref 8–23)
CALCIUM SERPL-MCNC: 9.5 MG/DL (ref 8.7–10.5)
CHLORIDE SERPL-SCNC: 101 MMOL/L (ref 95–110)
CO2 SERPL-SCNC: 25 MMOL/L (ref 23–29)
CREAT SERPL-MCNC: 2.3 MG/DL (ref 0.5–1.4)
DIFFERENTIAL METHOD: ABNORMAL
EOSINOPHIL # BLD AUTO: 0.1 K/UL (ref 0–0.5)
EOSINOPHIL NFR BLD: 1.3 % (ref 0–8)
ERYTHROCYTE [DISTWIDTH] IN BLOOD BY AUTOMATED COUNT: 15.6 % (ref 11.5–14.5)
EST. GFR  (AFRICAN AMERICAN): 23 ML/MIN/1.73 M^2
EST. GFR  (NON AFRICAN AMERICAN): 20 ML/MIN/1.73 M^2
GLUCOSE SERPL-MCNC: 208 MG/DL (ref 70–110)
HCT VFR BLD AUTO: 27.4 % (ref 37–48.5)
HGB BLD-MCNC: 8.9 G/DL (ref 12–16)
LYMPHOCYTES # BLD AUTO: 0.7 K/UL (ref 1–4.8)
LYMPHOCYTES NFR BLD: 8.4 % (ref 18–48)
MAGNESIUM SERPL-MCNC: 1.7 MG/DL (ref 1.6–2.6)
MCH RBC QN AUTO: 26.3 PG (ref 27–31)
MCHC RBC AUTO-ENTMCNC: 32.5 G/DL (ref 32–36)
MCV RBC AUTO: 81 FL (ref 82–98)
MONOCYTES # BLD AUTO: 1.2 K/UL (ref 0.3–1)
MONOCYTES NFR BLD: 14.6 % (ref 4–15)
NEUTROPHILS # BLD AUTO: 6.2 K/UL (ref 1.8–7.7)
NEUTROPHILS NFR BLD: 75.7 % (ref 38–73)
PHOSPHATE SERPL-MCNC: 3.2 MG/DL (ref 2.7–4.5)
PHOSPHATE SERPL-MCNC: 3.2 MG/DL (ref 2.7–4.5)
PLATELET # BLD AUTO: 284 K/UL (ref 150–350)
PMV BLD AUTO: 10.4 FL (ref 9.2–12.9)
POCT GLUCOSE: 132 MG/DL (ref 70–110)
POCT GLUCOSE: 182 MG/DL (ref 70–110)
POCT GLUCOSE: 186 MG/DL (ref 70–110)
POCT GLUCOSE: 203 MG/DL (ref 70–110)
POCT GLUCOSE: 209 MG/DL (ref 70–110)
POTASSIUM SERPL-SCNC: 3.9 MMOL/L (ref 3.5–5.1)
PROT SERPL-MCNC: 8 G/DL (ref 6–8.4)
PTH-INTACT SERPL-MCNC: 339.6 PG/ML (ref 9–77)
RBC # BLD AUTO: 3.38 M/UL (ref 4–5.4)
SODIUM SERPL-SCNC: 136 MMOL/L (ref 136–145)
WBC # BLD AUTO: 8.24 K/UL (ref 3.9–12.7)

## 2019-09-26 PROCEDURE — 94664 DEMO&/EVAL PT USE INHALER: CPT

## 2019-09-26 PROCEDURE — 97803 MED NUTRITION INDIV SUBSEQ: CPT

## 2019-09-26 PROCEDURE — 25000003 PHARM REV CODE 250: Performed by: STUDENT IN AN ORGANIZED HEALTH CARE EDUCATION/TRAINING PROGRAM

## 2019-09-26 PROCEDURE — 94799 UNLISTED PULMONARY SVC/PX: CPT

## 2019-09-26 PROCEDURE — 84100 ASSAY OF PHOSPHORUS: CPT

## 2019-09-26 PROCEDURE — 63600175 PHARM REV CODE 636 W HCPCS: Performed by: STUDENT IN AN ORGANIZED HEALTH CARE EDUCATION/TRAINING PROGRAM

## 2019-09-26 PROCEDURE — 80053 COMPREHEN METABOLIC PANEL: CPT

## 2019-09-26 PROCEDURE — 83735 ASSAY OF MAGNESIUM: CPT

## 2019-09-26 PROCEDURE — 27000221 HC OXYGEN, UP TO 24 HOURS

## 2019-09-26 PROCEDURE — 82306 VITAMIN D 25 HYDROXY: CPT

## 2019-09-26 PROCEDURE — 21400001 HC TELEMETRY ROOM

## 2019-09-26 PROCEDURE — 51702 INSERT TEMP BLADDER CATH: CPT

## 2019-09-26 PROCEDURE — 85025 COMPLETE CBC W/AUTO DIFF WBC: CPT

## 2019-09-26 PROCEDURE — 94761 N-INVAS EAR/PLS OXIMETRY MLT: CPT

## 2019-09-26 PROCEDURE — A4216 STERILE WATER/SALINE, 10 ML: HCPCS | Performed by: STUDENT IN AN ORGANIZED HEALTH CARE EDUCATION/TRAINING PROGRAM

## 2019-09-26 PROCEDURE — 83970 ASSAY OF PARATHORMONE: CPT

## 2019-09-26 PROCEDURE — 99900035 HC TECH TIME PER 15 MIN (STAT)

## 2019-09-26 RX ORDER — METOLAZONE 5 MG/1
10 TABLET ORAL DAILY
Status: DISCONTINUED | OUTPATIENT
Start: 2019-09-26 | End: 2019-09-30

## 2019-09-26 RX ADMIN — HEPARIN SODIUM 5000 UNITS: 5000 INJECTION, SOLUTION INTRAVENOUS; SUBCUTANEOUS at 01:09

## 2019-09-26 RX ADMIN — CARVEDILOL 12.5 MG: 12.5 TABLET, FILM COATED ORAL at 05:09

## 2019-09-26 RX ADMIN — INSULIN ASPART 2 UNITS: 100 INJECTION, SOLUTION INTRAVENOUS; SUBCUTANEOUS at 05:09

## 2019-09-26 RX ADMIN — FUROSEMIDE 160 MG: 10 INJECTION, SOLUTION INTRAVENOUS at 09:09

## 2019-09-26 RX ADMIN — FUROSEMIDE 160 MG: 10 INJECTION, SOLUTION INTRAVENOUS at 05:09

## 2019-09-26 RX ADMIN — HEPARIN SODIUM 5000 UNITS: 5000 INJECTION, SOLUTION INTRAVENOUS; SUBCUTANEOUS at 09:09

## 2019-09-26 RX ADMIN — NIFEDIPINE 30 MG: 30 TABLET, FILM COATED, EXTENDED RELEASE ORAL at 08:09

## 2019-09-26 RX ADMIN — Medication 10 ML: at 05:09

## 2019-09-26 RX ADMIN — FLUTICASONE FUROATE AND VILANTEROL TRIFENATATE 1 PUFF: 100; 25 POWDER RESPIRATORY (INHALATION) at 07:09

## 2019-09-26 RX ADMIN — INSULIN ASPART 4 UNITS: 100 INJECTION, SOLUTION INTRAVENOUS; SUBCUTANEOUS at 12:09

## 2019-09-26 RX ADMIN — METOLAZONE 10 MG: 5 TABLET ORAL at 08:09

## 2019-09-26 RX ADMIN — CARVEDILOL 12.5 MG: 12.5 TABLET, FILM COATED ORAL at 08:09

## 2019-09-26 RX ADMIN — Medication 10 ML: at 12:09

## 2019-09-26 RX ADMIN — ASPIRIN 81 MG: 81 TABLET, COATED ORAL at 08:09

## 2019-09-26 RX ADMIN — HEPARIN SODIUM 5000 UNITS: 5000 INJECTION, SOLUTION INTRAVENOUS; SUBCUTANEOUS at 06:09

## 2019-09-26 NOTE — PROGRESS NOTES
[facility] Consult Note    Date of Admit: 2019      Subjective:    Was sob last night and moved into a chair to sit up and now feeling better. UOP better than in previous days but still requiring O2, now on 5L  Objective:   Last 24 Hour Vital Signs:  BP  Min: 122/60  Max: 138/70  Temp  Av °F (36.7 °C)  Min: 97.5 °F (36.4 °C)  Max: 98.5 °F (36.9 °C)  Pulse  Av.4  Min: 72  Max: 83  Resp  Av.7  Min: 17  Max: 24  SpO2  Av.8 %  Min: 95 %  Max: 98 %  Weight  Av.4 kg (141 lb 15.6 oz)  Min: 64.4 kg (141 lb 15.6 oz)  Max: 64.4 kg (141 lb 15.6 oz)  Body mass index is 23.63 kg/m².  I/O last 3 completed shifts:  In: 651.6 [P.O.:440; I.V.:211.6]  Out: 650 [Urine:650]    Physical Examination:  Gen: NAD, AAOx3, sitting up in bed  HEENT: NCAT, EOMI  Cardio: RRR, normal S1/S2, no MRG, ext jugular distended, JVP difficult to visualize  Lungs: mild inc in wob, on 5L NS, bibasilar crackles  Abd: soft non tender, non distended, normal bowel sounds, no hepatosplenomegaly  Ext: 1+ pitting edema b/l LE  Skin: warm, dry, no rashes noted  Neuro: moves all ext, sensation intact  Psych: conversational, responsive      Laboratory:  Most Recent Data:  CBC:   Lab Results   Component Value Date    WBC 8.24 2019    HGB 8.9 (L) 2019    HCT 27.4 (L) 2019     2019    MCV 81 (L) 2019    RDW 15.6 (H) 2019     BMP:   Lab Results   Component Value Date     2019    K 3.9 2019     2019    CO2 25 2019    BUN 56 (H) 2019    CREATININE 2.3 (H) 2019     (H) 2019    CALCIUM 9.5 2019    MG 1.7 2019    PHOS 3.2 2019    PHOS 3.2 2019     LFTs:   Lab Results   Component Value Date    PROT 8.0 2019    ALBUMIN 2.8 (L) 2019    BILITOT 0.4 2019    AST 16 2019    ALKPHOS 171 (H) 2019    ALT 8 (L) 2019     Coags: No results found for: INR, PROTIME, PTT  Urinalysis:   Lab  Results   Component Value Date    LABURIN  09/21/2019     Multiple organisms isolated. None in predominance.  Repeat if    LABURIN clinically necessary. 09/21/2019    COLORU Yellow 09/21/2019    SPECGRAV 1.010 09/21/2019    NITRITE Negative 09/21/2019    KETONESU Negative 09/21/2019    UROBILINOGEN Negative 09/21/2019    WBCUA 60 (H) 09/21/2019       Trended Lab Data:  Recent Labs   Lab 09/24/19  0549 09/25/19  0458 09/26/19  0455   WBC 9.59 8.58 8.24   HGB 9.2* 8.8* 8.9*   HCT 28.7* 27.5* 27.4*    275 284   MCV 83 82 81*   RDW 15.9* 15.6* 15.6*    136 136   K 4.0 3.8 3.9    101 101   CO2 27 25 25   BUN 42* 48* 56*   CREATININE 2.2* 2.4* 2.3*   * 175* 208*   PROT 7.9 7.8 8.0   ALBUMIN 2.8* 2.7* 2.8*   BILITOT 0.5 0.5 0.4   AST 18 15 16   ALKPHOS 169* 160* 171*   ALT 9* 9* 8*       Trended Cardiac Data:  Recent Labs   Lab 09/21/19  0522 09/24/19  1530   * 557*          Assessment and Plan:      Nidia Martel is a 79 y.o.female with  Patient Active Problem List    Diagnosis Date Noted    Malignant neoplasm of body of pancreas 09/25/2019    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    Malnutrition compromising bodily function 09/22/2019    Renal insufficiency 09/21/2019    Type 2 diabetes mellitus with chronic kidney disease, with long-term current use of insulin 09/21/2019    Obesity due to excess calories with serious comorbidity 09/21/2019    Hypertension due to endocrine disorder 09/21/2019    Pancreatic mass 09/20/2019    Congestive heart failure 09/20/2019      Volume overload in CKD 4  -Presents with shortness of breath, evidence of volume overload, poor response to lasix thus far  -UA with 1+ proteinuria, BNP elevated 557, CXR with cardiomegaly and interstial markings  -Cont lasix 160 bid, add metolazone 30 min before lasix, if she still is not urinating ~2L/day would recommend lasix gtt for better titration    CKD 4  -[facility] [place]  -Baseline Cr 2.2    HTN  -Mildly above goal, dopamine may be contributing, held aldactone and hold ARB for now, can add amlodipine if needed though will monitor    Normocytic anemia  -Fe def, would add PO Fe as she is on it at home,  Hgb stable. No evidence of bleeding now    MBD  -Add ergo, PTH within goal,     Prediabetes  -Needs counseling    Fernando Jorgensen  [facility] HO IV

## 2019-09-26 NOTE — NURSING
Castillo catheter placed per MD order, cited urological reasons. Perineal area cleansed with soap/water, sterile technique employed to cleanse perineum with betadine, 14 Fr castillo inserted x1 attempt. Pt tolerated well. 500mL clear yellow urine returned. Castillo care explained to pt. Will continue to monitor urine output.

## 2019-09-26 NOTE — PLAN OF CARE
Pt remains A+Ox4, ambulatory with assist. Tolerating sitting in chair well today. Pt continues to present with dyspnea on exertion, states improvement from yesterday. Currently on 5LNC. Dickson catheter placed, good urinary output, clear yellow urine. Safety precautions maintained.

## 2019-09-26 NOTE — PROGRESS NOTES
"[facility]  Adult Nutrition  Progress Note    SUMMARY       Recommendations  Recommendation:   1. Continue 2000 Diabetic, low sodium diet.   2. Add Boost Glucose (strawberrry) bid  3. Encourage intake at meals as tolerated   4. Consider appetite stimulant    Goals:   PO to meet 75% of needs    Nutrition Goal Status: goal not met    Reason for Assessment  Reason For Assessment: RD follow-up  Diagnosis: pancreatic NET  Relevant Medical History: CHF, CKD4, HTN, HLD, IDDM2  General Information Comments: Pt reports eating less than 25% of meals, little appetite, and meals having large carb portions for a diabetic diet. Pt receptive to Boost Glucose recommendation. May benefit from appetite stimulant. NFPE completed - mild wasting of temples  Nutrition Discharge Planning: D/c on  Diabetic Diet; low sodium     Nutrition Risk Screen  Nutrition Risk Screen: no indicators present    Nutrition/Diet History  Spiritual, Cultural Beliefs, Yazidism Practices, Values that Affect Care: no    Anthropometrics  Temp: 98.6 °F (37 °C)  Height Method: Stated  Height: 5' 5" (165.1 cm)  Height (inches): 65 in  Weight Method: Bed Scale  Weight: 64.4 kg (141 lb 15.6 oz)  Weight (lb): 141.98 lb  Ideal Body Weight (IBW), Female: 125 lb  % Ideal Body Weight, Female (lb): 136.69 lb  BMI (Calculated): 28.5  Usual Body Weight (UBW), k kg  % Usual Body Weight: 112.83     Lab/Procedures/Meds  Pertinent Labs Reviewed: reviewed  Pertinent Labs Comments: hg 8.9, Ht 27.4, BUN 56, Cr 2.3, Glucose 208, Albumin 2.8  Pertinent Medications Reviewed: reviewed  Pertinent Medications Comments: furosemide, carvedilol, heparin    Estimated/Assessed Needs  Weight Used For Calorie Calculations: 84.5 kg (186 lb 4.6 oz)  Energy Calorie Requirements (kcal): 1651  Energy Need Method: [place]-St Jeor(PAL 1.25)  Protein Requirements: 101.61(1.2g)  Weight Used For Protein Calculations: 84.5 kg (186 lb 4.6 oz)  Fluid Requirements (mL): " 1651  Estimated Fluid Requirement Method: RDA Method  RDA Method (mL): 1651  CHO Requirement: 175g    Nutrition Prescription Ordered  Current Diet Order: 2000 Diabetic, low sodium   Nutrition Order Comments: TPN discontinued     Evaluation of Received Nutrient/Fluid Intake  % Intake of Estimated Energy Needs: 0 - 25 %  % Meal Intake: 0 - 25 %    Nutrition Risk  Level of Risk/Frequency of Follow-up: f/u 2x/wk     Assessment and Plan  Inadequate oral intake     Related to (etiology):  Cancer      Signs and Symptoms (as evidenced by):  Poor intake     Interventions:  Commercial beverage - Boost Glucose      Nutrition Diagnosis Status:  Continues    Monitor and Evaluation  Food and Nutrient Intake: energy intake, food and beverage intake, parenteral nutrition intake  Food and Nutrient Adminstration: diet order, enteral and parenteral nutrition administration  Anthropometric Measurements: weight, weight change  Biochemical Data, Medical Tests and Procedures: electrolyte and renal panel, gastrointestinal profile, glucose/endocrine profile, inflammatory profile, lipid profile  Nutrition-Focused Physical Findings: overall appearance     Malnutrition Assessment  [place] (Muscle Loss): mild depletion    Subcutaneous Fat Loss (Final Summary): well nourished  Muscle Loss Evaluation (Final Summary): well nourished       Nutrition Follow-Up  RD Follow-up?: Yes    Note Completed by Jacqui Acevedo ([facility] Dietetic Intern)    I certify that I, Venice Reynaga RD, directed the dietetic intern in service delivery and guided them using my skilled judgment. As the cosigning dietitian, I have reviewed the dietetic interns documentation and am responsible for the treatment, assessment, and plan.

## 2019-09-26 NOTE — PROGRESS NOTES
[facility]/General Surgery  Progress Note    Admit Date: 9/20/2019  Hospital Day: 6  Procedure: 3 Days Post-Op s/p EUS with biopsy    Subjective:  NAEO. AFVSS.   Intermittent HTN.  Tolerating diet, no nausea.   No chest pain. +SOB. On 6L O2 NC.   Sitting up in the chair, significant sob over night, not able to sleep in bed.  On renal dopa. On spironolactone.  Lasix given for diuresis yesterday, poor response  No nausea or emesis. Reports minimal appetite.     Per GI- EUS done. Many malignant appearing lymph nodes visualized in celiac region, peripancreatic region, richard hepatis. Hypoechoic. Fine needle biopsy performed. Core needle biopsy performed.  -one stent visualized endosonographically in CBD  -cystic lesions in the pancreatic body and tail    Per cards- increased coreg to 12.5 BID, official ECHO. Pt is at moderate risk for moderate risk procedure    Physical Exam:  Gen: no acute distress. Alert and oriented x3.  HEENT: normocephalic and atraumatic. EOMI. MMM.  Resp: unlabored respirations, intermittent tachypnia, on O2 NC  CV: regular rate, distal pulses intact  Abd: soft, obese, no tenderness or distention  Ext: warm and well perfused, mild swelling  MSK: strength grossly intact  Neuro: no focal deficits, normal sensation    Labs reviewed  H/H stable  WBC 8.2  K 3.9  Cr 2.3    I/O (last 24 hours):  UOP: 650 recorded    Assessment/Plan: 79 y.o. 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  She has a pertinent past medical history of congestive heart failure which currently appears to be decompensated in terms of her current symptoms of shortness of breath and increased minimal lower extremity edema.   Pt will need contrast enhanced CT versus MRCP when able to give contrast, given kidney function, and FDG PET.    -s/p EUS   -SSI for DM  -replace electrolytes IV via  PICC  -pain control, anti-emetics as needed  -Renal consulted for evaluation and recommendation for diuresis and optimization for possible contrast study  -will givelasix for diuresis  -SCDs for ppx    Diet- renal  Antibiotics- none  Prophylaxis- SCDs     Gomez Baker MD   [facility] General Surgery, PGY4  [phone]  09/26/2019     Inpatient Data     Vitals:   Temp:  [97.7 °F (36.5 °C)-98.6 °F (37 °C)] 98.6 °F (37 °C)  Pulse:  [72-83] 76  Resp:  [17-24] 20  SpO2:  [95 %-98 %] 98 %  BP: (122-137)/(59-71) 127/59 Intake/Output:  09/25 0701 - 09/26 0700  In: 585.6 [P.O.:440; I.V.:145.6]  Out: 650 [Urine:650]       Recent Labs     09/24/19  0549 09/25/19  0458 09/26/19  0455   WBC 9.59 8.58 8.24   HGB 9.2* 8.8* 8.9*   HCT 28.7* 27.5* 27.4*    275 284    136 136   K 4.0 3.8 3.9    101 101   CO2 27 25 25   BUN 42* 48* 56*   CREATININE 2.2* 2.4* 2.3*   BILITOT 0.5 0.5 0.4   AST 18 15 16   ALT 9* 9* 8*   ALKPHOS 169* 160* 171*   CALCIUM 9.4 9.5 9.5   ALBUMIN 2.8* 2.7* 2.8*   PROT 7.9 7.8 8.0   MG 2.0 1.9 1.7   PHOS 3.5 3.4 3.2  3.2        Scheduled Meds:   aspirin  81 mg Oral Daily    carvedilol  12.5 mg Oral BID WM    fluticasone furoate-vilanterol  1 puff Inhalation Daily    furosemide  160 mg Intravenous BID    heparin (porcine)  5,000 Units Subcutaneous Q8H    metOLazone  10 mg Oral Daily    NIFEdipine  30 mg Oral Daily    sodium chloride 0.9%  10 mL Intravenous Q6H     Continuous Infusions:   DOPamine 2 mcg/kg/min (09/23/19 7325)     PRN Meds:acetaminophen, Dextrose 10% Bolus, Dextrose 10% Bolus, dextrose 50 % in water (D50W), dextrose 50 % in water (D50W), glucagon (human recombinant), glucose, glucose, HYDROcodone-acetaminophen, insulin aspart U-100, ondansetron, Flushing PICC Protocol **AND** sodium chloride 0.9% **AND** sodium chloride 0.9%

## 2019-09-26 NOTE — PLAN OF CARE
Recommendation:   1. Continue 2000 Diabetic, low sodium diet.   2. Add Boost Glucose (strawberrry)   3. Encourage intake at meals as tolerated     Goals:   PO to meet 75% of needs

## 2019-09-27 ENCOUNTER — TELEPHONE (OUTPATIENT)
Dept: NEUROLOGY | Facility: HOSPITAL | Age: 79
End: 2019-09-27

## 2019-09-27 DIAGNOSIS — C25.1 MALIGNANT NEOPLASM OF BODY OF PANCREAS: Primary | ICD-10-CM

## 2019-09-27 LAB
ALBUMIN SERPL BCP-MCNC: 2.8 G/DL (ref 3.5–5.2)
ALP SERPL-CCNC: 186 U/L (ref 55–135)
ALT SERPL W/O P-5'-P-CCNC: 10 U/L (ref 10–44)
ANION GAP SERPL CALC-SCNC: 12 MMOL/L (ref 8–16)
AST SERPL-CCNC: 20 U/L (ref 10–40)
BASOPHILS # BLD AUTO: 0 K/UL (ref 0–0.2)
BASOPHILS NFR BLD: 0 % (ref 0–1.9)
BILIRUB SERPL-MCNC: 0.5 MG/DL (ref 0.1–1)
BUN SERPL-MCNC: 57 MG/DL (ref 8–23)
CALCIUM SERPL-MCNC: 9.6 MG/DL (ref 8.7–10.5)
CHLORIDE SERPL-SCNC: 101 MMOL/L (ref 95–110)
CO2 SERPL-SCNC: 25 MMOL/L (ref 23–29)
CREAT SERPL-MCNC: 2.2 MG/DL (ref 0.5–1.4)
DIFFERENTIAL METHOD: ABNORMAL
EOSINOPHIL # BLD AUTO: 0.2 K/UL (ref 0–0.5)
EOSINOPHIL NFR BLD: 2.3 % (ref 0–8)
ERYTHROCYTE [DISTWIDTH] IN BLOOD BY AUTOMATED COUNT: 15.7 % (ref 11.5–14.5)
EST. GFR  (AFRICAN AMERICAN): 24 ML/MIN/1.73 M^2
EST. GFR  (NON AFRICAN AMERICAN): 21 ML/MIN/1.73 M^2
GLUCOSE SERPL-MCNC: 135 MG/DL (ref 70–110)
HCT VFR BLD AUTO: 27.2 % (ref 37–48.5)
HGB BLD-MCNC: 8.8 G/DL (ref 12–16)
LYMPHOCYTES # BLD AUTO: 0.9 K/UL (ref 1–4.8)
LYMPHOCYTES NFR BLD: 9.2 % (ref 18–48)
MAGNESIUM SERPL-MCNC: 1.6 MG/DL (ref 1.6–2.6)
MCH RBC QN AUTO: 26.3 PG (ref 27–31)
MCHC RBC AUTO-ENTMCNC: 32.4 G/DL (ref 32–36)
MCV RBC AUTO: 81 FL (ref 82–98)
MONOCYTES # BLD AUTO: 1.5 K/UL (ref 0.3–1)
MONOCYTES NFR BLD: 15.9 % (ref 4–15)
NEUTROPHILS # BLD AUTO: 6.6 K/UL (ref 1.8–7.7)
NEUTROPHILS NFR BLD: 72.6 % (ref 38–73)
PHOSPHATE SERPL-MCNC: 3 MG/DL (ref 2.7–4.5)
PHOSPHATE SERPL-MCNC: 3 MG/DL (ref 2.7–4.5)
PLATELET # BLD AUTO: 298 K/UL (ref 150–350)
PMV BLD AUTO: 10.1 FL (ref 9.2–12.9)
POCT GLUCOSE: 131 MG/DL (ref 70–110)
POCT GLUCOSE: 189 MG/DL (ref 70–110)
POCT GLUCOSE: 223 MG/DL (ref 70–110)
POCT GLUCOSE: 97 MG/DL (ref 70–110)
POTASSIUM SERPL-SCNC: 3.6 MMOL/L (ref 3.5–5.1)
PROT SERPL-MCNC: 7.9 G/DL (ref 6–8.4)
RBC # BLD AUTO: 3.34 M/UL (ref 4–5.4)
SODIUM SERPL-SCNC: 138 MMOL/L (ref 136–145)
WBC # BLD AUTO: 9.19 K/UL (ref 3.9–12.7)

## 2019-09-27 PROCEDURE — 83735 ASSAY OF MAGNESIUM: CPT

## 2019-09-27 PROCEDURE — 25000003 PHARM REV CODE 250: Performed by: STUDENT IN AN ORGANIZED HEALTH CARE EDUCATION/TRAINING PROGRAM

## 2019-09-27 PROCEDURE — A4216 STERILE WATER/SALINE, 10 ML: HCPCS | Performed by: STUDENT IN AN ORGANIZED HEALTH CARE EDUCATION/TRAINING PROGRAM

## 2019-09-27 PROCEDURE — 85025 COMPLETE CBC W/AUTO DIFF WBC: CPT

## 2019-09-27 PROCEDURE — 94799 UNLISTED PULMONARY SVC/PX: CPT

## 2019-09-27 PROCEDURE — 63600175 PHARM REV CODE 636 W HCPCS: Performed by: STUDENT IN AN ORGANIZED HEALTH CARE EDUCATION/TRAINING PROGRAM

## 2019-09-27 PROCEDURE — 80053 COMPREHEN METABOLIC PANEL: CPT

## 2019-09-27 PROCEDURE — 84100 ASSAY OF PHOSPHORUS: CPT

## 2019-09-27 PROCEDURE — 27000221 HC OXYGEN, UP TO 24 HOURS

## 2019-09-27 PROCEDURE — 99900035 HC TECH TIME PER 15 MIN (STAT)

## 2019-09-27 PROCEDURE — 94640 AIRWAY INHALATION TREATMENT: CPT

## 2019-09-27 PROCEDURE — 94664 DEMO&/EVAL PT USE INHALER: CPT

## 2019-09-27 PROCEDURE — 11000001 HC ACUTE MED/SURG PRIVATE ROOM

## 2019-09-27 PROCEDURE — 94761 N-INVAS EAR/PLS OXIMETRY MLT: CPT

## 2019-09-27 RX ORDER — ERGOCALCIFEROL 1.25 MG/1
50000 CAPSULE ORAL
Status: DISCONTINUED | OUTPATIENT
Start: 2019-09-27 | End: 2019-09-30 | Stop reason: HOSPADM

## 2019-09-27 RX ADMIN — DOPAMINE HYDROCHLORIDE 2 MCG/KG/MIN: 160 INJECTION, SOLUTION INTRAVENOUS at 03:09

## 2019-09-27 RX ADMIN — NIFEDIPINE 30 MG: 30 TABLET, FILM COATED, EXTENDED RELEASE ORAL at 09:09

## 2019-09-27 RX ADMIN — FUROSEMIDE 160 MG: 10 INJECTION, SOLUTION INTRAVENOUS at 05:09

## 2019-09-27 RX ADMIN — ACETAMINOPHEN 650 MG: 325 TABLET ORAL at 09:09

## 2019-09-27 RX ADMIN — ASPIRIN 81 MG: 81 TABLET, COATED ORAL at 09:09

## 2019-09-27 RX ADMIN — HEPARIN SODIUM 5000 UNITS: 5000 INJECTION, SOLUTION INTRAVENOUS; SUBCUTANEOUS at 09:09

## 2019-09-27 RX ADMIN — HEPARIN SODIUM 5000 UNITS: 5000 INJECTION, SOLUTION INTRAVENOUS; SUBCUTANEOUS at 05:09

## 2019-09-27 RX ADMIN — INSULIN ASPART 4 UNITS: 100 INJECTION, SOLUTION INTRAVENOUS; SUBCUTANEOUS at 05:09

## 2019-09-27 RX ADMIN — ERGOCALCIFEROL 50000 UNITS: 1.25 CAPSULE ORAL at 09:09

## 2019-09-27 RX ADMIN — FUROSEMIDE 160 MG: 10 INJECTION, SOLUTION INTRAVENOUS at 10:09

## 2019-09-27 RX ADMIN — Medication 10 ML: at 12:09

## 2019-09-27 RX ADMIN — Medication 10 ML: at 05:09

## 2019-09-27 RX ADMIN — CARVEDILOL 12.5 MG: 12.5 TABLET, FILM COATED ORAL at 05:09

## 2019-09-27 RX ADMIN — FLUTICASONE FUROATE AND VILANTEROL TRIFENATATE 1 PUFF: 100; 25 POWDER RESPIRATORY (INHALATION) at 07:09

## 2019-09-27 RX ADMIN — CARVEDILOL 12.5 MG: 12.5 TABLET, FILM COATED ORAL at 09:09

## 2019-09-27 RX ADMIN — METOLAZONE 10 MG: 5 TABLET ORAL at 09:09

## 2019-09-27 RX ADMIN — INSULIN ASPART 2 UNITS: 100 INJECTION, SOLUTION INTRAVENOUS; SUBCUTANEOUS at 12:09

## 2019-09-27 RX ADMIN — HEPARIN SODIUM 5000 UNITS: 5000 INJECTION, SOLUTION INTRAVENOUS; SUBCUTANEOUS at 01:09

## 2019-09-27 NOTE — PLAN OF CARE
Patient on oxygen with documented flow. Will attempt to wean per protocol.  Will continue to monitor.

## 2019-09-27 NOTE — PLAN OF CARE
IV Dopamine at 5.9 per MD orders. Dickson care performed. Patient up from chair to bed; noted short of breath on exertion. No complaints of pain. Safety maintained.

## 2019-09-27 NOTE — PROGRESS NOTES
[facility]/General Surgery  Progress Note    Admit Date: 9/20/2019  Hospital Day: 7  Procedure: 4 Days Post-Op s/p EUS with biopsy    Subjective:  NAEO. AFVSS.   Intermittent HTN.  Tolerating diet, no nausea.   No chest pain. +SOB. On 6L O2 NC.   Sitting up in the chair, significant sob over night, not able to sleep in bed.  On renal dopa. On spironolactone.  Lasix given for diuresis yesterday, good UOP.  No nausea or emesis. Reports minimal appetite.     Per GI- EUS done. Many malignant appearing lymph nodes visualized in celiac region, peripancreatic region, richard hepatis. Hypoechoic. Fine needle biopsy performed. Core needle biopsy performed.  -one stent visualized endosonographically in CBD  -cystic lesions in the pancreatic body and tail    Per cards- increased coreg to 12.5 BID, official ECHO. Pt is at moderate risk for moderate risk procedure    Per renal- diuresis yesterday, UOP improved    Physical Exam:  Gen: no acute distress. Alert and oriented x3.  HEENT: normocephalic and atraumatic. EOMI. MMM.  Resp: unlabored respirations, intermittent tachypnia, on O2 NC  CV: regular rate, distal pulses intact  Abd: soft, obese, no tenderness or distention  Ext: warm and well perfused, mild swelling  MSK: strength grossly intact  Neuro: no focal deficits, normal sensation    Labs reviewed  H/H stable  WBC 9  K 3.6  Cr 2.2    I/O (last 24 hours): -1638  UOP: 2625    Assessment/Plan: 79 y.o. 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  She has a pertinent past medical history of congestive heart failure which currently appears to be decompensated in terms of her current symptoms of shortness of breath and increased minimal lower extremity edema.   Pt will need contrast enhanced CT versus MRCP when able to give contrast, given kidney function, and FDG PET.    -s/p EUS   -SSI  for DM  -replace electrolytes IV via PICC  -pain control, anti-emetics as needed  -Renal consulted for evaluation and recommendation for diuresis and optimization for possible contrast study  -will give lasix for diuresis  -on renal dopa  -SCDs for ppx    Diet- renal  Antibiotics- none  Prophylaxis- SCDs     Jannie Acosta MD   [facility] General Surgery, PGY2  [phone]  09/27/2019     Inpatient Data     Vitals:   Temp:  [97.9 °F (36.6 °C)-98.6 °F (37 °C)] 98 °F (36.7 °C)  Pulse:  [70-86] 86  Resp:  [18-20] 20  SpO2:  [97 %-98 %] 98 %  BP: (125-149)/(59-63) 149/63 Intake/Output:  09/26 0701 - 09/27 0700  In: 787 [P.O.:640; I.V.:147]  Out: 2225 [Urine:2225]       Recent Labs     09/25/19  0458 09/26/19  0455 09/27/19  0420   WBC 8.58 8.24 9.19   HGB 8.8* 8.9* 8.8*   HCT 27.5* 27.4* 27.2*    284 298    136 138   K 3.8 3.9 3.6    101 101   CO2 25 25 25   BUN 48* 56* 57*   CREATININE 2.4* 2.3* 2.2*   BILITOT 0.5 0.4 0.5   AST 15 16 20   ALT 9* 8* 10   ALKPHOS 160* 171* 186*   CALCIUM 9.5 9.5 9.6   ALBUMIN 2.7* 2.8* 2.8*   PROT 7.8 8.0 7.9   MG 1.9 1.7 1.6   PHOS 3.4 3.2  3.2 3.0  3.0        Scheduled Meds:   aspirin  81 mg Oral Daily    carvedilol  12.5 mg Oral BID WM    ergocalciferol  50,000 Units Oral Q7 Days    fluticasone furoate-vilanterol  1 puff Inhalation Daily    furosemide  160 mg Intravenous BID    heparin (porcine)  5,000 Units Subcutaneous Q8H    metOLazone  10 mg Oral Daily    NIFEdipine  30 mg Oral Daily    sodium chloride 0.9%  10 mL Intravenous Q6H     Continuous Infusions:   DOPamine 2 mcg/kg/min (09/27/19 0343)     PRN Meds:acetaminophen, Dextrose 10% Bolus, Dextrose 10% Bolus, dextrose 50 % in water (D50W), dextrose 50 % in water (D50W), glucagon (human recombinant), glucose, glucose, HYDROcodone-acetaminophen, insulin aspart U-100, ondansetron, Flushing PICC Protocol **AND** sodium chloride 0.9% **AND** sodium chloride 0.9%

## 2019-09-27 NOTE — PROGRESS NOTES
[facility] Consult Note    Date of Admit: 2019      Subjective:   Shortness of breath improving. Good uop  Objective:   Last 24 Hour Vital Signs:  BP  Min: 125/60  Max: 149/63  Temp  Av.3 °F (36.8 °C)  Min: 97.9 °F (36.6 °C)  Max: 98.7 °F (37.1 °C)  Pulse  Av.4  Min: 70  Max: 86  Resp  Av.7  Min: 15  Max: 20  SpO2  Av.5 %  Min: 97 %  Max: 98 %  Weight  Av.7 kg (182 lb 5.1 oz)  Min: 82.7 kg (182 lb 5.1 oz)  Max: 82.7 kg (182 lb 5.1 oz)  Body mass index is 30.34 kg/m².  I/O last 3 completed shifts:  In: 987 [P.O.:840; I.V.:147]  Out: 2625 [Urine:2625]    Physical Examination:  Gen: NAD, AAOx3, sitting up in bed  HEENT: NCAT, EOMI  Cardio: RRR, normal S1/S2, no MRG, ext jugular distended, JVP difficult to visualize  Lungs: on  on 3L NS, bibasilar crackles  Abd: soft non tender, non distended, normal bowel sounds, no hepatosplenomegaly  Ext: no edema b/l LE  Skin: warm, dry, no rashes noted  Neuro: moves all ext, sensation intact  Psych: conversational, responsive      Laboratory:  Most Recent Data:  CBC:   Lab Results   Component Value Date    WBC 9.19 2019    HGB 8.8 (L) 2019    HCT 27.2 (L) 2019     2019    MCV 81 (L) 2019    RDW 15.7 (H) 2019     BMP:   Lab Results   Component Value Date     2019    K 3.6 2019     2019    CO2 25 2019    BUN 57 (H) 2019    CREATININE 2.2 (H) 2019     (H) 2019    CALCIUM 9.6 2019    MG 1.6 2019    PHOS 3.0 2019    PHOS 3.0 2019     LFTs:   Lab Results   Component Value Date    PROT 7.9 2019    ALBUMIN 2.8 (L) 2019    BILITOT 0.5 2019    AST 20 2019    ALKPHOS 186 (H) 2019    ALT 10 2019     Coags: No results found for: INR, PROTIME, PTT  Urinalysis:   Lab Results   Component Value Date    LABURIN  2019     Multiple organisms isolated. None in predominance.  Repeat if    LABURIN  clinically necessary. 09/21/2019    COLORU Yellow 09/21/2019    SPECGRAV 1.010 09/21/2019    NITRITE Negative 09/21/2019    KETONESU Negative 09/21/2019    UROBILINOGEN Negative 09/21/2019    WBCUA 60 (H) 09/21/2019       Trended Lab Data:  Recent Labs   Lab 09/25/19  0458 09/26/19  0455 09/27/19  0420   WBC 8.58 8.24 9.19   HGB 8.8* 8.9* 8.8*   HCT 27.5* 27.4* 27.2*    284 298   MCV 82 81* 81*   RDW 15.6* 15.6* 15.7*    136 138   K 3.8 3.9 3.6    101 101   CO2 25 25 25   BUN 48* 56* 57*   CREATININE 2.4* 2.3* 2.2*   * 208* 135*   PROT 7.8 8.0 7.9   ALBUMIN 2.7* 2.8* 2.8*   BILITOT 0.5 0.4 0.5   AST 15 16 20   ALKPHOS 160* 171* 186*   ALT 9* 8* 10       Trended Cardiac Data:  Recent Labs   Lab 09/21/19  0522 09/24/19  1530   * 557*          Assessment and Plan:      Nidia Martel is a 79 y.o.female with  Patient Active Problem List    Diagnosis Date Noted    Secondary hyperparathyroidism of renal origin 09/26/2019    Diabetic nephropathy 09/26/2019    Malignant neoplasm of body of pancreas 09/25/2019    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    Malnutrition compromising bodily function 09/22/2019    Renal insufficiency 09/21/2019    Type 2 diabetes mellitus with chronic kidney disease, with long-term current use of insulin 09/21/2019    Obesity due to excess calories with serious comorbidity 09/21/2019    Hypertension due to endocrine disorder 09/21/2019    Pancreatic mass 09/20/2019    Congestive heart failure 09/20/2019      Volume overload in CKD 4  -Presents with shortness of breath, evidence of volume overload, poor response to lasix thus far  -UA with 1+ proteinuria, BNP elevated 557, CXR with cardiomegaly and interstial markings  --2.2L UOP yesterday, Cont lasix 160 bid with metolazone 30 min before lasix.     CKD 4  -Follows with Dr. Josh Henderson in [facility], [place]  -Baseline Cr 2.2  -Cr near B/L, renal dose meds    HTN  -Mildly above  goal, dopamine may be contributing, held aldactone and hold ARB for now, can add amlodipine if needed though will monitor    Normocytic anemia  -Fe def, would add PO Fe as she is on it at home,  Hgb stable. No evidence of bleeding now    MBD  -Add ergo, PTH within goal     Prediabetes  -Needs counseling    Fernando Jorgensen  [facility]

## 2019-09-27 NOTE — TELEPHONE ENCOUNTER
----- Message from Leigh Paniagua RN sent at 9/27/2019  9:17 AM CDT -----  Dr Antony,     I will be watching her labs, especially kidney function. Please let me know when she is coming in to see you.    Thanks,   April  ----- Message -----  From: Rangel Antony DO, FACP  Sent: 9/26/2019   9:30 PM CDT  To: NALLELY Miramontes MD, Leigh Paniagua RN, #    Thanks.  She may be a candidate for a new Civicon trial using platinum, etoposide, pembrolizumab.  I do worry about her renal function.  I am not sure if this is her baseline or an acute kidney injury.  I will not be in [place] until Tuesday but maybe we can set her up in clinic next week.  ----- Message -----  From: NALLELY Miramontes MD  Sent: 9/26/2019   5:27 PM CDT  To: Rangel Antony DO, FACP, #    Poorly diff ki ki 67  90%  Steven this wants for you.  Do you want to see her while she is still in the hospital?   Does she needed a port for  Chemo, creatinine runs about 2.5

## 2019-09-28 LAB
ALBUMIN SERPL BCP-MCNC: 2.7 G/DL (ref 3.5–5.2)
ALP SERPL-CCNC: 219 U/L (ref 55–135)
ALT SERPL W/O P-5'-P-CCNC: 18 U/L (ref 10–44)
ANION GAP SERPL CALC-SCNC: 13 MMOL/L (ref 8–16)
AST SERPL-CCNC: 34 U/L (ref 10–40)
BASOPHILS # BLD AUTO: 0.01 K/UL (ref 0–0.2)
BASOPHILS NFR BLD: 0.1 % (ref 0–1.9)
BILIRUB SERPL-MCNC: 0.6 MG/DL (ref 0.1–1)
BUN SERPL-MCNC: 58 MG/DL (ref 8–23)
CALCIUM SERPL-MCNC: 9.8 MG/DL (ref 8.7–10.5)
CHLORIDE SERPL-SCNC: 97 MMOL/L (ref 95–110)
CO2 SERPL-SCNC: 27 MMOL/L (ref 23–29)
CREAT SERPL-MCNC: 2 MG/DL (ref 0.5–1.4)
DIFFERENTIAL METHOD: ABNORMAL
EOSINOPHIL # BLD AUTO: 0.3 K/UL (ref 0–0.5)
EOSINOPHIL NFR BLD: 2.6 % (ref 0–8)
ERYTHROCYTE [DISTWIDTH] IN BLOOD BY AUTOMATED COUNT: 15.6 % (ref 11.5–14.5)
EST. GFR  (AFRICAN AMERICAN): 27 ML/MIN/1.73 M^2
EST. GFR  (NON AFRICAN AMERICAN): 23 ML/MIN/1.73 M^2
GLUCOSE SERPL-MCNC: 125 MG/DL (ref 70–110)
HCT VFR BLD AUTO: 26.8 % (ref 37–48.5)
HGB BLD-MCNC: 8.8 G/DL (ref 12–16)
LYMPHOCYTES # BLD AUTO: 1.9 K/UL (ref 1–4.8)
LYMPHOCYTES NFR BLD: 20.2 % (ref 18–48)
MAGNESIUM SERPL-MCNC: 1.6 MG/DL (ref 1.6–2.6)
MCH RBC QN AUTO: 26.3 PG (ref 27–31)
MCHC RBC AUTO-ENTMCNC: 32.8 G/DL (ref 32–36)
MCV RBC AUTO: 80 FL (ref 82–98)
MONOCYTES # BLD AUTO: 0.7 K/UL (ref 0.3–1)
MONOCYTES NFR BLD: 7.6 % (ref 4–15)
NEUTROPHILS # BLD AUTO: 6.5 K/UL (ref 1.8–7.7)
NEUTROPHILS NFR BLD: 69.5 % (ref 38–73)
PHOSPHATE SERPL-MCNC: 3.1 MG/DL (ref 2.7–4.5)
PHOSPHATE SERPL-MCNC: 3.1 MG/DL (ref 2.7–4.5)
PLATELET # BLD AUTO: 283 K/UL (ref 150–350)
PMV BLD AUTO: 10.2 FL (ref 9.2–12.9)
POCT GLUCOSE: 129 MG/DL (ref 70–110)
POCT GLUCOSE: 167 MG/DL (ref 70–110)
POCT GLUCOSE: 191 MG/DL (ref 70–110)
POCT GLUCOSE: 218 MG/DL (ref 70–110)
POTASSIUM SERPL-SCNC: 3.3 MMOL/L (ref 3.5–5.1)
PROT SERPL-MCNC: 7.7 G/DL (ref 6–8.4)
RBC # BLD AUTO: 3.34 M/UL (ref 4–5.4)
SODIUM SERPL-SCNC: 137 MMOL/L (ref 136–145)
WBC # BLD AUTO: 9.46 K/UL (ref 3.9–12.7)

## 2019-09-28 PROCEDURE — 25000003 PHARM REV CODE 250: Performed by: STUDENT IN AN ORGANIZED HEALTH CARE EDUCATION/TRAINING PROGRAM

## 2019-09-28 PROCEDURE — 99900035 HC TECH TIME PER 15 MIN (STAT)

## 2019-09-28 PROCEDURE — 27000221 HC OXYGEN, UP TO 24 HOURS

## 2019-09-28 PROCEDURE — 94799 UNLISTED PULMONARY SVC/PX: CPT

## 2019-09-28 PROCEDURE — 80053 COMPREHEN METABOLIC PANEL: CPT

## 2019-09-28 PROCEDURE — 85025 COMPLETE CBC W/AUTO DIFF WBC: CPT

## 2019-09-28 PROCEDURE — 63600175 PHARM REV CODE 636 W HCPCS: Performed by: STUDENT IN AN ORGANIZED HEALTH CARE EDUCATION/TRAINING PROGRAM

## 2019-09-28 PROCEDURE — 84100 ASSAY OF PHOSPHORUS: CPT

## 2019-09-28 PROCEDURE — 94664 DEMO&/EVAL PT USE INHALER: CPT

## 2019-09-28 PROCEDURE — 94640 AIRWAY INHALATION TREATMENT: CPT

## 2019-09-28 PROCEDURE — 94761 N-INVAS EAR/PLS OXIMETRY MLT: CPT

## 2019-09-28 PROCEDURE — 11000001 HC ACUTE MED/SURG PRIVATE ROOM

## 2019-09-28 PROCEDURE — A4216 STERILE WATER/SALINE, 10 ML: HCPCS | Performed by: STUDENT IN AN ORGANIZED HEALTH CARE EDUCATION/TRAINING PROGRAM

## 2019-09-28 PROCEDURE — 83735 ASSAY OF MAGNESIUM: CPT

## 2019-09-28 RX ORDER — POTASSIUM CHLORIDE 20 MEQ/1
60 TABLET, EXTENDED RELEASE ORAL ONCE
Status: COMPLETED | OUTPATIENT
Start: 2019-09-28 | End: 2019-09-28

## 2019-09-28 RX ORDER — DOPAMINE HCL IN DEXTROSE 5 % 400MG/.25L
2 INFUSION BOTTLE (ML) INTRAVENOUS CONTINUOUS
Status: DISCONTINUED | OUTPATIENT
Start: 2019-09-28 | End: 2019-09-29

## 2019-09-28 RX ADMIN — NIFEDIPINE 30 MG: 30 TABLET, FILM COATED, EXTENDED RELEASE ORAL at 10:09

## 2019-09-28 RX ADMIN — DOPAMINE HYDROCHLORIDE 2 MCG/KG/MIN: 160 INJECTION, SOLUTION INTRAVENOUS at 10:09

## 2019-09-28 RX ADMIN — CARVEDILOL 12.5 MG: 12.5 TABLET, FILM COATED ORAL at 08:09

## 2019-09-28 RX ADMIN — HEPARIN SODIUM 5000 UNITS: 5000 INJECTION, SOLUTION INTRAVENOUS; SUBCUTANEOUS at 06:09

## 2019-09-28 RX ADMIN — INSULIN ASPART 4 UNITS: 100 INJECTION, SOLUTION INTRAVENOUS; SUBCUTANEOUS at 12:09

## 2019-09-28 RX ADMIN — Medication 10 ML: at 06:09

## 2019-09-28 RX ADMIN — Medication 10 ML: at 12:09

## 2019-09-28 RX ADMIN — HEPARIN SODIUM 5000 UNITS: 5000 INJECTION, SOLUTION INTRAVENOUS; SUBCUTANEOUS at 09:09

## 2019-09-28 RX ADMIN — FUROSEMIDE 160 MG: 10 INJECTION, SOLUTION INTRAVENOUS at 11:09

## 2019-09-28 RX ADMIN — ASPIRIN 81 MG: 81 TABLET, COATED ORAL at 10:09

## 2019-09-28 RX ADMIN — HEPARIN SODIUM 5000 UNITS: 5000 INJECTION, SOLUTION INTRAVENOUS; SUBCUTANEOUS at 03:09

## 2019-09-28 RX ADMIN — ACETAMINOPHEN 650 MG: 325 TABLET ORAL at 06:09

## 2019-09-28 RX ADMIN — POTASSIUM CHLORIDE 60 MEQ: 20 TABLET, EXTENDED RELEASE ORAL at 12:09

## 2019-09-28 RX ADMIN — FUROSEMIDE 160 MG: 10 INJECTION, SOLUTION INTRAVENOUS at 07:09

## 2019-09-28 RX ADMIN — CARVEDILOL 12.5 MG: 12.5 TABLET, FILM COATED ORAL at 06:09

## 2019-09-28 RX ADMIN — INSULIN ASPART 2 UNITS: 100 INJECTION, SOLUTION INTRAVENOUS; SUBCUTANEOUS at 06:09

## 2019-09-28 RX ADMIN — ACETAMINOPHEN 650 MG: 325 TABLET ORAL at 09:09

## 2019-09-28 RX ADMIN — INSULIN ASPART 1 UNITS: 100 INJECTION, SOLUTION INTRAVENOUS; SUBCUTANEOUS at 10:09

## 2019-09-28 RX ADMIN — FLUTICASONE FUROATE AND VILANTEROL TRIFENATATE 1 PUFF: 100; 25 POWDER RESPIRATORY (INHALATION) at 08:09

## 2019-09-28 RX ADMIN — METOLAZONE 10 MG: 5 TABLET ORAL at 10:09

## 2019-09-28 NOTE — PLAN OF CARE
Patient on oxygen. No Distress Noted. Will continue to monitor. Breo given. The proper method of use, as well as anticipated side effects, of this metered-dose inhaler are discussed and demonstrated to the patient. Aerobika and Incentive Spirometry performed.

## 2019-09-28 NOTE — NURSING
VN contacted nephro on call regarding pt's castillo. Nephro said OK to dc'ing castillo, but advises that team keep accurate I&Os. Floor nurse notified. New order noted TO/RBV.

## 2019-09-28 NOTE — PROGRESS NOTES
[facility] Consult Note    Date of Admit: 2019      Subjective:   Feeling better today. SOB continues to improve      Objective:   Last 24 Hour Vital Signs:  BP  Min: 115/57  Max: 150/65  Temp  Av.6 °F (37 °C)  Min: 98 °F (36.7 °C)  Max: 99.3 °F (37.4 °C)  Pulse  Av.1  Min: 68  Max: 81  Resp  Av.6  Min: 18  Max: 20  SpO2  Av.7 %  Min: 98 %  Max: 99 %  Body mass index is 30.34 kg/m².  I/O last 3 completed shifts:  In: 451.2 [P.O.:315; I.V.:136.2]  Out: 5127 [Urine:5127]    Physical Examination:  Gen: NAD, AAOx3, sitting up in bed  HEENT: NCAT, EOMI  Cardio: RRR, normal S1/S2, no MRG, ext jugular distended, JVP difficult to visualize  Lungs: on  on 3L NS, bibasilar crackles  Abd: soft non tender, non distended, normal bowel sounds, no hepatosplenomegaly  Ext: no edema b/l LE  Skin: warm, dry, no rashes noted  Neuro: moves all ext, sensation intact  Psych: conversational, responsive      Laboratory:  Most Recent Data:  CBC:   Lab Results   Component Value Date    WBC 9.46 2019    HGB 8.8 (L) 2019    HCT 26.8 (L) 2019     2019    MCV 80 (L) 2019    RDW 15.6 (H) 2019     BMP:   Lab Results   Component Value Date     2019    K 3.3 (L) 2019    CL 97 2019    CO2 27 2019    BUN 58 (H) 2019    CREATININE 2.0 (H) 2019     (H) 2019    CALCIUM 9.8 2019    MG 1.6 2019    PHOS 3.1 2019    PHOS 3.1 2019     LFTs:   Lab Results   Component Value Date    PROT 7.7 2019    ALBUMIN 2.7 (L) 2019    BILITOT 0.6 2019    AST 34 2019    ALKPHOS 219 (H) 2019    ALT 18 2019     Coags: No results found for: INR, PROTIME, PTT  Urinalysis:   Lab Results   Component Value Date    LABURIN  2019     Multiple organisms isolated. None in predominance.  Repeat if    LABURIN clinically necessary. 2019    COLORU Yellow 2019    SPECGRAV 1.010 2019     NITRITE Negative 09/21/2019    KETONESU Negative 09/21/2019    UROBILINOGEN Negative 09/21/2019    WBCUA 60 (H) 09/21/2019       Trended Lab Data:  Recent Labs   Lab 09/26/19  0455 09/27/19  0420 09/28/19  0519   WBC 8.24 9.19 9.46   HGB 8.9* 8.8* 8.8*   HCT 27.4* 27.2* 26.8*    298 283   MCV 81* 81* 80*   RDW 15.6* 15.7* 15.6*    138 137   K 3.9 3.6 3.3*    101 97   CO2 25 25 27   BUN 56* 57* 58*   CREATININE 2.3* 2.2* 2.0*   * 135* 125*   PROT 8.0 7.9 7.7   ALBUMIN 2.8* 2.8* 2.7*   BILITOT 0.4 0.5 0.6   AST 16 20 34   ALKPHOS 171* 186* 219*   ALT 8* 10 18       Trended Cardiac Data:  Recent Labs   Lab 09/24/19  1530   *          Assessment and Plan:      Nidia Martel is a 79 y.o.female with  Patient Active Problem List    Diagnosis Date Noted    Secondary hyperparathyroidism of renal origin 09/26/2019    Diabetic nephropathy 09/26/2019    Malignant neoplasm of body of pancreas 09/25/2019    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    Malnutrition compromising bodily function 09/22/2019    Renal insufficiency 09/21/2019    Type 2 diabetes mellitus with chronic kidney disease, with long-term current use of insulin 09/21/2019    Obesity due to excess calories with serious comorbidity 09/21/2019    Hypertension due to endocrine disorder 09/21/2019    Pancreatic mass 09/20/2019    Congestive heart failure 09/20/2019      Volume overload in CKD 4  -Presents with shortness of breath, evidence of volume overload, poor response to lasix   -UA with 1+ proteinuria, BNP elevated 557, CXR with cardiomegaly and interstial markings  --4L UOP yesterday, Cont lasix 160 bid with metolazone 30 min before lasix.     CKD 4  -Follows with Dr. Josh Henderson in [facility], [place]  -Baseline Cr 2.2  -Cr near B/L, renal dose meds      HTN  -continue same management    Normocytic anemia  -Fe def, would add PO Fe as she is on it at home,  Hgb stable. No evidence of  bleeding now    MBD  -Add ergo, PTH within goal     Prediabetes  -Needs counseling    Plan discussed with nephrology team attending Dr Margie aguilera MD  \[facility]\"" Nephrology Fellow

## 2019-09-28 NOTE — PLAN OF CARE
Progress notes reviewed. Rounds completed. Introduced self as VN for this shift. Educated pt on VN's role in pt care. Educated pt about VTE and fall precautions.  Opportunity given for pt's questions. No questions or concerns expressed at this time. CLABSI and CAUTI education provided.     09/28/19 1115   Type of Frequent Check   Type Other (see comments)  (vn rounds)   Safety/Activity   Patient Rounds visualized patient   Safety Promotion/Fall Prevention Fall Risk reviewed with patient/family;family to remain at bedside;instructed to call staff for mobility   Positioning   Body Position side-lying, right   Head of Bed (HOB) HOB at 20-30 degrees   Pain/Comfort/Sleep   Pain Rating: Rest 0 - no pain

## 2019-09-28 NOTE — PROGRESS NOTES
[facility]/General Surgery  Progress Note    Admit Date: 9/20/2019  Hospital Day: 8  Procedure: 5 Days Post-Op s/p EUS with biopsy    Subjective:  NAEO. AFVSS.   Intermittent HTN.  Tolerating diet, no nausea.   No chest pain. +SOB. On 6L O2 NC.   Sitting up in the chair  On renal dopa.  diuresis yesterday, good UOP.  No nausea or emesis. Reports minimal appetite.     Per GI- EUS done. Many malignant appearing lymph nodes visualized in celiac region, peripancreatic region, richard hepatis. Hypoechoic. Fine needle biopsy performed. Core needle biopsy performed.  -one stent visualized endosonographically in CBD  -cystic lesions in the pancreatic body and tail    Physical Exam:  Gen: no acute distress. Alert and oriented x3.  HEENT: normocephalic and atraumatic. EOMI. MMM.  Resp: unlabored respirations, intermittent tachypnia, on O2 NC  CV: regular rate, distal pulses intact  Abd: soft, obese, no tenderness or distention  Ext: warm and well perfused, mild swelling  MSK: strength grossly intact  Neuro: no focal deficits, normal sensation    Labs reviewed  H/H stable  WBC 9  K 3.6  Cr 2.2    I/O (last 24 hours): -3890  UOP: 4027 MD    Assessment/Plan: 79 y.o. 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  She has a pertinent past medical history of congestive heart failure which currently appears to be decompensated in terms of her current symptoms of shortness of breath and increased minimal lower extremity edema.   Pt will need contrast enhanced CT versus MRCP when able to give contrast, given kidney function, and FDG PET.    -s/p EUS   -SSI for DM  -replace electrolytes IV via PICC  -pain control, anti-emetics as needed  -Renal consulted for evaluation and recommendation for diuresis and optimization for possible contrast study  -diuresis  -on renal dopa  -SCDs for ppx    Diet-  renal  Antibiotics- none  Prophylaxis- SCDs     Gomez Baker MD   [facility] Surgery, PGY4  [phone]  09/28/2019     Inpatient Data     Vitals:   Temp:  [97.9 °F (36.6 °C)-99.3 °F (37.4 °C)] 98.7 °F (37.1 °C)  Pulse:  [68-82] 75  Resp:  [18-20] 20  SpO2:  [96 %-99 %] 99 %  BP: (115-150)/(57-65) 132/63 Intake/Output:  09/27 0701 - 09/28 0700  In: 136.2 [I.V.:136.2]  Out: 4027 [Urine:4027]       Recent Labs     09/26/19  0455 09/27/19  0420 09/28/19  0519   WBC 8.24 9.19 9.46   HGB 8.9* 8.8* 8.8*   HCT 27.4* 27.2* 26.8*    298 283    138 137   K 3.9 3.6 3.3*    101 97   CO2 25 25 27   BUN 56* 57* 58*   CREATININE 2.3* 2.2* 2.0*   BILITOT 0.4 0.5 0.6   AST 16 20 34   ALT 8* 10 18   ALKPHOS 171* 186* 219*   CALCIUM 9.5 9.6 9.8   ALBUMIN 2.8* 2.8* 2.7*   PROT 8.0 7.9 7.7   MG 1.7 1.6 1.6   PHOS 3.2  3.2 3.0  3.0 3.1  3.1        Scheduled Meds:   aspirin  81 mg Oral Daily    carvedilol  12.5 mg Oral BID WM    ergocalciferol  50,000 Units Oral Q7 Days    fluticasone furoate-vilanterol  1 puff Inhalation Daily    furosemide  160 mg Intravenous BID    heparin (porcine)  5,000 Units Subcutaneous Q8H    metOLazone  10 mg Oral Daily    NIFEdipine  30 mg Oral Daily    sodium chloride 0.9%  10 mL Intravenous Q6H     Continuous Infusions:   DOPamine 2 mcg/kg/min (09/28/19 1245)     PRN Meds:acetaminophen, Dextrose 10% Bolus, Dextrose 10% Bolus, dextrose 50 % in water (D50W), dextrose 50 % in water (D50W), glucagon (human recombinant), glucose, glucose, HYDROcodone-acetaminophen, insulin aspart U-100, ondansetron, Flushing PICC Protocol **AND** sodium chloride 0.9% **AND** sodium chloride 0.9%

## 2019-09-28 NOTE — PLAN OF CARE
Patient awake and alert. Scheduled medications administered per MD order. Complains of 8/10 pain. Prn tylenol administered. Denies any nausea. Dickson catheter patent. 3L NC. Will continue to monitor.

## 2019-09-29 LAB
ALBUMIN SERPL BCP-MCNC: 2.8 G/DL (ref 3.5–5.2)
ALP SERPL-CCNC: 228 U/L (ref 55–135)
ALT SERPL W/O P-5'-P-CCNC: 27 U/L (ref 10–44)
ANION GAP SERPL CALC-SCNC: 14 MMOL/L (ref 8–16)
AST SERPL-CCNC: 47 U/L (ref 10–40)
BILIRUB SERPL-MCNC: 0.6 MG/DL (ref 0.1–1)
BNP SERPL-MCNC: 90 PG/ML (ref 0–99)
BUN SERPL-MCNC: 57 MG/DL (ref 8–23)
CALCIUM SERPL-MCNC: 9.9 MG/DL (ref 8.7–10.5)
CHLORIDE SERPL-SCNC: 95 MMOL/L (ref 95–110)
CO2 SERPL-SCNC: 28 MMOL/L (ref 23–29)
CREAT SERPL-MCNC: 1.9 MG/DL (ref 0.5–1.4)
EST. GFR  (AFRICAN AMERICAN): 28 ML/MIN/1.73 M^2
EST. GFR  (NON AFRICAN AMERICAN): 25 ML/MIN/1.73 M^2
GLUCOSE SERPL-MCNC: 157 MG/DL (ref 70–110)
MAGNESIUM SERPL-MCNC: 1.4 MG/DL (ref 1.6–2.6)
PHOSPHATE SERPL-MCNC: 2.5 MG/DL (ref 2.7–4.5)
POCT GLUCOSE: 150 MG/DL (ref 70–110)
POCT GLUCOSE: 194 MG/DL (ref 70–110)
POCT GLUCOSE: 195 MG/DL (ref 70–110)
POCT GLUCOSE: 332 MG/DL (ref 70–110)
POTASSIUM SERPL-SCNC: 3.9 MMOL/L (ref 3.5–5.1)
PROT SERPL-MCNC: 7.9 G/DL (ref 6–8.4)
SODIUM SERPL-SCNC: 137 MMOL/L (ref 136–145)

## 2019-09-29 PROCEDURE — 99900035 HC TECH TIME PER 15 MIN (STAT)

## 2019-09-29 PROCEDURE — 11000001 HC ACUTE MED/SURG PRIVATE ROOM

## 2019-09-29 PROCEDURE — 83735 ASSAY OF MAGNESIUM: CPT

## 2019-09-29 PROCEDURE — 25000003 PHARM REV CODE 250: Performed by: STUDENT IN AN ORGANIZED HEALTH CARE EDUCATION/TRAINING PROGRAM

## 2019-09-29 PROCEDURE — 27000646 HC AEROBIKA DEVICE

## 2019-09-29 PROCEDURE — 63600175 PHARM REV CODE 636 W HCPCS: Performed by: STUDENT IN AN ORGANIZED HEALTH CARE EDUCATION/TRAINING PROGRAM

## 2019-09-29 PROCEDURE — 80053 COMPREHEN METABOLIC PANEL: CPT

## 2019-09-29 PROCEDURE — 84100 ASSAY OF PHOSPHORUS: CPT

## 2019-09-29 PROCEDURE — 94761 N-INVAS EAR/PLS OXIMETRY MLT: CPT

## 2019-09-29 PROCEDURE — 94664 DEMO&/EVAL PT USE INHALER: CPT

## 2019-09-29 PROCEDURE — 94640 AIRWAY INHALATION TREATMENT: CPT

## 2019-09-29 PROCEDURE — A4216 STERILE WATER/SALINE, 10 ML: HCPCS | Performed by: STUDENT IN AN ORGANIZED HEALTH CARE EDUCATION/TRAINING PROGRAM

## 2019-09-29 PROCEDURE — 83880 ASSAY OF NATRIURETIC PEPTIDE: CPT

## 2019-09-29 PROCEDURE — 94799 UNLISTED PULMONARY SVC/PX: CPT

## 2019-09-29 RX ORDER — MAGNESIUM SULFATE HEPTAHYDRATE 40 MG/ML
2 INJECTION, SOLUTION INTRAVENOUS
Status: COMPLETED | OUTPATIENT
Start: 2019-09-29 | End: 2019-09-29

## 2019-09-29 RX ADMIN — Medication 10 ML: at 05:09

## 2019-09-29 RX ADMIN — HEPARIN SODIUM 5000 UNITS: 5000 INJECTION, SOLUTION INTRAVENOUS; SUBCUTANEOUS at 01:09

## 2019-09-29 RX ADMIN — SODIUM PHOSPHATE, MONOBASIC, MONOHYDRATE 20.01 MMOL: 276; 142 INJECTION, SOLUTION INTRAVENOUS at 02:09

## 2019-09-29 RX ADMIN — CARVEDILOL 12.5 MG: 12.5 TABLET, FILM COATED ORAL at 04:09

## 2019-09-29 RX ADMIN — CARVEDILOL 12.5 MG: 12.5 TABLET, FILM COATED ORAL at 07:09

## 2019-09-29 RX ADMIN — FUROSEMIDE 160 MG: 10 INJECTION, SOLUTION INTRAVENOUS at 08:09

## 2019-09-29 RX ADMIN — HEPARIN SODIUM 5000 UNITS: 5000 INJECTION, SOLUTION INTRAVENOUS; SUBCUTANEOUS at 10:09

## 2019-09-29 RX ADMIN — FLUTICASONE FUROATE AND VILANTEROL TRIFENATATE 1 PUFF: 100; 25 POWDER RESPIRATORY (INHALATION) at 08:09

## 2019-09-29 RX ADMIN — METOLAZONE 10 MG: 5 TABLET ORAL at 08:09

## 2019-09-29 RX ADMIN — Medication 10 ML: at 12:09

## 2019-09-29 RX ADMIN — MAGNESIUM SULFATE IN WATER 2 G: 40 INJECTION, SOLUTION INTRAVENOUS at 02:09

## 2019-09-29 RX ADMIN — HEPARIN SODIUM 5000 UNITS: 5000 INJECTION, SOLUTION INTRAVENOUS; SUBCUTANEOUS at 05:09

## 2019-09-29 RX ADMIN — ASPIRIN 81 MG: 81 TABLET, COATED ORAL at 08:09

## 2019-09-29 RX ADMIN — Medication 10 ML: at 11:09

## 2019-09-29 RX ADMIN — NIFEDIPINE 30 MG: 30 TABLET, FILM COATED, EXTENDED RELEASE ORAL at 08:09

## 2019-09-29 RX ADMIN — FUROSEMIDE 160 MG: 10 INJECTION, SOLUTION INTRAVENOUS at 05:09

## 2019-09-29 RX ADMIN — INSULIN ASPART 8 UNITS: 100 INJECTION, SOLUTION INTRAVENOUS; SUBCUTANEOUS at 12:09

## 2019-09-29 NOTE — PLAN OF CARE
Patient safety maintained. Scheduled medications administered per MD order. Continuous dopamine infusing. Complains of abdominal pain. Prn Tylenol administered for pain relief. Prn insulin administered. Voiding without difficulty. Stand by assistance to bedside commode. 2L NC and continuous pulse ox. Will continue to monitor.

## 2019-09-29 NOTE — PLAN OF CARE
Cued into patient's room.  Patient not responding to introduction and permission inquiry.  Patient resting comfortably in bed with eyes closed; respirations even and unlabored.  No distress noted.    Labs, notes, and orders reviewed.

## 2019-09-29 NOTE — PLAN OF CARE
1830: Pt short of breath with activity at beginning of shift    at shift end was getting up to bedside commode without any notable change in breathing ability   lowest oxygen value seen on pulse oximeter was 95% after exerting herself to get out of bed this morning    castillo removed mid afternoon  pt able to void without difficulty this afternoon   safety maintained   capillary blood glucose required sliding scale twice this shift    values below 250   family stayed with pt for most of the day.  Oxygen continued as documented

## 2019-09-29 NOTE — PROGRESS NOTES
[facility] Nephrology Consult Note    Date of Admit: 2019      Subjective:   Feeling better today. SOB continues to improve      Objective:   Last 24 Hour Vital Signs:  BP  Min: 132/59  Max: 153/67  Temp  Av.3 °F (36.8 °C)  Min: 97.9 °F (36.6 °C)  Max: 99 °F (37.2 °C)  Pulse  Av.3  Min: 67  Max: 73  Resp  Av  Min: 15  Max: 18  SpO2  Av.5 %  Min: 99 %  Max: 100 %  Weight  Av.8 kg (171 lb 8.3 oz)  Min: 77.8 kg (171 lb 8.3 oz)  Max: 77.8 kg (171 lb 8.3 oz)  Body mass index is 28.54 kg/m².  I/O last 3 completed shifts:  In: 731.2 [P.O.:590; I.V.:141.2]  Out: 5195 [Urine:5195]    Physical Examination:  Gen: NAD, AAOx3, sitting up in bed  HEENT: NCAT, EOMI  Cardio: RRR, normal S1/S2, no MRG, ext jugular distended, JVP difficult to visualize  Lungs: on  on 3L NS, bibasilar crackles  Abd: soft non tender, non distended, normal bowel sounds, no hepatosplenomegaly  Ext: no edema b/l LE  Skin: warm, dry, no rashes noted  Neuro: moves all ext, sensation intact  Psych: conversational, responsive      Laboratory:  Most Recent Data:  CBC:   Lab Results   Component Value Date    WBC 9.46 2019    HGB 8.8 (L) 2019    HCT 26.8 (L) 2019     2019    MCV 80 (L) 2019    RDW 15.6 (H) 2019     BMP:   Lab Results   Component Value Date     2019    K 3.9 2019    CL 95 2019    CO2 28 2019    BUN 57 (H) 2019    CREATININE 1.9 (H) 2019     (H) 2019    CALCIUM 9.9 2019    MG 1.4 (L) 2019    PHOS 2.5 (L) 2019     LFTs:   Lab Results   Component Value Date    PROT 7.9 2019    ALBUMIN 2.8 (L) 2019    BILITOT 0.6 2019    AST 47 (H) 2019    ALKPHOS 228 (H) 2019    ALT 27 2019     Coags: No results found for: INR, PROTIME, PTT  Urinalysis:   Lab Results   Component Value Date    LABURIN  2019     Multiple organisms isolated. None in predominance.  Repeat if    LABURIN  clinically necessary. 09/21/2019    COLORU Yellow 09/21/2019    SPECGRAV 1.010 09/21/2019    NITRITE Negative 09/21/2019    KETONESU Negative 09/21/2019    UROBILINOGEN Negative 09/21/2019    WBCUA 60 (H) 09/21/2019       Trended Lab Data:  Recent Labs   Lab 09/26/19  0455 09/27/19  0420 09/28/19  0519 09/29/19  0455   WBC 8.24 9.19 9.46  --    HGB 8.9* 8.8* 8.8*  --    HCT 27.4* 27.2* 26.8*  --     298 283  --    MCV 81* 81* 80*  --    RDW 15.6* 15.7* 15.6*  --     138 137 137   K 3.9 3.6 3.3* 3.9    101 97 95   CO2 25 25 27 28   BUN 56* 57* 58* 57*   CREATININE 2.3* 2.2* 2.0* 1.9*   * 135* 125* 157*   PROT 8.0 7.9 7.7 7.9   ALBUMIN 2.8* 2.8* 2.7* 2.8*   BILITOT 0.4 0.5 0.6 0.6   AST 16 20 34 47*   ALKPHOS 171* 186* 219* 228*   ALT 8* 10 18 27       Trended Cardiac Data:  Recent Labs   Lab 09/24/19  1530 09/29/19  0455   * 90          Assessment and Plan:      Nidia Martel is a 79 y.o.female with  Patient Active Problem List    Diagnosis Date Noted    Secondary hyperparathyroidism of renal origin 09/26/2019    Diabetic nephropathy 09/26/2019    Malignant neoplasm of body of pancreas 09/25/2019    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    Malnutrition compromising bodily function 09/22/2019    Renal insufficiency 09/21/2019    Type 2 diabetes mellitus with chronic kidney disease, with long-term current use of insulin 09/21/2019    Obesity due to excess calories with serious comorbidity 09/21/2019    Hypertension due to endocrine disorder 09/21/2019    Pancreatic mass 09/20/2019    Congestive heart failure 09/20/2019      Volume overload in CKD 4  -Presents with shortness of breath, evidence of volume overload, poor response to lasix   -UA with 1+ proteinuria, BNP elevated 557, CXR with cardiomegaly and interstial markings  --3.5L UOP yesterday, Cont lasix 160 bid with metolazone 30 min before lasix.     CKD 4  -[facility] [place]  -Baseline Cr 2.2  -Cr near B/L, renal dose meds      HTN  -continue same management    Normocytic anemia  -Fe def, would add PO Fe as she is on it at home,  Hgb stable. No evidence of bleeding now    MBD  -Add ergo, PTH within goal     Prediabetes  -Needs counseling    Plan discussed with nephrology team attending Dr Margie aguilera MD  [facility]

## 2019-09-29 NOTE — PLAN OF CARE
Patient is awake and alert.  On room air with SATs saturation of 96%.  Up with assistant to bedside commode chair.  Sits up in chair for a couple of hours this am.  Has mild dyspnea on exertion.  Family members visited.  Voids without difficulty.  Has a bowel movement this shift.  Safety is maintained with bed low, wheels locked and side rails up.  Call light within reach.  Will continue to monitor.

## 2019-09-29 NOTE — PROGRESS NOTES
[facility] Neuroendocrine Surgery/General Surgery  Progress Note    Admit Date: 9/20/2019  Hospital Day: 9  Procedure: 6 Days Post-Op s/p EUS with biopsy    Subjective:  NAEO. AFVSS.   Intermittent HTN.  Tolerating diet, no nausea.   No chest pain.   SOB improving. On 2L nc to room air   Sitting up in the chair  On renal dopa.  diuresis yesterday, good UOP.  No nausea or emesis. Reports minimal appetite.     Per GI- EUS done. Many malignant appearing lymph nodes visualized in celiac region, peripancreatic region, richard hepatis. Hypoechoic. Fine needle biopsy performed. Core needle biopsy performed.  -one stent visualized endosonographically in CBD  -cystic lesions in the pancreatic body and tail    Physical Exam:  Gen: no acute distress. Alert and oriented x3.  HEENT: normocephalic and atraumatic. EOMI. MMM.  Resp: unlabored respirations, intermittent tachypnia, on O2 NC  CV: regular rate, distal pulses intact  Abd: soft, obese, vague tenderness, no distention  Ext: warm and well perfused, mild swelling  MSK: strength grossly intact  Neuro: no focal deficits, normal sensation    Labs reviewed    K 3.9  Cr 1.9    I/O (last 24 hours): -2863  UOP: 3.5L    Assessment/Plan: 79 y.o. 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  She has a pertinent past medical history of congestive heart failure which currently appears to be decompensated in terms of her current symptoms of shortness of breath and increased minimal lower extremity edema.   Pt will need contrast enhanced CT versus MRCP when able to give contrast, given kidney function, and FDG PET.    -s/p EUS   -SSI for DM  -replace electrolytes IV via PICC  -pain control, anti-emetics as needed  -Renal consulted for evaluation and recommendation for diuresis and optimization for possible contrast study  -diuresis  -dc renal dopa  -SCDs for ppx    Diet-  renal  Antibiotics- none  Prophylaxis- SCDs     Gomez Baker MD   [facility] General Surgery, PGY4  [phone]  09/29/2019     Inpatient Data     Vitals:   Temp:  [97.9 °F (36.6 °C)-99 °F (37.2 °C)] 98.1 °F (36.7 °C)  Pulse:  [67-73] 72  Resp:  [15-18] 15  SpO2:  [99 %-100 %] 100 %  BP: (132-153)/(59-68) 153/67 Intake/Output:  09/28 0701 - 09/29 0700  In: 731.2 [P.O.:590; I.V.:141.2]  Out: 3595 [Urine:3595]       Recent Labs     09/27/19  0420 09/28/19  0519 09/29/19  0455   WBC 9.19 9.46  --    HGB 8.8* 8.8*  --    HCT 27.2* 26.8*  --     283  --     137 137   K 3.6 3.3* 3.9    97 95   CO2 25 27 28   BUN 57* 58* 57*   CREATININE 2.2* 2.0* 1.9*   BILITOT 0.5 0.6 0.6   AST 20 34 47*   ALT 10 18 27   ALKPHOS 186* 219* 228*   CALCIUM 9.6 9.8 9.9   ALBUMIN 2.8* 2.7* 2.8*   PROT 7.9 7.7 7.9   MG 1.6 1.6 1.4*   PHOS 3.0  3.0 3.1  3.1 2.5*        Scheduled Meds:   aspirin  81 mg Oral Daily    carvedilol  12.5 mg Oral BID WM    ergocalciferol  50,000 Units Oral Q7 Days    fluticasone furoate-vilanterol  1 puff Inhalation Daily    furosemide  160 mg Intravenous BID    heparin (porcine)  5,000 Units Subcutaneous Q8H    metOLazone  10 mg Oral Daily    NIFEdipine  30 mg Oral Daily    sodium chloride 0.9%  10 mL Intravenous Q6H     Continuous Infusions:    PRN Meds:acetaminophen, Dextrose 10% Bolus, Dextrose 10% Bolus, dextrose 50 % in water (D50W), dextrose 50 % in water (D50W), glucagon (human recombinant), glucose, glucose, HYDROcodone-acetaminophen, insulin aspart U-100, ondansetron, Flushing PICC Protocol **AND** sodium chloride 0.9% **AND** sodium chloride 0.9%

## 2019-09-30 VITALS
OXYGEN SATURATION: 95 % | WEIGHT: 168 LBS | TEMPERATURE: 97 F | SYSTOLIC BLOOD PRESSURE: 150 MMHG | HEART RATE: 70 BPM | DIASTOLIC BLOOD PRESSURE: 67 MMHG | RESPIRATION RATE: 18 BRPM | HEIGHT: 65 IN | BODY MASS INDEX: 27.99 KG/M2

## 2019-09-30 DIAGNOSIS — C25.1 MALIGNANT NEOPLASM OF BODY OF PANCREAS: Primary | ICD-10-CM

## 2019-09-30 LAB
ALBUMIN SERPL BCP-MCNC: 3 G/DL (ref 3.5–5.2)
ALP SERPL-CCNC: 240 U/L (ref 55–135)
ALT SERPL W/O P-5'-P-CCNC: 34 U/L (ref 10–44)
ANION GAP SERPL CALC-SCNC: 16 MMOL/L (ref 8–16)
AST SERPL-CCNC: 55 U/L (ref 10–40)
BILIRUB SERPL-MCNC: 0.6 MG/DL (ref 0.1–1)
BUN SERPL-MCNC: 56 MG/DL (ref 8–23)
CALCIUM SERPL-MCNC: 9.8 MG/DL (ref 8.7–10.5)
CHLORIDE SERPL-SCNC: 90 MMOL/L (ref 95–110)
CO2 SERPL-SCNC: 30 MMOL/L (ref 23–29)
CREAT SERPL-MCNC: 2.1 MG/DL (ref 0.5–1.4)
EST. GFR  (AFRICAN AMERICAN): 25 ML/MIN/1.73 M^2
EST. GFR  (NON AFRICAN AMERICAN): 22 ML/MIN/1.73 M^2
GLUCOSE SERPL-MCNC: 146 MG/DL (ref 70–110)
MAGNESIUM SERPL-MCNC: 1.9 MG/DL (ref 1.6–2.6)
PHOSPHATE SERPL-MCNC: 3.6 MG/DL (ref 2.7–4.5)
POCT GLUCOSE: 148 MG/DL (ref 70–110)
POCT GLUCOSE: 152 MG/DL (ref 70–110)
POCT GLUCOSE: 239 MG/DL (ref 70–110)
POTASSIUM SERPL-SCNC: 3.5 MMOL/L (ref 3.5–5.1)
PROT SERPL-MCNC: 8.2 G/DL (ref 6–8.4)
SODIUM SERPL-SCNC: 136 MMOL/L (ref 136–145)

## 2019-09-30 PROCEDURE — 83735 ASSAY OF MAGNESIUM: CPT

## 2019-09-30 PROCEDURE — 99900035 HC TECH TIME PER 15 MIN (STAT)

## 2019-09-30 PROCEDURE — 94640 AIRWAY INHALATION TREATMENT: CPT

## 2019-09-30 PROCEDURE — 94664 DEMO&/EVAL PT USE INHALER: CPT

## 2019-09-30 PROCEDURE — A4216 STERILE WATER/SALINE, 10 ML: HCPCS | Performed by: STUDENT IN AN ORGANIZED HEALTH CARE EDUCATION/TRAINING PROGRAM

## 2019-09-30 PROCEDURE — 25000003 PHARM REV CODE 250: Performed by: STUDENT IN AN ORGANIZED HEALTH CARE EDUCATION/TRAINING PROGRAM

## 2019-09-30 PROCEDURE — 94799 UNLISTED PULMONARY SVC/PX: CPT

## 2019-09-30 PROCEDURE — 36415 COLL VENOUS BLD VENIPUNCTURE: CPT

## 2019-09-30 PROCEDURE — 94761 N-INVAS EAR/PLS OXIMETRY MLT: CPT

## 2019-09-30 PROCEDURE — 63600175 PHARM REV CODE 636 W HCPCS: Performed by: STUDENT IN AN ORGANIZED HEALTH CARE EDUCATION/TRAINING PROGRAM

## 2019-09-30 PROCEDURE — 80053 COMPREHEN METABOLIC PANEL: CPT

## 2019-09-30 PROCEDURE — 84100 ASSAY OF PHOSPHORUS: CPT

## 2019-09-30 RX ORDER — SPIRONOLACTONE 25 MG/1
25 TABLET ORAL DAILY
Status: DISCONTINUED | OUTPATIENT
Start: 2019-09-30 | End: 2019-09-30 | Stop reason: HOSPADM

## 2019-09-30 RX ORDER — CARVEDILOL 12.5 MG/1
12.5 TABLET ORAL 2 TIMES DAILY WITH MEALS
Qty: 60 TABLET | Refills: 11 | Status: SHIPPED | OUTPATIENT
Start: 2019-09-30 | End: 2020-09-29

## 2019-09-30 RX ORDER — SPIRONOLACTONE 25 MG/1
25 TABLET ORAL DAILY
Qty: 30 TABLET | Refills: 11 | Status: SHIPPED | OUTPATIENT
Start: 2019-10-01 | End: 2020-09-30

## 2019-09-30 RX ORDER — FUROSEMIDE 10 MG/ML
80 INJECTION INTRAMUSCULAR; INTRAVENOUS 2 TIMES DAILY
Status: DISCONTINUED | OUTPATIENT
Start: 2019-09-30 | End: 2019-09-30

## 2019-09-30 RX ADMIN — ASPIRIN 81 MG: 81 TABLET, COATED ORAL at 08:09

## 2019-09-30 RX ADMIN — NIFEDIPINE 30 MG: 30 TABLET, FILM COATED, EXTENDED RELEASE ORAL at 08:09

## 2019-09-30 RX ADMIN — HEPARIN SODIUM 5000 UNITS: 5000 INJECTION, SOLUTION INTRAVENOUS; SUBCUTANEOUS at 05:09

## 2019-09-30 RX ADMIN — CARVEDILOL 12.5 MG: 12.5 TABLET, FILM COATED ORAL at 05:09

## 2019-09-30 RX ADMIN — Medication 10 ML: at 11:09

## 2019-09-30 RX ADMIN — CARVEDILOL 12.5 MG: 12.5 TABLET, FILM COATED ORAL at 07:09

## 2019-09-30 RX ADMIN — HEPARIN SODIUM 5000 UNITS: 5000 INJECTION, SOLUTION INTRAVENOUS; SUBCUTANEOUS at 02:09

## 2019-09-30 RX ADMIN — INSULIN ASPART 4 UNITS: 100 INJECTION, SOLUTION INTRAVENOUS; SUBCUTANEOUS at 12:09

## 2019-09-30 RX ADMIN — SPIRONOLACTONE 25 MG: 25 TABLET, FILM COATED ORAL at 02:09

## 2019-09-30 RX ADMIN — FLUTICASONE FUROATE AND VILANTEROL TRIFENATATE 1 PUFF: 100; 25 POWDER RESPIRATORY (INHALATION) at 07:09

## 2019-09-30 RX ADMIN — HYDROCODONE BITARTRATE AND ACETAMINOPHEN 1 TABLET: 5; 325 TABLET ORAL at 10:09

## 2019-09-30 NOTE — PROGRESS NOTES
[facility] Neuroendocrine Surgery/General Surgery  Progress Note    Admit Date: 9/20/2019  Hospital Day: 10  Procedure: 7 Days Post-Op s/p EUS with biopsy    Subjective:  NAEO. AFVSS.   Intermittent HTN.  Tolerating diet, no nausea.   No chest pain.   SOB improving. On 2L nc to room air; breathing well on room air this morning.    Sitting up in the chair  Responding well to diuresis, good UOP.  No nausea or emesis. Reports minimal appetite.     Per GI- EUS done. Many malignant appearing lymph nodes visualized in celiac region, peripancreatic region, richard hepatis. Hypoechoic. Fine needle biopsy performed. Core needle biopsy performed.  -one stent visualized endosonographically in CBD  -cystic lesions in the pancreatic body and tail    Physical Exam:  Gen: no acute distress. Alert and oriented x3.  HEENT: normocephalic and atraumatic. EOMI. MMM.  Resp: unlabored respirations, intermittent tachypnia, on O2 NC  CV: regular rate, distal pulses intact  Abd: soft, obese, vague tenderness, no distention  Ext: warm and well perfused, mild swelling  MSK: strength grossly intact  Neuro: no focal deficits, normal sensation    Labs reviewed  Mg 1.9  K 3.5  Cr 2.1  BNP down to 90    I/O (last 24 hours): -1695  UOP: 2350    Assessment/Plan: 79 y.o. 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  She has a pertinent past medical history of congestive heart failure which currently appears to be decompensated in terms of her current symptoms of shortness of breath and increased minimal lower extremity edema.   Pt will need contrast enhanced CT versus MRCP when able to give contrast, given kidney function, and FDG PET.    S/p EUS with Biopsy, LN richard hepatis 9/23 -   Path: poorly differentiated neuroendocrine tumor. No lymph node definitively identified.   -positive for synaptophysin and chromogranin and  negative  for CK7. Proliferation index by Ki-67 is approximately 90%.    -diabetic/low sodium diet, as tolerated  -SSI for DM  -replace electrolytes IV via PICC  -pain control, anti-emetics as needed  -Renal consulted, appreciate recommendations for diuresis and optimization for possible contrast study  -diuresis  -dc renal dopa  -SCDs for ppx    Diet- renal  Antibiotics- none  Prophylaxis- SCDs     Jannie Acosta MD   [facility] General Surgery, PGY2  [phone]  09/30/2019     Inpatient Data     Vitals:   Temp:  [98 °F (36.7 °C)-98.6 °F (37 °C)] 98.4 °F (36.9 °C)  Pulse:  [65-80] 68  Resp:  [15-18] 18  SpO2:  [96 %-100 %] 96 %  BP: (111-153)/(60-67) 111/62 Intake/Output:  09/29 0701 - 09/30 0700  In: 655.8 [P.O.:320; I.V.:85.8]  Out: 2351 [Urine:2350]       Recent Labs     09/28/19  0519 09/29/19  0455 09/30/19  0411   WBC 9.46  --   --    HGB 8.8*  --   --    HCT 26.8*  --   --      --   --     137 136   K 3.3* 3.9 3.5   CL 97 95 90*   CO2 27 28 30*   BUN 58* 57* 56*   CREATININE 2.0* 1.9* 2.1*   BILITOT 0.6 0.6 0.6   AST 34 47* 55*   ALT 18 27 34   ALKPHOS 219* 228* 240*   CALCIUM 9.8 9.9 9.8   ALBUMIN 2.7* 2.8* 3.0*   PROT 7.7 7.9 8.2   MG 1.6 1.4* 1.9   PHOS 3.1  3.1 2.5* 3.6        Scheduled Meds:   aspirin  81 mg Oral Daily    carvedilol  12.5 mg Oral BID WM    ergocalciferol  50,000 Units Oral Q7 Days    fluticasone furoate-vilanterol  1 puff Inhalation Daily    heparin (porcine)  5,000 Units Subcutaneous Q8H    NIFEdipine  30 mg Oral Daily    sodium chloride 0.9%  10 mL Intravenous Q6H     Continuous Infusions:    PRN Meds:acetaminophen, Dextrose 10% Bolus, Dextrose 10% Bolus, dextrose 50 % in water (D50W), dextrose 50 % in water (D50W), glucagon (human recombinant), glucose, glucose, HYDROcodone-acetaminophen, insulin aspart U-100, ondansetron, Flushing PICC Protocol **AND** sodium chloride 0.9% **AND** sodium chloride 0.9%

## 2019-09-30 NOTE — PLAN OF CARE
VN rounds: VN cued into pt's room with pt's permission. Pt resting in bed. Fall risk protocol discussed with pt. VN instructed pt to call for assistance. Pt aware and agreeable. NAD noted. Allowed time for questions. Pt c/o headache. Tylenol offered. Bedside nurse notified. Will cont to be available and intervene as needed.     09/30/19 1007   Type of Frequent Check   Type Patient Rounds;Other (see comments)  (vn round)   Safety/Activity   Patient Rounds visualized patient;call light in patient/parent reach   Safety Promotion/Fall Prevention instructed to call staff for mobility;Fall Risk reviewed with patient/family   Positioning   Body Position supine   Head of Bed (HOB) HOB elevated   Positioning/Transfer Devices pillows;in use   Pain/Comfort/Sleep   Preferred Pain Scale word (verbal rating pain scale)   Pain Body Location head   Pain Rating (0-10): Rest 7   Sleep/Rest/Relaxation awake   Assessments (Pre/Post)   Level of Consciousness (AVPU) alert

## 2019-09-30 NOTE — PLAN OF CARE
Discussed with pt. Fall precautions and preventions.Call light and personal items are placed within the pt.s reach.Pt. Instructed to call for assistance before attempting to get out of the bed.Pt. Verbalizes understanding.

## 2019-09-30 NOTE — DISCHARGE SUMMARY
[facility] General Surgery  Neuroendocrine Tumor Service  Discharge Summary      Patient Name: Nidia Martel  MRN: [id number]  Admission Date: 9/20/2019  Hospital Length of Stay: 10 days  Discharge Date and Time:  09/30/2019 2:56 PM  Attending Physician: Toshia Jamison MD   Discharging Provider: Jannie Acosta MD  Primary Care Provider: Gabriele Shultz MD     HPI:  79-year-old female who was seen in clinic with Dr. Jamison for evaluation of pancreatic neuroendocrine tumor.  Patient had presented to the ED with abdominal pain and shortness of breath.  At that time, CT abdomen and pelvis demonstrated mass in the richard hepatis contiguous with the neck or body of the pancreas and concerning for malignancy.    Procedure(s) (LRB):  EUS with core biopsy (Left)     Hospital Course:  Patient was admitted from clinic due to shortness of breath and abdominal pain. Patient had been seen by Dr. Jamison for consultation given mass noted on CT abdomen pelvis at richard hepatis contiguous with pancreas.  Patient had undergone upper endoscopy with ultrasound and biopsy with GI.  Patient has past medical history significant for congestive heart failure and chronic kidney disease stage 3.  GI was consulted for EUS with biopsy.  Pathology demonstrates a poorly differentiated carcinoma consistent with neuroendocrine tumor.  Nephrology was consulted for recommendations on optimization given fluid overload, chronic kidney disease.  Cardiology was consulted for recommendations given congestive heart failure and fluid overload.  BNP on admission was elevated. Repeat BNP down to 90.  Patient was diuresed well inpatient and was weaned off oxygen.  Her shortness of breath has improved.  She is feeling better today and has been ambulating around the room. She is tolerating a regular diet and is voiding without issues.  Patient will be discharged today.  Her Coreg has been increased.  She will continue take Lasix outpatient.   Patient has family at home to help once she is discharged.  Routine discharge instructions given at the bedside.  Patient will follow up in clinic with neuroendocrine surgery.    Consults:   Consults (From admission, onward)        Status Ordering Provider     Inpatient consult to Gastroenterology-[facility]  Once     Provider:  (Not yet assigned)    Completed MIAN ALATORRE     Inpatient consult to Nephrology-[facility]  Once     Provider:  (Not yet assigned)    Completed MIAN ALATORRE     [facility]  Once     Provider:  (Not yet assigned)    Completed MIAN ALATORRE     [facility]  Once     Provider:  (Not yet assigned)    Completed MIAN ALATORRE          Significant Diagnostic Studies:  EUS with biopsy  CBC CMP  EKG  BNP    Pending Diagnostic Studies:     None        Final Active Diagnoses:    Diagnosis Date Noted POA    PRINCIPAL PROBLEM:  Congestive heart failure [I50.9] 09/20/2019 Yes    Secondary hyperparathyroidism of renal origin [N25.81] 09/26/2019 Yes    Diabetic nephropathy [E11.21] 09/26/2019 Yes    Malignant neoplasm of body of pancreas [C25.1] 09/25/2019 Yes    Secondary malignant neoplasm of intra-abdominal lymph nodes [C77.2] 09/25/2019 Yes    Malnutrition compromising bodily function [E46] 09/22/2019 Yes    Renal insufficiency [N28.9] 09/21/2019 Yes    Type 2 diabetes mellitus with chronic kidney disease, with long-term current use of insulin [E11.22, Z79.4] 09/21/2019 Not Applicable    Obesity due to excess calories with serious comorbidity [E66.09] 09/21/2019 Yes    Hypertension due to endocrine disorder [I15.2] 09/21/2019 Yes    Pancreatic mass [K86.9] 09/20/2019 Yes      Problems Resolved During this Admission:      Discharged Condition: stable    Disposition: Home or Self Care    Follow Up:  Follow-up Information     [facility] In 1 month.    Specialty:  Neuroendocrine  Contact information:  [address]  [place] [id number] [phone]  Additional information:  [address] Floor [address]               Patient Instructions:      Basic metabolic panel   Standing Status: Future Standing Exp. Date: 11/28/20     Diet renal     No driving until:   Order Comments: Until off pain medication     Notify your health care provider if you experience any of the following:  persistent dizziness, light-headedness, or visual disturbances     Notify your health care provider if you experience any of the following:  difficulty breathing or increased cough     Activity as tolerated     Medications:  Reconciled Home Medications:      Medication List      START taking these medications    spironolactone 25 MG tablet  Commonly known as:  ALDACTONE  Take 1 tablet (25 mg total) by mouth once daily.  Start taking on:  October 1, 2019        CHANGE how you take these medications    carvedilol 12.5 MG tablet  Commonly known as:  COREG  Take 1 tablet (12.5 mg total) by mouth 2 (two) times daily with meals.  What changed:    · medication strength  · how much to take        CONTINUE taking these medications    amitriptyline 25 MG tablet  Commonly known as:  ELAVIL  Take 25 mg by mouth nightly as needed for Insomnia.     aspirin 325 MG tablet  Take 325 mg by mouth once daily.     budesonide-formoterol 160-4.5 mcg 160-4.5 mcg/actuation Hfaa  Commonly known as:  SYMBICORT  Inhale 2 puffs into the lungs every 12 (twelve) hours. Controller     calcitRIOL 0.5 MCG Cap  Commonly known as:  ROCALTROL  Take 0.5 mcg by mouth once daily.     ergocalciferol 50,000 unit Cap  Commonly known as:  ERGOCALCIFEROL  Take 50,000 Units by mouth Daily.     furosemide 20 MG tablet  Commonly known as:  LASIX  Take 20 mg by mouth 2 (two) times daily.     hydrALAZINE 10 MG tablet  Commonly known as:  APRESOLINE  Take 10 mg by mouth 3 (three) times daily.     HYDROcodone-acetaminophen 5-325 mg per tablet  Commonly known as:  NORCO  Take 1 tablet by mouth every 6  (six) hours as needed for Pain.     losartan 100 MG tablet  Commonly known as:  COZAAR  Take 100 mg by mouth once daily.     NIFEdipine 30 MG Tbsr  Commonly known as:  ADALAT CC  Take 30 mg by mouth once daily.     ranitidine 150 MG capsule  Commonly known as:  ZANTAC  Take 150 mg by mouth 2 (two) times daily as needed.     TRADJENTA 5 mg Tab tablet  Generic drug:  linaGLIPtin  Take 5 mg by mouth once daily.        STOP taking these medications    dicyclomine 20 mg tablet  Commonly known as:  BENTYL     LANTUS SOLOSTAR U-100 INSULIN glargine 100 units/mL (3mL) SubQ pen  Generic drug:  insulin            Jannie Acosta MD  General Surgery  Neuroendocrine Tumor Service  [facility]

## 2019-09-30 NOTE — NURSING
Pt. assesment completed with no complications.Pt. Denies any pain or discomfort at this time.Will continue to monitor.

## 2019-09-30 NOTE — PHYSICIAN QUERY
PT Name: Nidia Martel  MR #: [id number]     Physician Query Form - Documentation Clarification      CDS/: Leatha Presley               Contact information: [email or web address]      This form is a permanent document in the medical record.     Query Date: September 30, 2019    By submitting this query, we are merely seeking further clarification of documentation. Please utilize your independent clinical judgment when addressing the question(s) below.    The Medical record reflects the following:    Supporting Clinical Findings Location in Medical Record   Hypertension due to endocrine disorder    CKD 4  -Follows with Dr. Josh Henderson in [facility]  -Baseline Cr 2.2  -Cr near B/L, renal dose meds   Nephrology note 9/29     Nephrology note 9/29                                                                               Doctor, Please specify diagnosis or diagnoses associated with above clinical findings.    Provider Use Only      [    ] Hypertension is a manifestation of Diabetes (secondary HTN)  [    ] Hypertension is not a manifestation of Diabetes   [ X   ] Other:__clinically undetermined___________________                                                                                                               [  ] Clinically Undetermined

## 2019-09-30 NOTE — PLAN OF CARE
Discharge rounds on patient. Discussed followup appointments, blue discharge folder, discharge nurse will go over home medications and reasons for medications and final discharge instructions. All patient/caregiver questions answered. Patient verbalized understanding. Patient to go home with son in [facility] next appointment on 10/9/19.       09/30/19 1652   Final Note   Assessment Type Final Discharge Note   Anticipated Discharge Disposition Home   What phone number can be called within the next 1-3 days to see how you are doing after discharge? [phone]   Hospital Follow Up  Appt(s) scheduled? Yes   Discharge plans and expectations educations in teach back method with documentation complete? Yes   Right Care Referral Info   Post Acute Recommendation SNF / Sub-Acute Rehab   Referral Type None   (Going home with Son)     Future Appointments   Date Time Provider Department Center   10/3/2019  8:30 AM SAME DAY SARAH ESQUIVEL [facility] LAB [facility]   10/3/2019  9:30 AM [facility] PET CT1 LIMIT 500 LBS [facility] PET CT [facility]   10/9/2019 11:30 AM Rangel Antony DO, FACP [facility] TUMOR [facility]

## 2019-09-30 NOTE — H&P
NOLANETS:  [facility]  A collaboration between [facility] and [facility]        PATIENT: Nidia Martel  MRN: [id number]  DATE: 9/19/2019     Subjective:   Chief Complaint: Consult for pancreatic neuroendocrine tumor.  Review most recent imaging and blood work results. Establish surveillance and treatment plan.     Overview / HPI: This nice lady presented to the emergency department with abdominal pain and shortness of breath.  According to her and her family she was evaluated with blood work, an EKG, and a CT scan of the abdomen. Seen that they discharged her from the emergency department, my understanding was that they did not find anything on her EKG or blood work that would suggest that she had a decompensation of 1 or more of her chronic medical problems.  However the CT scan the abdomen pelvis reportedly showed a mass in the richard hepatis contiguous with the neck or body of the pancreas concerning for malignancy.  She was discharged from the emergency department with instructions to follow up with a gastroenterologist.  She underwent an upper endoscopy with an endoscopic ultrasound and biopsy of the pancreatic mass.  I do not have a description of that procedure. I do have a pathology report that is suggestive of a poorly differentiated carcinoma with neuroendocrine features.   She was then sent to a Dr. Thacker, a medical oncologist, who discussed the neuroendocrine features of the case and referred her to our known at its program for a 2nd opinion on management.        She has a significant past medical history of congestive heart failure for which she has been hospitalized and put on intermittent oxygen.  Her current performance status is approximately 50%.          Interval History:   Recent testing includes Ct abd/pelvis (9/2019).     Vitals: There were no vitals taken for this visit. --stable  ECOG Score: 3 -  Symptomatic, >50% confined to bed     Oncologic History:   Oncologic History PNET- dx-8/2019   Oncologic Treatment     Pathology 8/2019- panc bx- poorly diff carcinoma with net features      Past Medical History:       Past Medical History:   Diagnosis Date    Arthritis      CKD (chronic kidney disease)      DM (diabetes mellitus)      GERD (gastroesophageal reflux disease)      HTN (hypertension)      Hyperlipidemia      Primary malignant neuroendocrine neoplasm of pancreas           Past Surgical History:  No past surgical history on file.     Family History:  No family history on file.     Allergies:  Epinephrine     Medications:       Current Outpatient Medications   Medication Sig    amitriptyline (ELAVIL) 25 MG tablet Take 25 mg by mouth nightly as needed for Insomnia.    budesonide-formoterol 160-4.5 mcg (SYMBICORT) 160-4.5 mcg/actuation HFAA Inhale 2 puffs into the lungs every 12 (twelve) hours. Controller    calcitRIOL (ROCALTROL) 0.5 MCG Cap Take 0.5 mcg by mouth once daily.    carvedilol (COREG) 6.25 MG tablet Take 6.25 mg by mouth 2 (two) times daily with meals.    dicyclomine (BENTYL) 20 mg tablet Take 20 mg by mouth every 6 (six) hours.    furosemide (LASIX) 20 MG tablet Take 20 mg by mouth 2 (two) times daily.    hydrALAZINE (APRESOLINE) 10 MG tablet Take 10 mg by mouth 3 (three) times daily.    HYDROcodone-acetaminophen (NORCO) 5-325 mg per tablet Take 1 tablet by mouth every 6 (six) hours as needed for Pain.    linaGLIPtin (TRADJENTA) 5 mg Tab tablet Take 5 mg by mouth once daily.    losartan (COZAAR) 100 MG tablet Take 100 mg by mouth once daily.    NIFEdipine (ADALAT CC) 30 MG TbSR Take 30 mg by mouth once daily.    ranitidine (ZANTAC) 150 MG capsule Take 150 mg by mouth 2 (two) times daily.      No current facility-administered medications for this visit.          Review of Systems   Constitutional: Positive for activity change, appetite change and fatigue.         Objective:    Physical Exam      Lab Data:  Neuroendocrine Labs Latest Ref Rng & Units 8/22/2019   EXT WBC 4.8 - 10.8 k/ul 11.1 (A)   EXT HGB 12.0 - 16.0 gm/dl 11.2 (A)   EXT HCT 37 - 47 % 35.0 (A)   EXT PLATLETS 140 - 450 k/ul 293   EXT GLUCOSE 74 - 106 mg/dl 88   EXT BUN 7 - 18 mg/dl 32 (A)   EXT CREATININE 0.6 - 1.3 mg/dL 1.9 (A)   EXT  - 145 mmol/L 140   EXT K 3.5 - 5.2 mmol/l 4.2   EXT CO2 21 - 32 mmol/l 30   EXT CALCIUM 9.5 - 10.1 mg/dl 9.9   EXT PROTEIN, TOTAL 6.4 - 8.2 g/dl 8.8 (A)   EXT TOTAL BILIRUBIN 0.2 - 1.0 mg/dl 0.4   EXT ALK PHOSPHATASE 46 - 116 iu/l 292 (A)   EXT SGOT (AST) 15 - 37 iu/l 26   EXT ALT 12 - 78 iu/l 38   EXT LIPASE 73 - 393 iu/l 222         Scans:   CT abd/pelvis wo constrst:-9/11/19                Assessment/Impression:              Problem List Items Addressed This Visit      None             Plan:       I had a long conversation with the patient and her family about the evaluation and potential treatment recommendations outlined below:  1.  The pathology report showing a poorly differentiated tumor with neuroendocrine features is the most telling piece of information that we received on this case. An adenocarcinoma of the pancreas with neuroendocrine features would be a high-grade cancer with a very guarded prognosis despite therapy.  A high-grade neuroendocrine tumor of the pancreas would also come with tempered expectations on treatment response in overall survival.  It is imperative that we pull that biopsy specimen in and have it read by pathologist that are familiar with both pancreatic cancer as well as neuroendocrine carcinoma to get a better understanding of what treatment would be more or less appropriate.  2.  We have a report but no images from a noncontrast CT scan of the abdomen. This would be unable to determine resectability of a cancer in this area. Given the size of the tumor and location of the tumor within the richard hepatis, there would be a moderate to high likelihood  that the case would be borderline if not unresectable.  If she has comorbid conditions that prohibit a contrast enhanced scan, this would be an additional recent Y surgical intervention would be associated with substantial risk of prolonged recovery.  It is imperative that we have additional imaging to better understand the extent of disease as well as the biologic behavior.  3.  Given she was referred with a pancreatic neuroendocrine tumor, and prior to our having the pathology report, she was due to get a gallium 68 somatostatin receptor PET.  For variety of reasons this was not done and I am seeing her with additional information that would suggest that that would be less likely to give us valuable information than an FDG PET for a higher grade tumor.  I explained to the family that FDG PET is primarily utilized for higher grade tumors to both understand local regional involvement and metastatic disease.  If she cannot get IV contrast to better understand local regional involvement from a surgical standpoint, a PET-CT would be the next best option for evaluating several aspects of the tumor including differentiation status extent of local regional disease and evidence of metastatic disease for which surgical intervention would be unwarranted.  4.  She has a number of comorbid conditions of which a history of decompensated congestive heart failure would be the most significant when discussing a life-limiting problem like a pancreatic cancer.  She is having worsening shortness of breath and intermittent lower extremity peripheral edema despite taking her medications.  Her performance status is at around 50%, she is coming into the office in a wheelchair, she is short of breath at rest, and I think that for a number of reasons she would be better served coming into the hospital for optimization of her more comorbid conditions.  She will need a cardiac evaluation including an echocardiogram and potentially a nuclear  medicine stress test. She may need to be rescoped with the intention of understanding the relationship of the CT scan abnormalities to the superior mesenteric artery and vein as well as an attempt to re-biopsy the abnormality.  We will need to either track down her CT scan done at the outside facility or reimage her to better understand how that tumor and its location are contributing to her symptoms and whether not we have any palliative treatment options for her in the short term.  If a short stay in the hospital with a medical team trying to optimize her comorbid conditions, we would be able to get additional important information that would allow us to better understand her short-term and longer-term treatment options.  At this juncture, I do not have any convincing evidence that surgical resection would be in her best interest.     Admit to the hospital  Cardiopulmonary evaluation  A weighted care consult  Consider contrast-enhanced CT scan versus MRCP  FDG PET        Toshia Jamison MD, MPH, FACS  Professor of Surgery, [facility]  Neuroendocrine Surgery, Hepatic/Pancreatic & General Surgical Oncology  [address]  [place]  [address]  ph. [phone]; [phone]  fax. [phone]      Revision History

## 2019-09-30 NOTE — PLAN OF CARE
VN reviewed discharge instructions with pt.  AVS printed and handed to pt by bedside nurse.  Reviewed follow-up appointments, medications, diet, and importance of medication compliance.  Reviewed home care instructions, treatment plan, self-management, and when to seek medical attention.  Allowed time for questions.  All questions answered.  Patient verbalized complete understanding of discharge instructions and voices no concerns.     Discharge instructions complete. Waiting on  Bedside delivery.     Bedside nurse notified.

## 2019-09-30 NOTE — PLAN OF CARE
Cued into patient's room.  Permission received per patient to turn camera to view patient.  Introduced as VN for night shift that will be working with floor nurse and nursing assistant.  IVY Liu assisting patient to BSC; family member at bedside. Instructed to call for assistance.  Will cont to monitor.    Labs, notes, and orders reviewed.

## 2019-09-30 NOTE — PROGRESS NOTES
[facility] Progress Note    Date of Admit: 2019      Subjective:   Cont to improve from resp standpoint.      Objective:   Last 24 Hour Vital Signs:  BP  Min: 111/62  Max: 183/74  Temp  Av °F (36.7 °C)  Min: 97 °F (36.1 °C)  Max: 98.6 °F (37 °C)  Pulse  Av.8  Min: 65  Max: 80  Resp  Av.6  Min: 15  Max: 18  SpO2  Av.7 %  Min: 95 %  Max: 96 %  Weight  Av.2 kg (167 lb 15.9 oz)  Min: 76.2 kg (167 lb 15.9 oz)  Max: 76.2 kg (167 lb 15.9 oz)  Body mass index is 27.96 kg/m².  I/O last 3 completed shifts:  In: 1026.2 [P.O.:620; I.V.:156.2; IV Piggyback:250]  Out: 4126 [Urine:4125; Stool:1]    Physical Examination:  Gen: NAD, AAOx3, sitting up in bed  HEENT: NCAT, EOMI  Cardio: RRR, normal S1/S2, no MRG, ext jugular distended, JVP difficult to visualize  Lungs: On RA, no inc wob, , bibasilar crackles resolved  Abd: soft non tender, non distended  Ext: no edema b/l LE  Skin: warm, dry, no rashes noted  Neuro: moves all ext, sensation intact  Psych: conversational, responsive      Laboratory:  Most Recent Data:  CBC:   Lab Results   Component Value Date    WBC 9.46 2019    HGB 8.8 (L) 2019    HCT 26.8 (L) 2019     2019    MCV 80 (L) 2019    RDW 15.6 (H) 2019     BMP:   Lab Results   Component Value Date     2019    K 3.5 2019    CL 90 (L) 2019    CO2 30 (H) 2019    BUN 56 (H) 2019    CREATININE 2.1 (H) 2019     (H) 2019    CALCIUM 9.8 2019    MG 1.9 2019    PHOS 3.6 2019     LFTs:   Lab Results   Component Value Date    PROT 8.2 2019    ALBUMIN 3.0 (L) 2019    BILITOT 0.6 2019    AST 55 (H) 2019    ALKPHOS 240 (H) 2019    ALT 34 2019     Coags: No results found for: INR, PROTIME, PTT  Urinalysis:   Lab Results   Component Value Date    LABURIN  2019     Multiple organisms isolated. None in predominance.  Repeat if    LABURIN clinically  necessary. 09/21/2019    COLORU Yellow 09/21/2019    SPECGRAV 1.010 09/21/2019    NITRITE Negative 09/21/2019    KETONESU Negative 09/21/2019    UROBILINOGEN Negative 09/21/2019    WBCUA 60 (H) 09/21/2019       Trended Lab Data:  Recent Labs   Lab 09/26/19  0455 09/27/19  0420 09/28/19  0519 09/29/19  0455 09/30/19  0411   WBC 8.24 9.19 9.46  --   --    HGB 8.9* 8.8* 8.8*  --   --    HCT 27.4* 27.2* 26.8*  --   --     298 283  --   --    MCV 81* 81* 80*  --   --    RDW 15.6* 15.7* 15.6*  --   --     138 137 137 136   K 3.9 3.6 3.3* 3.9 3.5    101 97 95 90*   CO2 25 25 27 28 30*   BUN 56* 57* 58* 57* 56*   CREATININE 2.3* 2.2* 2.0* 1.9* 2.1*   * 135* 125* 157* 146*   PROT 8.0 7.9 7.7 7.9 8.2   ALBUMIN 2.8* 2.8* 2.7* 2.8* 3.0*   BILITOT 0.4 0.5 0.6 0.6 0.6   AST 16 20 34 47* 55*   ALKPHOS 171* 186* 219* 228* 240*   ALT 8* 10 18 27 34       Trended Cardiac Data:  Recent Labs   Lab 09/24/19  1530 09/29/19  0455   * 90          Assessment and Plan:      Nidia Martel is a 79 y.o.female with  Patient Active Problem List    Diagnosis Date Noted    Secondary hyperparathyroidism of renal origin 09/26/2019    Diabetic nephropathy 09/26/2019    Malignant neoplasm of body of pancreas 09/25/2019    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    Malnutrition compromising bodily function 09/22/2019    Renal insufficiency 09/21/2019    Type 2 diabetes mellitus with chronic kidney disease, with long-term current use of insulin 09/21/2019    Obesity due to excess calories with serious comorbidity 09/21/2019    Hypertension due to endocrine disorder 09/21/2019    Pancreatic mass 09/20/2019    Congestive heart failure 09/20/2019      Volume overload in CKD 4  -Presents with shortness of breath, evidence of volume overload, poor response to lasix   -UA with 1+ proteinuria, BNP elevated 557, CXR with cardiomegaly and interstial markings  -net >-10 L, now with mild  alkalosis and small bump in Cr. Holding lasix and metolazone now and will reassess in AM to resume PO regimen     CKD 4  -Follows with [facility]. Josh Henderson in [facility] [place]  -Baseline Cr 2.2  -Near b/l, will monitor, hold diuretics as above    Neuroendocrine Tumor  -Per surgery    HTN  -continue same management    Normocytic anemia  -Fe def, would add PO Fe as she is on it at home,  Hgb stable. No evidence of bleeding now    MBD  -Added ergo, PTH within goal     Prediabetes  -Needs counseling      Fernando Jorgensen MD  [facility]

## 2019-10-01 ENCOUNTER — PATIENT OUTREACH (OUTPATIENT)
Dept: ADMINISTRATIVE | Facility: CLINIC | Age: 79
End: 2019-10-01

## 2019-10-01 NOTE — PATIENT INSTRUCTIONS
When Your Child Has Congestive Heart Failure (CHF)  Congestive heart failure (CHF) is a condition in which the heart does not pump as well as it should. When this happens, fluid can build up in the lungs or body tissues (congestion). CHF can cause lung problems, organ failure, and other serious problems in the body. CHF can usually be treated, but it is important to find out the underlying cause. Your childs healthcare provider will evaluate your childs heart and discuss treatment options with you.  What causes congestive heart failure?  CHF often develops in children with certain heart defects present at birth (congenital heart defects). These include defects such as holes in the heart, which cause an increased amount of blood flow from one side of the heart to the other. This changes the dynamics of blood flow and can cause one side of the heart to become weaker. The heart then is unable to support the blood flow resulting in worsening heart function. CHF can also be caused by other types of heart problems such as cardiomyopathy, a condition in which the hearts pumping function is impaired. Some non-heart problems, such as kidney failure, can lead to CHF due to changes in the body's fluid balance or hormone changes that lead to high blood pressure.    What are the symptoms of CHF?  Symptoms vary but may include:  · Swelling (edema) in the face, abdomen, ankles, or feet  · Shortness of breath, rapid breathing, wheezing, or excessive coughing  · Sweating  · Weakness or tiredness  · Poor feeding and weight gain (in infants)  · Racing heartbeat  · Wheezing  · Abdominal pain and nausea  In older children, symptoms may also include:  · Weight loss  · Passing out  · Chest pain  · Tiring easily during exercise  How is the cause of congestive heart failure diagnosed?  Heart problems in children are usually diagnosed and treated by a pediatric cardiologist. This is a doctor who specializes in diagnosing and treating  children's heart disease. The cardiologist will do a physical exam and ask about your childs health history. The following tests may be done to find the underlying cause of CHF:  · Chest X-ray. This test takes a picture of the heart and lungs. The picture can show your childs heart size and shape. This picture also shows the fluid status of your child's lungs, which can be a clue to the heart's function.  · Electrocardiogram (ECG or EKG). During this test, the electrical activity of the heart is recorded to check for heart rhythm problems (arrhythmias) or problems with heart structure.  · Echocardiogram (echo). During this test, sound waves (ultrasound) are used to create a picture of the heart. This test can show problems with heart structure or function. This includes showing how well the heart pumps, if the heart is enlarged, the direction and strength of blood flow, or if there are any valve problems.  · Lab tests. For these tests, blood and urine samples are taken to check for problems in the kidneys or other organs.  How is congestive heart failure treated?  Specific treatment for your child depends on the cause of CHF. If the cause of CHF in your child is a congenital heart defect, a catheter or surgical procedure may be done to repair the defect.  · Medicines are often prescribed to help manage your childs symptoms. These can include:  ¨ Diuretics help rid the body of excess water. This reduces fluid in the lungs and may improve breathing. These are very important in helping manage fluid status in heart failure.  ¨ Digoxin helps the heart pump blood with more force. This improves the hearts performance.  ¨ ACE inhibitors make blood vessels relax and allow blood to flow more easily from the heart.   ¨ Angiotensin receptor blockers or ARBs are very similar to ACE inhibitors. They may be used in a child who can't take an ACE inhibitor.  ¨ Beta-blockers lower blood pressure and slow heart rate by altering  hormones that can damage the heart. Beta-blockers can also improve the hearts pumping action over time.  · Pacemaker. Some children with heart failure need an artificial pacemaker. The pacemaker may help when the heart is not pumping well because of a slow heartbeat.  · Cardiac resynchronization therapy. This uses a special type of pacemaker that paces both pumping chambers of the heart at the same time to coordinate contractions and improve the heart's function. This treatment may be used in some children with long-term heart failure.  · Heart transplant. This is when the diseased heart is replaced with a healthy heart from a donor. This is only an option for very serious disease. And, it often takes some time to find a suitable heart. In some cases, a child may be able to be helped with devices that help the heart pump while he or she waits for a transplant.  Your child may benefit from seeing a nutritionist to help with nutrition for growth problems and to help balance fluids. He or she may also participate in an exercise rehab program to help with the ability to be active.  What are the long-term concerns?  The outcome for a child with CHF depends on many factors, including the underlying heart problem. The cardiologist will discuss your childs condition, treatment options, and potential outcomes with you.  Date Last Reviewed: 3/6/2016  © 5620-7371 [employer]. [address] [place] [address], [place] [address]. All rights reserved. This information is not intended as a substitute for professional medical care. Always follow your healthcare professional's instructions.

## 2019-10-03 ENCOUNTER — HOSPITAL ENCOUNTER (OUTPATIENT)
Dept: RADIOLOGY | Facility: HOSPITAL | Age: 79
Discharge: HOME | End: 2019-10-03
Attending: INTERNAL MEDICINE
Payer: MEDICARE

## 2019-10-03 DIAGNOSIS — C25.1 MALIGNANT NEOPLASM OF BODY OF PANCREAS: ICD-10-CM

## 2019-10-03 LAB — POCT GLUCOSE: 248 MG/DL (ref 70–110)

## 2019-10-03 PROCEDURE — A9552 F18 FDG: HCPCS

## 2019-10-03 PROCEDURE — 78815 PET IMAGE W/CT SKULL-THIGH: CPT | Mod: 26,PS,, | Performed by: RADIOLOGY

## 2019-10-03 PROCEDURE — 78815 PET IMAGE W/CT SKULL-THIGH: CPT | Mod: TC,PI

## 2019-10-03 PROCEDURE — 78815 NM PET CT ROUTINE: ICD-10-PCS | Mod: 26,PS,, | Performed by: RADIOLOGY

## 2019-10-04 ENCOUNTER — TELEPHONE (OUTPATIENT)
Dept: NEUROLOGY | Facility: HOSPITAL | Age: 79
End: 2019-10-04

## 2019-10-04 LAB — POCT GLUCOSE: 98 MG/DL (ref 70–110)

## 2019-10-04 NOTE — TELEPHONE ENCOUNTER
----- Message from Loren Alexander MA sent at 10/3/2019  6:36 PM CDT -----  Contact: Pt's son Javy [phone]  Please see below.    ----- Message -----  From: Nicole Carrero  Sent: 10/3/2019   3:40 PM CDT  To: Yaquelin Pope [facility]-----Pt's son is requesting a callback in regards to having questions about the pt's medication.  Pt's son Javy stated that the pt was told not to take her insulin and start another medication that was prescribed by the doctor and since the changing of the medication the pt's sugar level have been high.  Pt's son stated that she was scheduled for a test on today but couldn't take it due to the pt's sugar level was high and it was rescheduled for tomorrow.    Please call.

## 2019-10-04 NOTE — TELEPHONE ENCOUNTER
Spoke to patient's son. He stated patient wasn't taking her medication properly because she was told when she left to hospital to discontinue all but 2 meds and that included her insulin.  She got confused, she was turned away from her PET scan yesterday.  Patient took her insulin today, was able to get her PET scan, I suggested she see her PCP since he orders her meds so they can go over the medications she takes.      I then explained the purpose of the consult.

## 2019-10-08 RX ORDER — FLUTICASONE PROPIONATE 50 MCG
SPRAY, SUSPENSION (ML) NASAL
Refills: 5 | COMMUNITY
Start: 2019-08-08

## 2019-10-08 RX ORDER — ATORVASTATIN CALCIUM 40 MG/1
40 TABLET, FILM COATED ORAL
COMMUNITY

## 2019-10-09 ENCOUNTER — OFFICE VISIT (OUTPATIENT)
Dept: NEUROLOGY | Facility: HOSPITAL | Age: 79
End: 2019-10-09
Attending: INTERNAL MEDICINE
Payer: MEDICARE

## 2019-10-09 VITALS
WEIGHT: 155.75 LBS | HEIGHT: 65 IN | SYSTOLIC BLOOD PRESSURE: 122 MMHG | HEART RATE: 75 BPM | DIASTOLIC BLOOD PRESSURE: 58 MMHG | TEMPERATURE: 99 F | BODY MASS INDEX: 25.95 KG/M2 | OXYGEN SATURATION: 96 %

## 2019-10-09 DIAGNOSIS — C77.2 SECONDARY MALIGNANT NEOPLASM OF INTRA-ABDOMINAL LYMPH NODES: Primary | ICD-10-CM

## 2019-10-09 DIAGNOSIS — C7A.1 MALIGNANT POORLY DIFFERENTIATED NEUROENDOCRINE CARCINOMA: ICD-10-CM

## 2019-10-09 DIAGNOSIS — G89.3 CANCER ASSOCIATED PAIN: ICD-10-CM

## 2019-10-09 PROCEDURE — 99205 OFFICE O/P NEW HI 60 MIN: CPT | Mod: ,,, | Performed by: INTERNAL MEDICINE

## 2019-10-09 PROCEDURE — 99205 PR OFFICE/OUTPT VISIT, NEW, LEVL V, 60-74 MIN: ICD-10-PCS | Mod: ,,, | Performed by: INTERNAL MEDICINE

## 2019-10-09 PROCEDURE — 99215 OFFICE O/P EST HI 40 MIN: CPT | Performed by: INTERNAL MEDICINE

## 2019-10-09 RX ORDER — ALENDRONATE SODIUM 70 MG/1
70 TABLET ORAL
COMMUNITY

## 2019-10-09 RX ORDER — INSULIN DEGLUDEC 100 U/ML
INJECTION, SOLUTION SUBCUTANEOUS
Refills: 3 | COMMUNITY
Start: 2019-08-07

## 2019-10-09 RX ORDER — ALBUTEROL SULFATE 0.83 MG/ML
2.5 SOLUTION RESPIRATORY (INHALATION) EVERY 6 HOURS PRN
COMMUNITY

## 2019-10-09 RX ORDER — IPRATROPIUM BROMIDE 0.5 MG/2.5ML
500 SOLUTION RESPIRATORY (INHALATION) 4 TIMES DAILY
COMMUNITY

## 2019-10-09 RX ORDER — VALSARTAN 320 MG/1
320 TABLET ORAL DAILY
COMMUNITY
End: 2019-10-14

## 2019-10-09 RX ORDER — DICLOFENAC SODIUM AND MISOPROSTOL 75; 200 MG/1; UG/1
1 TABLET, DELAYED RELEASE ORAL 2 TIMES DAILY
COMMUNITY

## 2019-10-09 RX ORDER — FERROUS SULFATE 324(65)MG
325 TABLET, DELAYED RELEASE (ENTERIC COATED) ORAL DAILY
COMMUNITY
End: 2019-10-14

## 2019-10-09 RX ORDER — HYDROCODONE BITARTRATE AND ACETAMINOPHEN 5; 325 MG/1; MG/1
1 TABLET ORAL EVERY 6 HOURS PRN
Qty: 50 TABLET | Refills: 0 | Status: SHIPPED | OUTPATIENT
Start: 2019-10-09 | End: 2019-10-14

## 2019-10-09 NOTE — LETTER
October 9, 2019        Randall Thacker MD  [address]  [address]  [place] [address]             [facility]  [address] [place] [address]  Phone: [phone]  Fax: [phone]   Patient: Nidia Martel   MR Number: [id number]   YOB: 1940   Date of Visit: 10/9/2019       Dear Dr. Thacker:    Thank you for referring Nidia Martel to me for evaluation. Attached you will find relevant portions of my assessment and plan of care.    If you have questions, please do not hesitate to call me. I look forward to following Nidia Martel along with you.    Sincerely,      Rangel Antony DO, FACP            CC  NALLELY Miramontes MD    Enclosure

## 2019-10-09 NOTE — PROGRESS NOTES
NOLANETS:  [facility]  A collaboration between [facility] and [facility]    PATIENT: Nidia Martel  MRN: [id number]  DATE: 10/9/2019      Diagnosis:   1. Secondary malignant neoplasm of intra-abdominal lymph nodes    2. Malignant poorly differentiated neuroendocrine carcinoma    3. Cancer associated pain        Chief Complaint: Follow-up (hospital follow up)      Oncologic History:      Oncologic History Pancreatic neuroendocrine carcinoma diagnosed 08/2019  Metastatic disease to lymph nodes at presentation    Oncologic Treatment     Pathology Prolia differentiated neuroendocrine carcinoma, Ki-67 90%.          Subjective:    Interval History: Ms. Martel is a 79 y.o. female who is seen as an initial visit for a pancreatic neuroendocrine carcinoma.  Her history dates to 8/2019 when she sought medical attention for abdominal pain. CT was performed showing an ill-defined soft tissue mass in the region of the pancreatic head/richard hepaticus measuring 4.8 cm with moderate alexander aortic lymphadenopathy also noted within the abdomen.  She underwent a biopsy with pathology consistent of a poorly differentiated carcinoma with neuroendocrine features.  She had sought medical attention at our institution given her neuroendocrine diagnosis and a repeat ERCP was performed showing a few cystic lesions within the pancreatic body and pancreatic tail and many malignant-appearing lymph nodes visualized in the celiac region, peripancreatic region and richard hepatic us region.  A biopsy was performed showing a poorly differentiated neuroendocrine carcinoma with Ki-67 90%.  FDG PET-CT performed in 10/2019 had shown uptake within pancreatic mass along with multiple abdominal lymph nodes, left supraclavicular node and left inguinal node.    She is seen today with family members.  She states that her main complaints of abdominal pain are  intermittent.  She was taking Norco in the past with relief.  Her main complaints are actually related to shortness of breath.  Over the last several months her breathing has worsened to the point where she can move only minimally and spends a majority of the day either in the bed or in the chair.  She gets out of breath even with minimal exertion.  She denies any chest pain. She does carry a history of CHF in the past but has not seen cardiology for several years and she was previously on oxygen therapy.  Her weight has been stable.  She does have some early satiety.  She is without other new complaints.          Past Medical History:   Past Medical History:   Diagnosis Date    Arthritis     Cancer associated pain 10/9/2019    CHF (congestive heart failure)     CKD (chronic kidney disease)     Diabetes mellitus, type 2     DM (diabetes mellitus)     GERD (gastroesophageal reflux disease)     HTN (hypertension)     Hyperlipidemia     Malignant poorly differentiated neuroendocrine carcinoma 10/9/2019    Primary malignant neuroendocrine neoplasm of pancreas        Past Surgical HIstory:   Past Surgical History:   Procedure Laterality Date    ENDOSCOPIC ULTRASOUND OF UPPER GASTROINTESTINAL TRACT Left 9/23/2019    Procedure: EUS with core biopsy;  Surgeon: Michel Smart MD;  Location: [facility];  Service: Endoscopy;  Laterality: Left;    HYSTERECTOMY         Family History:   Family History   Problem Relation Age of Onset    Cancer Sister     Cancer Maternal Uncle     Cancer Cousin        Social History:  reports that she has never smoked. She has never used smokeless tobacco. She reports that she does not drink alcohol or use drugs.    Allergies:  Review of patient's allergies indicates:  No Known Allergies    Medications:  Current Outpatient Medications   Medication Sig Dispense Refill    albuterol (PROVENTIL) 2.5 mg /3 mL (0.083 %) nebulizer solution Take 2.5 mg by nebulization every 6 (six) hours  "as needed for Wheezing. Rescue      alendronate (FOSAMAX) 70 MG tablet Take 70 mg by mouth every 7 days.      amitriptyline (ELAVIL) 25 MG tablet Take 25 mg by mouth nightly as needed for Insomnia.      aspirin 325 MG tablet Take 325 mg by mouth once daily.       atorvastatin (LIPITOR) 40 MG tablet Take 40 mg by mouth.      blood sugar diagnostic (BLOOD GLUCOSE TEST) Strp by Misc.(Non-Drug; Combo Route) route.      BLOOD-GLUCOSE METER MISC by Misc.(Non-Drug; Combo Route) route.      budesonide-formoterol 160-4.5 mcg (SYMBICORT) 160-4.5 mcg/actuation HFAA Inhale 2 puffs into the lungs every 12 (twelve) hours. Controller      calcitRIOL (ROCALTROL) 0.5 MCG Cap Take 0.5 mcg by mouth once daily.      carvedilol (COREG) 12.5 MG tablet Take 1 tablet (12.5 mg total) by mouth 2 (two) times daily with meals. 60 tablet 11    diclofenac-misoprostol  mg-mcg (ARTHROTEC 75)  mg-mcg per tablet Take 1 tablet by mouth 2 (two) times daily.      ergocalciferol (ERGOCALCIFEROL) 50,000 unit Cap Take 50,000 Units by mouth Daily.       ferrous sulfate 324 mg (65 mg iron) TbEC Take 325 mg by mouth once daily.      fluticasone propionate (FLONASE) 50 mcg/actuation nasal spray SHAKE LQ AND U 2 SPRAYS IEN QD  5    furosemide (LASIX) 20 MG tablet Take 20 mg by mouth 2 (two) times daily.      hydrALAZINE (APRESOLINE) 10 MG tablet Take 10 mg by mouth 3 (three) times daily.      HYDROcodone-acetaminophen (NORCO) 5-325 mg per tablet Take 1 tablet by mouth every 6 (six) hours as needed for Pain.      ipratropium (ATROVENT) 0.02 % nebulizer solution Take 500 mcg by nebulization 4 (four) times daily. Rescue      linaGLIPtin (TRADJENTA) 5 mg Tab tablet Take 5 mg by mouth once daily.      losartan (COZAAR) 100 MG tablet Take 100 mg by mouth once daily.      NIFEdipine (ADALAT CC) 30 MG TbSR Take 30 mg by mouth once daily.      pen needle, diabetic (NOVOFINE 30) 30 gauge x 1/3" Ndle by Misc.(Non-Drug; Combo Route) route. " "     ranitidine (ZANTAC) 150 MG capsule Take 150 mg by mouth 2 (two) times daily as needed.       spironolactone (ALDACTONE) 25 MG tablet Take 1 tablet (25 mg total) by mouth once daily. 30 tablet 11    TRESIBA FLEXTOUCH U-100 100 unit/mL (3 mL) InPn TAKE 20 UNITS  EVERY MORNING  3    valsartan (DIOVAN) 320 MG tablet Take 320 mg by mouth once daily.      HYDROcodone-acetaminophen (NORCO) 5-325 mg per tablet Take 1 tablet by mouth every 6 (six) hours as needed for Pain. 50 tablet 0     No current facility-administered medications for this visit.        Review of Systems   Constitutional: Positive for activity change and appetite change. Negative for chills, fever and unexpected weight change.   HENT: Negative for congestion, hearing loss and nosebleeds.    Eyes: Negative for visual disturbance.   Respiratory: Positive for shortness of breath. Negative for cough.    Cardiovascular: Negative for chest pain and palpitations.   Gastrointestinal: Positive for abdominal pain. Negative for blood in stool, constipation, diarrhea, nausea and vomiting.   Genitourinary: Negative for dysuria.   Musculoskeletal: Positive for arthralgias. Negative for back pain and gait problem.   Skin: Negative for color change and rash.   Neurological: Negative for dizziness, weakness and headaches.   Hematological: Negative for adenopathy. Does not bruise/bleed easily.   Psychiatric/Behavioral: Negative for confusion.       ECOG Performance Status: 3   Objective:      Vitals:   Vitals:    10/09/19 1114   BP: (!) 122/58   BP Location: Right arm   Patient Position: Sitting   BP Method: Medium (Automatic)   Pulse: 75   Temp: 98.8 °F (37.1 °C)   TempSrc: Oral   SpO2: 96%   Weight: 70.7 kg (155 lb 12.1 oz)   Height: 5' 5" (1.651 m)     BMI: Body mass index is 25.92 kg/m².    Physical Exam   Constitutional: She is oriented to person, place, and time. She appears well-developed and well-nourished. No distress.   Sitting in a wheelchair, requires " maximal assistance to ambulate to exam table   HENT:   Head: Normocephalic.   Mouth/Throat: No oropharyngeal exudate.   Eyes: EOM are normal. No scleral icterus.   Neck: Neck supple. No tracheal deviation present. No thyromegaly present.   Cardiovascular: Normal rate and regular rhythm.   Murmur heard.  Pulmonary/Chest: Breath sounds normal. She is in respiratory distress. She has no wheezes. She has no rales.   Labored breathing following minimal exertion   Abdominal: Soft. She exhibits no distension and no mass. There is no tenderness. There is no rebound and no guarding.   Musculoskeletal: Normal range of motion. She exhibits no edema.   Lymphadenopathy:     She has no cervical adenopathy.   Neurological: She is alert and oriented to person, place, and time. No cranial nerve deficit.   Skin: Skin is warm and dry.   Psychiatric: She has a normal mood and affect.       Laboratory Data:  Abstract on 10/08/2019   Component Date Value Ref Range Status    EXT CEA 09/11/2019 5.4*  Final    EXT CA 19-9 09/11/2019 81*  Final    EXT WBC 09/11/2019 9.03   Final    EXT Hemoglobin 09/11/2019 10.4*  Final    EXT Hematocrit 09/11/2019 32.7*  Final    EXT Platelets 09/11/2019 287   Final    EXT Glucose 09/11/2019 164*  Final    EXT BUN 09/11/2019 33*  Final    EXT Creatinine 09/11/2019 2.1* mg/dL Final    EXT Sodium 09/11/2019 139  mmol/L Final    EXT Potassium 09/11/2019 3.1*  Final    EXT Chloride 09/11/2019 95*  Final    EXT CO2 09/11/2019 32   Final    EXT Calcium 09/11/2019 8.3*  Final    EXT Protein total 09/11/2019 7.5   Final    EXT Albumin 09/11/2019 3.3*  Final    EXT BilirubiN Total 09/11/2019 0.4   Final    EXT Alkaline Phosphatase 09/11/2019 284*  Final    EXT AST 09/11/2019 33   Final    EXT ALT 09/11/2019 23   Final   Hospital Outpatient Visit on 10/03/2019   Component Date Value Ref Range Status    POCT Glucose 10/03/2019 248* 70 - 110 mg/dL Final    POCT Glucose 10/04/2019 98  70 - 110  mg/dL Final   Lab Visit on 10/03/2019   Component Date Value Ref Range Status    WBC 10/03/2019 12.09  3.90 - 12.70 K/uL Final    RBC 10/03/2019 4.12  4.00 - 5.40 M/uL Final    Hemoglobin 10/03/2019 10.8* 12.0 - 16.0 g/dL Final    Hematocrit 10/03/2019 33.7* 37.0 - 48.5 % Final    Mean Corpuscular Volume 10/03/2019 82  82 - 98 fL Final    Mean Corpuscular Hemoglobin 10/03/2019 26.2* 27.0 - 31.0 pg Final    Mean Corpuscular Hemoglobin Conc 10/03/2019 32.0  32.0 - 36.0 g/dL Final    RDW 10/03/2019 16.2* 11.5 - 14.5 % Final    Platelets 10/03/2019 320  150 - 350 K/uL Final    MPV 10/03/2019 10.8  9.2 - 12.9 fL Final    Gran # (ANC) 10/03/2019 9.1* 1.8 - 7.7 K/uL Final    Lymph # 10/03/2019 1.7  1.0 - 4.8 K/uL Final    Mono # 10/03/2019 0.8  0.3 - 1.0 K/uL Final    Eos # 10/03/2019 0.4  0.0 - 0.5 K/uL Final    Baso # 10/03/2019 0.02  0.00 - 0.20 K/uL Final    Gran% 10/03/2019 75.8* 38.0 - 73.0 % Final    Lymph% 10/03/2019 13.7* 18.0 - 48.0 % Final    Mono% 10/03/2019 6.9  4.0 - 15.0 % Final    Eosinophil% 10/03/2019 3.4  0.0 - 8.0 % Final    Basophil% 10/03/2019 0.2  0.0 - 1.9 % Final    Differential Method 10/03/2019 Automated   Final    Sodium 10/03/2019 138  136 - 145 mmol/L Final    Potassium 10/03/2019 3.8  3.5 - 5.1 mmol/L Final    Chloride 10/03/2019 96  95 - 110 mmol/L Final    CO2 10/03/2019 27  23 - 29 mmol/L Final    Glucose 10/03/2019 242* 70 - 110 mg/dL Final    BUN, Bld 10/03/2019 44* 8 - 23 mg/dL Final    Creatinine 10/03/2019 2.1* 0.5 - 1.4 mg/dL Final    Calcium 10/03/2019 9.9  8.7 - 10.5 mg/dL Final    Total Protein 10/03/2019 9.3* 6.0 - 8.4 g/dL Final    Albumin 10/03/2019 3.3* 3.5 - 5.2 g/dL Final    Total Bilirubin 10/03/2019 0.7  0.1 - 1.0 mg/dL Final    Comment: For infants and newborns, interpretation of results should be based  on gestational age, weight and in agreement with clinical  observations.  Premature Infant recommended reference ranges:  Up to 24  hours.............<8.0 mg/dL  Up to 48 hours............<12.0 mg/dL  3-5 days..................<15.0 mg/dL  6-29 days.................<15.0 mg/dL      Alkaline Phosphatase 10/03/2019 319* 55 - 135 U/L Final    AST 10/03/2019 57* 10 - 40 U/L Final    ALT 10/03/2019 44  10 - 44 U/L Final    Anion Gap 10/03/2019 15  8 - 16 mmol/L Final    eGFR if African American 10/03/2019 25* >60 mL/min/1.73 m^2 Final    eGFR if non African American 10/03/2019 22* >60 mL/min/1.73 m^2 Final    Comment: Calculation used to obtain the estimated glomerular filtration  rate (eGFR) is the CKD-EPI equation.      No results displayed because visit has over 200 results.      Abstract on 09/13/2019   Component Date Value Ref Range Status    EXT WBC 08/22/2019 11.1* 4.8 - 10.8 k/ul Final    EXT Hemoglobin 08/22/2019 11.2* 12.0 - 16.0 gm/dl Final    EXT Hematocrit 08/22/2019 35.0* 37 - 47 % Final    EXT Platelets 08/22/2019 293  140 - 450 k/ul Final    EXT Glucose 08/22/2019 88  74 - 106 mg/dl Final    EXT BUN 08/22/2019 32* 7 - 18 mg/dl Final    EXT Creatinine 08/22/2019 1.9* 0.6 - 1.3 mg/dL Final    EXT Sodium 08/22/2019 140  136 - 145 mmol/L Final    EXT Potassium 08/22/2019 4.2  3.5 - 5.2 mmol/l Final    EXT CO2 08/22/2019 30  21 - 32 mmol/l Final    EXT Calcium 08/22/2019 9.9  9.5 - 10.1 mg/dl Final    EXT Protein total 08/22/2019 8.8* 6.4 - 8.2 g/dl Final    EXT BilirubiN Total 08/22/2019 0.4  0.2 - 1.0 mg/dl Final    EXT Alkaline Phosphatase 08/22/2019 292* 46 - 116 iu/l Final    EXT AST 08/22/2019 26  15 - 37 iu/l Final    EXT ALT 08/22/2019 38  12 - 78 iu/l Final    EXT Lipase 08/22/2019 222  73 - 393 iu/l Final       FINAL PATHOLOGIC DIAGNOSIS  Lymph node, richard hepatis, biopsy:  - Poorly differentiated neuroendocrine carcinoma.  - No lymph node definitively identified.  - See comment.  COMMENT: The biopsy shows a poorly differentiated epithelioid neoplasm with necrosis . There is no lymphoid  background  identified. Immunostains show the tumor cells to be positive for synaptophysin and chromogranin and  negative for CK7. Proliferation index by Ki-67 is approximately 90%. There are no definitive features of small cell  or large cell carcinoma, therefore this is best classified as a poorly differentiated neuroendocrine carcinoma . Case  is reviewed by Dr. NOMAN Segura who agrees with the above diagnosis. There is no lymph-vascular seen on immunostain  for CD 31. Appropriate positive controls are examined.  Diagnosed by: Felipa Edwards M.D.  (Electronically Signed: 2019-09-26 15:05:04)    Imaging:   FDG PET/CT 10/4/19    COMPARISON:  None    FINDINGS:  Quality of the study: Adequate.    There are multiple foci of abnormal tracer uptake with index lesions as follows:    Lesion 1: Left supraclavicular lymph node, this shows max SUV of 5.0.    Lesion 2: Left periaortic abdominal mallory conglomerate at the level of the inferior pole of the left kidney, max SUV 6.8.    Lesion 3: Left external iliac lymph node, max SUV 4.6.    Lesion 4: Periportal lymph node versus pancreatic head lesion, max SUV 10.9.    Transmission CT images show left basilar atelectasis versus infiltrate.  The patient has a internal common bile duct stent.      Impression       There are PET-CT findings of metastatic disease involving lymph nodes throughout the chest, abdomen and pelvis as described above.       Advance Care Planning     Power of   I initiated the process of advance care planning today and explained the importance of this process to the patient.  I introduced the concept of advance directives to the patient, as well. Then the patient received detailed information about the importance of designating a Health Care Power of  (HCPOA). She was also instructed to communicate with this person about their wishes for future healthcare, should she become sick and lose decision-making capacity. The patient has not previously appointed a  HCPOA. After our discussion, the patient has not decided to complete a HCPOA but will discuss with family members.               Assessment:       1. Secondary malignant neoplasm of intra-abdominal lymph nodes    2. Malignant poorly differentiated neuroendocrine carcinoma    3. Cancer associated pain           Plan:     I have had a long conversation with Mrs. Martel and family members today concerning her disease.  She has a poorly differentiated neuroendocrine carcinoma with evidence of metastatic disease seen on PET-CT.  She understands that her disease is incurable and that any treatment is directed towards palliation and potential prolongation of life.  Unfortunately, she has very poor performance status, mainly secondary to her shortness of breath.  She does carry a diagnosis of CHF but has not seen cardiology for some time and review of her recent echocardiogram showed a preserved ejection fraction.  Her shortness of breath is the main limiting factor for treatment decisions.  I would generally start out with a combination carboplatin and etoposide, however, this does carry significant side effects and given her very poor performance status I do not think she would tolerate well. At this point, I have recommended we consult Cardiology to see if there are any reversible causes to her shortness of breath.  If there are, then we would consider treatment with systemic chemotherapy.  Will also plan to check a V/Q scan.  She cannot have a CT angiogram secondary to her chronic kidney disease.  Ultimately, if her performance status cannot be improved then hospice would be a consideration and I have discussed this with her.  I will plan to see her back in another 2 weeks and hopefully she has been seen by cardiology at that time and will make further treatment recommendations than.  I have refilled her Norco.  Multiple questions were answered and she is agreeable with this plan.      Rangel Antony DO,  [facility]  Hematology & Oncology, [facility]  [address]  [place]  [address]  ph. [phone]; [phone]  fax. [phone]      60 minutes were spent in coordination of patient's care, record review and counseling.  More than 50% of the time was face-to-face.

## 2019-10-09 NOTE — PATIENT INSTRUCTIONS
Carboplatin injection  What is this medicine?  CARBOPLATIN (MASOUD aryan bhupendra tin) is a chemotherapy drug. It targets fast dividing cells, like cancer cells, and causes these cells to die. This medicine is used to treat ovarian cancer and many other cancers.  How should I use this medicine?  This drug is usually given as an infusion into a vein. It is administered in a hospital or clinic by a specially trained health care professional.  Talk to your pediatrician regarding the use of this medicine in children. Special care may be needed.  What side effects may I notice from receiving this medicine?  Side effects that you should report to your doctor or health care professional as soon as possible:  · allergic reactions like skin rash, itching or hives, swelling of the face, lips, or tongue  · signs of infection - fever or chills, cough, sore throat, pain or difficulty passing urine  · signs of decreased platelets or bleeding - bruising, pinpoint red spots on the skin, black, tarry stools, nosebleeds  · signs of decreased red blood cells - unusually weak or tired, fainting spells, lightheadedness  · breathing problems  · changes in hearing  · changes in vision  · chest pain  · high blood pressure  · low blood counts - This drug may decrease the number of white blood cells, red blood cells and platelets. You may be at increased risk for infections and bleeding.  · nausea and vomiting  · pain, swelling, redness or irritation at the injection site  · pain, tingling, numbness in the hands or feet  · problems with balance, talking, walking  · trouble passing urine or change in the amount of urine  Side effects that usually do not require medical attention (report to your doctor or health care professional if they continue or are bothersome):  · hair loss  · loss of appetite  · metallic taste in the mouth or changes in taste  What may interact with this medicine?  · medicines for seizures  · medicines to increase blood counts  like filgrastim, pegfilgrastim, sargramostim  · some antibiotics like amikacin, gentamicin, neomycin, streptomycin, tobramycin  · vaccines  Talk to your doctor or health care professional before taking any of these medicines:  · acetaminophen  · aspirin  · ibuprofen  · ketoprofen  · naproxen  What if I miss a dose?  It is important not to miss a dose. Call your doctor or health care professional if you are unable to keep an appointment.  Where should I keep my medicine?  This drug is given in a hospital or clinic and will not be stored at home.  What should I tell my health care provider before I take this medicine?  They need to know if you have any of these conditions:  · blood disorders  · hearing problems  · kidney disease  · recent or ongoing radiation therapy  · an unusual or allergic reaction to carboplatin, cisplatin, other chemotherapy, other medicines, foods, dyes, or preservatives  · pregnant or trying to get pregnant  · breast-feeding  What should I watch for while using this medicine?  Your condition will be monitored carefully while you are receiving this medicine. You will need important blood work done while you are taking this medicine.  This drug may make you feel generally unwell. This is not uncommon, as chemotherapy can affect healthy cells as well as cancer cells. Report any side effects. Continue your course of treatment even though you feel ill unless your doctor tells you to stop.  In some cases, you may be given additional medicines to help with side effects. Follow all directions for their use.  Call your doctor or health care professional for advice if you get a fever, chills or sore throat, or other symptoms of a cold or flu. Do not treat yourself. This drug decreases your body's ability to fight infections. Try to avoid being around people who are sick.  This medicine may increase your risk to bruise or bleed. Call your doctor or health care professional if you notice any unusual  bleeding.  Be careful brushing and flossing your teeth or using a toothpick because you may get an infection or bleed more easily. If you have any dental work done, tell your dentist you are receiving this medicine.  Avoid taking products that contain aspirin, acetaminophen, ibuprofen, naproxen, or ketoprofen unless instructed by your doctor. These medicines may hide a fever.  Do not become pregnant while taking this medicine. Women should inform their doctor if they wish to become pregnant or think they might be pregnant. There is a potential for serious side effects to an unborn child. Talk to your health care professional or pharmacist for more information. Do not breast-feed an infant while taking this medicine.  NOTE:This sheet is a summary. It may not cover all possible information. If you have questions about this medicine, talk to your doctor, pharmacist, or health care provider. Copyright© 2017 [employer]        Etoposide, -16 injection  What is this medicine?  ETOPOSIDE, -16 (e toe ELDON side) is a chemotherapy drug. It is used to treat testicular cancer, lung cancer, and other cancers.  How should I use this medicine?  This medicine is for infusion into a vein. It is administered in a hospital or clinic by a specially trained health care professional.  Talk to your pediatrician regarding the use of this medicine in children. Special care may be needed.  What side effects may I notice from receiving this medicine?  Side effects that you should report to your doctor or health care professional as soon as possible:  · allergic reactions like skin rash, itching or hives, swelling of the face, lips, or tongue  · low blood counts - this medicine may decrease the number of white blood cells, red blood cells and platelets. You may be at increased risk for infections and bleeding.  · signs of infection - fever or chills, cough, sore throat, pain or difficulty passing urine  · signs of decreased platelets or  bleeding - bruising, pinpoint red spots on the skin, black, tarry stools, blood in the urine  · signs of decreased red blood cells - unusually weak or tired, fainting spells, lightheadedness  · breathing problems  · changes in vision  · mouth or throat sores or ulcers  · pain, redness, swelling or irritation at the injection site  · pain, tingling, numbness in the hands or feet  · redness, blistering, peeling or loosening of the skin, including inside the mouth  · seizures  · vomiting  Side effects that usually do not require medical attention (report to your doctor or health care professional if they continue or are bothersome):  · diarrhea  · hair loss  · loss of appetite  · nausea  · stomach pain  What may interact with this medicine?  · aspirin  · certain medications for seizures like carbamazepine, phenobarbital, phenytoin, valproic acid  · cyclosporine  · levamisole  · warfarin  What if I miss a dose?  It is important not to miss your dose. Call your doctor or health care professional if you are unable to keep an appointment.  Where should I keep my medicine?  This drug is given in a hospital or clinic and will not be stored at home.  What should I tell my health care provider before I take this medicine?  They need to know if you have any of these conditions:  · infection  · kidney disease  · low blood counts, like low white cell, platelet, or red cell counts  · an unusual or allergic reaction to etoposide, other chemotherapeutic agents, other medicines, foods, dyes, or preservatives  · pregnant or trying to get pregnant  · breast-feeding  What should I watch for while using this medicine?  Visit your doctor for checks on your progress. This drug may make you feel generally unwell. This is not uncommon, as chemotherapy can affect healthy cells as well as cancer cells. Report any side effects. Continue your course of treatment even though you feel ill unless your doctor tells you to stop.  In some cases, you  may be given additional medicines to help with side effects. Follow all directions for their use.  Call your doctor or health care professional for advice if you get a fever, chills or sore throat, or other symptoms of a cold or flu. Do not treat yourself. This drug decreases your body's ability to fight infections. Try to avoid being around people who are sick.  This medicine may increase your risk to bruise or bleed. Call your doctor or health care professional if you notice any unusual bleeding.  Be careful brushing and flossing your teeth or using a toothpick because you may get an infection or bleed more easily. If you have any dental work done, tell your dentist you are receiving this medicine.  Avoid taking products that contain aspirin, acetaminophen, ibuprofen, naproxen, or ketoprofen unless instructed by your doctor. These medicines may hide a fever.  Do not become pregnant while taking this medicine or for at least 6 months after stopping it. Women should inform their doctor if they wish to become pregnant or think they might be pregnant. Women of child-bearing potential will need to have a negative pregnancy test before starting this medicine. There is a potential for serious side effects to an unborn child. Talk to your health care professional or pharmacist for more information. Do not breast-feed an infant while taking this medicine.  Men must use a latex condom during sexual contact with a woman while taking this medicine and for at least 4 months after stopping it. A latex condom is needed even if you have had a vasectomy. Contact your doctor right away if your partner becomes pregnant. Do not donate sperm while taking this medicine and for at least 4 months after you stop taking this medicine. Men should inform their doctors if they wish to father a child. This medicine may lower sperm counts.  NOTE:This sheet is a summary. It may not cover all possible information. If you have questions about this  medicine, talk to your doctor, pharmacist, or health care provider. Copyright© 2017 [employer]

## 2019-10-14 ENCOUNTER — OFFICE VISIT (OUTPATIENT)
Dept: CARDIOLOGY | Facility: CLINIC | Age: 79
End: 2019-10-14
Payer: MEDICARE

## 2019-10-14 ENCOUNTER — HOSPITAL ENCOUNTER (OUTPATIENT)
Dept: RADIOLOGY | Facility: HOSPITAL | Age: 79
Discharge: HOME | End: 2019-10-14
Attending: INTERNAL MEDICINE
Payer: MEDICARE

## 2019-10-14 VITALS
HEART RATE: 95 BPM | SYSTOLIC BLOOD PRESSURE: 143 MMHG | OXYGEN SATURATION: 99 % | DIASTOLIC BLOOD PRESSURE: 77 MMHG | WEIGHT: 158.06 LBS | BODY MASS INDEX: 26.3 KG/M2

## 2019-10-14 DIAGNOSIS — C7A.1 MALIGNANT POORLY DIFFERENTIATED NEUROENDOCRINE CARCINOMA: ICD-10-CM

## 2019-10-14 DIAGNOSIS — R06.09 DYSPNEA ON EXERTION: ICD-10-CM

## 2019-10-14 DIAGNOSIS — E11.22 HYPERTENSION ASSOCIATED WITH STAGE 3 CHRONIC KIDNEY DISEASE DUE TO TYPE 2 DIABETES MELLITUS: ICD-10-CM

## 2019-10-14 DIAGNOSIS — G89.3 CANCER ASSOCIATED PAIN: ICD-10-CM

## 2019-10-14 DIAGNOSIS — I50.32 CHRONIC DIASTOLIC CONGESTIVE HEART FAILURE: Primary | ICD-10-CM

## 2019-10-14 DIAGNOSIS — N18.30 TYPE 2 DIABETES MELLITUS WITH STAGE 3 CHRONIC KIDNEY DISEASE, WITH LONG-TERM CURRENT USE OF INSULIN: ICD-10-CM

## 2019-10-14 DIAGNOSIS — N18.30 HYPERTENSION ASSOCIATED WITH STAGE 3 CHRONIC KIDNEY DISEASE DUE TO TYPE 2 DIABETES MELLITUS: ICD-10-CM

## 2019-10-14 DIAGNOSIS — C77.2 SECONDARY MALIGNANT NEOPLASM OF INTRA-ABDOMINAL LYMPH NODES: ICD-10-CM

## 2019-10-14 DIAGNOSIS — I12.9 HYPERTENSION ASSOCIATED WITH STAGE 3 CHRONIC KIDNEY DISEASE DUE TO TYPE 2 DIABETES MELLITUS: ICD-10-CM

## 2019-10-14 DIAGNOSIS — Z79.4 TYPE 2 DIABETES MELLITUS WITH STAGE 3 CHRONIC KIDNEY DISEASE, WITH LONG-TERM CURRENT USE OF INSULIN: ICD-10-CM

## 2019-10-14 DIAGNOSIS — C25.1 MALIGNANT NEOPLASM OF BODY OF PANCREAS: ICD-10-CM

## 2019-10-14 DIAGNOSIS — E11.22 TYPE 2 DIABETES MELLITUS WITH STAGE 3 CHRONIC KIDNEY DISEASE, WITH LONG-TERM CURRENT USE OF INSULIN: ICD-10-CM

## 2019-10-14 PROCEDURE — 99999 PR PBB SHADOW E&M-EST. PATIENT-LVL V: CPT | Mod: PBBFAC,,, | Performed by: INTERNAL MEDICINE

## 2019-10-14 PROCEDURE — 99204 PR OFFICE/OUTPT VISIT, NEW, LEVL IV, 45-59 MIN: ICD-10-PCS | Mod: S$PBB,,, | Performed by: INTERNAL MEDICINE

## 2019-10-14 PROCEDURE — 99204 OFFICE O/P NEW MOD 45 MIN: CPT | Mod: S$PBB,,, | Performed by: INTERNAL MEDICINE

## 2019-10-14 PROCEDURE — 99215 OFFICE O/P EST HI 40 MIN: CPT | Mod: PBBFAC,PO,25 | Performed by: INTERNAL MEDICINE

## 2019-10-14 PROCEDURE — 99999 PR PBB SHADOW E&M-EST. PATIENT-LVL V: ICD-10-PCS | Mod: PBBFAC,,, | Performed by: INTERNAL MEDICINE

## 2019-10-14 PROCEDURE — 78582 NM LUNG VENTILATION AND PERFUSION IMAGING: ICD-10-PCS | Mod: 26,,, | Performed by: RADIOLOGY

## 2019-10-14 PROCEDURE — A9540 TC99M MAA: HCPCS

## 2019-10-14 PROCEDURE — 78582 LUNG VENTILAT&PERFUS IMAGING: CPT | Mod: 26,,, | Performed by: RADIOLOGY

## 2019-10-14 NOTE — PATIENT INSTRUCTIONS
Established High Blood Pressure    High blood pressure (hypertension) is a chronic disease. Often, healthcare providers dont know what causes it. But it can be caused by certain health conditions and medicines.  If you have high blood pressure, you may not have any symptoms. If you do have symptoms, they may include headache, dizziness, changes in your vision, chest pain, and shortness of breath. But even without symptoms, high blood pressure thats not treated raises your risk for heart attack and stroke. High blood pressure is a serious health risk and shouldnt be ignored.  A blood pressure reading is made up of two numbers: a higher number over a lower number. The top number is the systolic pressure. The bottom number is the diastolic pressure. A normal blood pressure is a systolic pressure of  less than 120 over a diastolic pressure of less than 80. You will see your blood pressure readings written together. For example, a person with a systolic pressure of 188 and a diastolic pressure of 78 will have 118/78 written in the medical record.  High blood pressure is when either the top number is 140 or higher, or the bottom number is 90 or higher. This must be the result when taking your blood pressure a number of times. The blood pressures between normal and high are called prehypertension.  Home care  If you have high blood pressure, you should do what is listed below to lower your blood pressure. If you are taking medicines for high blood pressure, these methods may reduce or end your need for medicines in the future.  · Begin a weight-loss program if you are overweight.  · Cut back on how much salt you get in your diet. Heres how to do this:  ¨ Dont eat foods that have a lot of salt. These include olives, pickles, smoked meats, and salted potato chips.  ¨ Dont add salt to your food at the table.  ¨ Use only small amounts of salt when cooking.  · Start an exercise program. Talk with your healthcare  provider about the type of exercise program that would be best for you. It doesn't have to be hard. Even brisk walking for 20 minutes 3 times a week is a good form of exercise.  · Dont take medicines that stimulate the heart. This includes many over-the-counter cold and sinus decongestant pills and sprays, as well as diet pills. Check the warnings about hypertension on the label. Before buying any over-the-counter medicines or supplements, always ask the pharmacist about the product's potential interaction with your high blood pressure and your high blood pressure medicines.  · Stimulants such as amphetamine or cocaine could be deadly for someone with high blood pressure. Never take these.  · Limit how much caffeine you get in your diet. Switch to caffeine-free products.  · Stop smoking. If you are a long-time smoker, this can be hard. Talk to your healthcare provider about medicines and nicotine replacement options to help you. Also, enroll in a stop-smoking program to make it more likely that you will quit for good.  · Learn how to handle stress. This is an important part of any program to lower blood pressure. Learn about relaxation methods like meditation, yoga, or biofeedback.  · If your provider prescribed medicines, take them exactly as directed. Missing doses may cause your blood pressure get out of control.  · If you miss a dose or doses, check with your healthcare provider or pharmacist about what to do.  · Consider buying an automatic blood pressure machine. Ask your provider for a recommendation. You can get one of these at most pharmacies.     The American Heart Association recommends the following guidelines for home blood pressure monitoring:  · Don't smoke or drink coffee for 30 minutes before taking your blood pressure.  · Go to the bathroom before the test.  · Relax for 5 minutes before taking the measurement.  · Sit with your back supported (don't sit on a couch or soft chair); keep your feet on  the floor uncrossed. Place your arm on a solid flat surface (like a table) with the upper part of the arm at heart level. Place the middle of the cuff directly above the eye of the elbow. Check the monitor's instruction manual for an illustration.  · Take multiple readings. When you measure, take 2 to 3 readings one minute apart and record all of the results.  · Take your blood pressure at the same time every day, or as your healthcare provider recommends.  · Record the date, time, and blood pressure reading.  · Take the record with you to your next medical appointment. If your blood pressure monitor has a built-in memory, simply take the monitor with you to your next appointment.  · Call your provider if you have several high readings. Don't be frightened by a single high blood pressure reading, but if you get several high readings, check in with your healthcare provider.  · Note: When blood pressure reaches a systolic (top number) of 180 or higher OR diastolic (bottom number) of 110 or higher, seek emergency medical treatment.  Follow-up care  You will need to see your healthcare provider regularly. This is to check your blood pressure and to make changes to your medicines. Make a follow-up appointment as directed. Bring the record of your home blood pressure readings to the appointment.  When to seek medical advice  Call your healthcare provider right away if any of these occur:  · Blood pressure reaches a systolic (upper number) of 180 or higher OR a diastolic (bottom number) of 110 or higher  · Chest pain or shortness of breath  · Severe headache  · Throbbing or rushing sound in the ears  · Nosebleed  · Sudden severe pain in your belly (abdomen)  · Extreme drowsiness, confusion, or fainting  · Dizziness or spinning sensation (vertigo)  · Weakness of an arm or leg or one side of the face  · You have problems speaking or seeing   Date Last Reviewed: 12/1/2016  © 3282-0804 [employer]. [address] [place] [address]  [place] [address]. All rights reserved. This information is not intended as a substitute for professional medical care. Always follow your healthcare professional's instructions.

## 2019-10-14 NOTE — LETTER
October 14, 2019      Rangel Antony DO, FACP  [address] [facility] [address]           [facility]  [address] [place] [address] [address]  [facility] [address]  Phone: [phone]          Patient: Nidia Martel   MR Number: [id number]   YOB: 1940   Date of Visit: 10/14/2019       Dear Dr. Rangel Antony:    Thank you for referring Nidia Martel to me for evaluation. Attached you will find relevant portions of my assessment and plan of care.    If you have questions, please do not hesitate to call me. I look forward to following Nidia Martel along with you.    Sincerely,    Shant Ricci MD    Enclosure  CC:  No Recipients    If you would like to receive this communication electronically, please contact [email or web address] or [phone] to request more information on [facility] Link access.    For providers and/or their staff who would like to refer a patient to [facility], please contact us through our one-stop-shop provider referral line, [facility] , at [phone].    If you feel you have received this communication in error or would no longer like to receive these types of communications, please e-mail [email or web address]

## 2019-10-14 NOTE — PROGRESS NOTES
Subjective:   @Patient ID:  Nidia Martel is a 79 y.o. female who presents for evaluation of heart failure    HPI:   Patient recently diagnosed with Neuroendocrine pancreatic CA.   She has hx of heart failure and reports significant Dyspnea with exertion with minimal distance. Patient was admitted 2 weeks ago with HF decompensation.   Echo was done which showed normal LV systolic function and  Diastolic dysfunction. Patient has small pericardial effusion.  She stated that she was taken off the lasix and was rx spironolactone. Also she was taken off hydralazine.  She accompanied by her son today. She is scheduled for V/Q scan this afternoon.  Stated that she had stress test many years ago that was ok.     She was evaluated by Oncology team and was referred to evaluate possible reversible causes of dyspnea.     Pertinent medical problems: DM, CKD, pancreatic CA, HTN.     Prior cardiovascular  Hx  --------------------------------     - ECHO 9/2019 EF 60%, elevated filling pressure.     - EKG SR, RBBB, no ischemic changes.       Patient Active Problem List    Diagnosis Date Noted    Dyspnea on exertion 10/14/2019    Malignant poorly differentiated neuroendocrine carcinoma 10/09/2019    Cancer associated pain 10/09/2019    Secondary hyperparathyroidism of renal origin 09/26/2019    Diabetic nephropathy 09/26/2019    Malignant neoplasm of body of pancreas 09/25/2019    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    Malnutrition compromising bodily function 09/22/2019    Renal insufficiency 09/21/2019    Type 2 diabetes mellitus with chronic kidney disease, with long-term current use of insulin 09/21/2019    Obesity due to excess calories with serious comorbidity 09/21/2019    Hypertension due to endocrine disorder 09/21/2019    Pancreatic mass 09/20/2019     She has a pancreatic mass noted on a noncontrast CT scan for which I only have a report.  The biopsy is based on an endoscopic  ultrasound of which I do not have a description of the procedure but I have a pathology report that is suggestive of a high-grade pancreatic primary.  The report of the CT scan suggests a mass in the richard hepatis contiguous with the pancreatic parenchyma.  A lack of contrast would make resectability impossible to determine.      Congestive heart failure 09/20/2019                    LAST HbA1c  Lab Results   Component Value Date    HGBA1C 6.2 (H) 09/25/2019       Lipid panel    Review of Systems   Constitution: Negative for chills and fever.   HENT: Negative for hearing loss and nosebleeds.    Eyes: Negative for blurred vision.   Cardiovascular:        As noted in HPI   Respiratory: Positive for shortness of breath. Negative for hemoptysis.    Hematologic/Lymphatic: Negative for bleeding problem.   Skin: Negative for itching.   Musculoskeletal: Positive for arthritis.   Gastrointestinal: Positive for hematochezia.        One time in the hospital hematochezia   Genitourinary: Negative for hematuria.   Neurological: Negative for dizziness and loss of balance.   Psychiatric/Behavioral: Negative for altered mental status and depression.       Objective:   Physical Exam   Constitutional: She is oriented to person, place, and time. She appears well-developed and well-nourished.   HENT:   Head: Normocephalic and atraumatic.   Eyes: Conjunctivae are normal.   Neck: Neck supple. Carotid bruit is not present.   Cardiovascular: Normal rate, regular rhythm and normal heart sounds. Exam reveals no gallop and no friction rub.   No murmur heard.  Pulmonary/Chest: Effort normal and breath sounds normal. No stridor. No respiratory distress. She has no wheezes.   Neurological: She is alert and oriented to person, place, and time.   Skin: Skin is warm and dry.   Psychiatric: She has a normal mood and affect. Her behavior is normal.       Assessment:     1. Chronic diastolic congestive heart failure    2. Dyspnea on exertion    3.  Type 2 diabetes mellitus with stage 3 chronic kidney disease, with long-term current use of insulin    4. Malignant neoplasm of body of pancreas        Plan:     -  Patient with diastolic dysfunction and elevated filling pressure per last echo, which certainly  could be contributing to here symptoms. Instructed to take lasix along with the spironolactone.  BP is well controlled. Limit salt intake and monitor fluid intake. Monitor response for the diuretics and titrate up as needed.      - V/Q scan ordered. Also will check PFTs to evaluate for any other pulmonary etiology.   - Will see her back after 2 weeks after the PFTs and the V/Q scan.     Pertinent cardiac images and EKG reviewed independently.    Continue with current medical plan and lifestyle changes.  Return sooner for concerns or questions. If symptoms persist go to the ED  I have reviewed all pertinent data including patient's medical history in detail and updated the computerized patient record.     Orders Placed This Encounter   Procedures    Complete PFT w/ bronchodilator     Standing Status:   Future     Standing Expiration Date:   10/14/2020       Follow up as scheduled.     She expressed verbal understanding and agreed with the plan    Patient's Medications   New Prescriptions    No medications on file   Previous Medications    ALBUTEROL (PROVENTIL) 2.5 MG /3 ML (0.083 %) NEBULIZER SOLUTION    Take 2.5 mg by nebulization every 6 (six) hours as needed for Wheezing. Rescue    ALENDRONATE (FOSAMAX) 70 MG TABLET    Take 70 mg by mouth every 7 days.    AMITRIPTYLINE (ELAVIL) 25 MG TABLET    Take 25 mg by mouth nightly as needed for Insomnia.    ASPIRIN 325 MG TABLET    Take 81 mg by mouth once daily.    ATORVASTATIN (LIPITOR) 40 MG TABLET    Take 40 mg by mouth.    BLOOD SUGAR DIAGNOSTIC (BLOOD GLUCOSE TEST) STRP    by Misc.(Non-Drug; Combo Route) route.    BLOOD-GLUCOSE METER MISC    by Misc.(Non-Drug; Combo Route) route.    BUDESONIDE-FORMOTEROL  "160-4.5 MCG (SYMBICORT) 160-4.5 MCG/ACTUATION HFAA    Inhale 2 puffs into the lungs every 12 (twelve) hours. Controller    CALCITRIOL (ROCALTROL) 0.5 MCG CAP    Take 0.5 mcg by mouth once daily.    CARVEDILOL (COREG) 12.5 MG TABLET    Take 1 tablet (12.5 mg total) by mouth 2 (two) times daily with meals.    DICLOFENAC-MISOPROSTOL  MG-MCG (ARTHROTEC 75)  MG-MCG PER TABLET    Take 1 tablet by mouth 2 (two) times daily.    ERGOCALCIFEROL (ERGOCALCIFEROL) 50,000 UNIT CAP    Take 50,000 Units by mouth Daily.     FLUTICASONE PROPIONATE (FLONASE) 50 MCG/ACTUATION NASAL SPRAY    SHAKE LQ AND U 2 SPRAYS IEN QD    FUROSEMIDE (LASIX) 20 MG TABLET    Take 20 mg by mouth 2 (two) times daily.    HYDROCODONE-ACETAMINOPHEN (NORCO) 5-325 MG PER TABLET    Take 1 tablet by mouth every 6 (six) hours as needed for Pain.    IPRATROPIUM (ATROVENT) 0.02 % NEBULIZER SOLUTION    Take 500 mcg by nebulization 4 (four) times daily. Rescue    LINAGLIPTIN (TRADJENTA) 5 MG TAB TABLET    Take 5 mg by mouth once daily.    LOSARTAN (COZAAR) 100 MG TABLET    Take 100 mg by mouth once daily.    NIFEDIPINE (ADALAT CC) 30 MG TBSR    Take 30 mg by mouth once daily.    PEN NEEDLE, DIABETIC (NOVOFINE 30) 30 GAUGE X 1/3" NDLE    by Misc.(Non-Drug; Combo Route) route.    SPIRONOLACTONE (ALDACTONE) 25 MG TABLET    Take 1 tablet (25 mg total) by mouth once daily.    TRESIBA FLEXTOUCH U-100 100 UNIT/ML (3 ML) INPN    TAKE 20 UNITS  EVERY MORNING   Modified Medications    No medications on file   Discontinued Medications    FERROUS SULFATE 324 MG (65 MG IRON) TBEC    Take 325 mg by mouth once daily.    HYDRALAZINE (APRESOLINE) 10 MG TABLET    Take 10 mg by mouth 3 (three) times daily.    HYDROCODONE-ACETAMINOPHEN (NORCO) 5-325 MG PER TABLET    Take 1 tablet by mouth every 6 (six) hours as needed for Pain.    RANITIDINE (ZANTAC) 150 MG CAPSULE    Take 150 mg by mouth 2 (two) times daily as needed.     VALSARTAN (DIOVAN) 320 MG TABLET    Take 320 mg by " mouth once daily.

## 2019-10-15 ENCOUNTER — HOSPITAL ENCOUNTER (OUTPATIENT)
Dept: RADIOLOGY | Facility: HOSPITAL | Age: 79
Discharge: HOME | End: 2019-10-15
Attending: INTERNAL MEDICINE
Payer: MEDICARE

## 2019-10-15 DIAGNOSIS — C7A.1 MALIGNANT POORLY DIFFERENTIATED NEUROENDOCRINE CARCINOMA: ICD-10-CM

## 2019-10-15 DIAGNOSIS — C77.2 SECONDARY MALIGNANT NEOPLASM OF INTRA-ABDOMINAL LYMPH NODES: ICD-10-CM

## 2019-10-15 DIAGNOSIS — G89.3 CANCER ASSOCIATED PAIN: ICD-10-CM

## 2019-10-15 PROCEDURE — 71046 XR CHEST PA AND LATERAL: ICD-10-PCS | Mod: 26,,, | Performed by: RADIOLOGY

## 2019-10-15 PROCEDURE — 71046 X-RAY EXAM CHEST 2 VIEWS: CPT | Mod: TC,FY

## 2019-10-15 PROCEDURE — 71046 X-RAY EXAM CHEST 2 VIEWS: CPT | Mod: 26,,, | Performed by: RADIOLOGY

## 2019-10-18 ENCOUNTER — TELEPHONE (OUTPATIENT)
Dept: NEUROLOGY | Facility: HOSPITAL | Age: 79
End: 2019-10-18

## 2019-10-18 ENCOUNTER — TELEPHONE (OUTPATIENT)
Dept: CARDIOLOGY | Facility: CLINIC | Age: 79
End: 2019-10-18

## 2019-10-18 NOTE — TELEPHONE ENCOUNTER
Patients son came into clinic today asking if they can be called with the results from his mothers tests when there available. He states that his mother lives in [place] and is to sick to travel. They would prefer a call with the results because they won't be able to make it in.

## 2019-10-18 NOTE — TELEPHONE ENCOUNTER
----- Message from Romelia Barnes sent at 10/18/2019 11:47 AM CDT -----  Contact: Son, Javy  RR-Patient is calling in regards to mother having a difficult time coming to appointments right now due to her health. Javy states patient is a great deal of pain and would like to know if the patient can follow up with her local oncologist and if the doctor can follow up via phone,   Please call Javy back back at [phone].

## 2019-10-18 NOTE — TELEPHONE ENCOUNTER
Spoke to patient's son. I will fax all clinicals to DR. Thacker in [place].  I explained if cardiology clears her for chemo, he can administer chemo, but I will have him call her next week to let her know what he thinks as she can't make it back down due to travel is too stressful and painful for her.

## 2019-10-21 ENCOUNTER — TELEPHONE (OUTPATIENT)
Dept: NEUROLOGY | Facility: HOSPITAL | Age: 79
End: 2019-10-21

## 2019-10-21 NOTE — TELEPHONE ENCOUNTER
Spoke with patient's son today concerning her condition.  She continues to have pain and taking her pain medications sporadically.  She is unsure if she can make it back care for any appointments but is considering on following up with Dr. Garcia in [place].  We have again discussed options for hospice and he is going to speak with his mother about this again and if she does wish to pursue this he will let us know.  If not, she will follow up with Dr. Garcia and we will forward records.

## 2022-05-27 NOTE — TELEPHONE ENCOUNTER
INFECTIOUS DISEASE PROGRESS NOTE    Naveed Hill  36 year old female  MRN : [id number]    Date: 5/27/2022     Attending physician: Bob Smith MD    Reason for consult / chief complaint: bacteremia    Interval history: No overnight events     Subjective: In bed, resting, comfortable. A bit more 02 this AM. Sternal wound pain. No NV, HA or fever.     Vitals:   Vitals:    05/27/22 1000   BP: 127/62   Pulse: 71   Resp: 19   Temp:        PHYSICAL EXAM:  General:  alert, interactive, no distress, resting in bed  HEENT: Normocephalic, atraumatic.   Heart: RRR. Normal S1 S2. Sternal incision CATHY, no drainage, tender    Respiratory: NC 02 4L, no cough or wheeze  Abdomen/GI: Abdomen a bit distended, dec BS, nontender   Skin:  Warm and dry without rash.   MSK/Peripheral: Warm without cyanosis. Right knee with wound VAC  Neuro:  Alert    Current medications:   sodium zirconium cyclosilicate, 10 g, Oral, BID  [START ON 5/28/2022] sodium zirconium cyclosilicate, 10 g, Oral, Daily  amitriptyline, 50 mg, Oral, Nightly  sodium chloride (PF), 2 mL, Intracatheter, 2 times per day  furosemide, 40 mg, Oral, Daily  polyethylene glycol, 17 g, Oral, BID  metoPROLOL tartrate, 25 mg, Oral, 2 times per day  insulin glargine, 20 Units, Subcutaneous, Nightly  ferrous sulfate, 325 mg, Oral, Daily with lunch  insulin lispro, , Subcutaneous, TID WC  insulin lispro, , Subcutaneous, TID WC  gabapentin, 300 mg, Oral, TID  nicotine, 1 patch, Transdermal, Daily  aspirin, 81 mg, Oral, Daily  famotidine, 20 mg, Oral, 2 times per day  docusate sodium-sennosides, 2 tablet, Oral, Daily  bisacodyl, 10 mg, Rectal, Once  sodium biphosphate, 1 enema, Rectal, Once  fluconazole, 200 mg, Oral, Daily  nystatin, , Topical, TID  penicillin G potassium IVPB, 2.5 Million Units, Intravenous, 6 times per day  sodium chloride (PF), 10 mL, Injection, 2 times per day  DULoxetine, 30 mg, Oral, Daily        Laboratory data:    Recent Labs   Lab 05/27/22  0701  pcp not in database, unable to route   05/27/22  0323 05/26/22  1327 05/26/22  0614 05/25/22  0411 05/24/22  1417 05/24/22  0404 05/23/22  1157   WBC  --  10.8  --  14.8* 17.4*  --  14.5* 27.5*   HCT  --  19.9*  --  25.7* 21.1*   < > 19.7* 28.4*   HGB  --  6.0*  --  7.9* 6.7*   < > 6.3* 9.1*   PLT  --  139*  --  167 118*  --  91* 127*   INR  --   --   --   --   --   --  1.1 1.3   PTT  --   --   --   --   --   --   --  31*   SODIUM  --  136  --  135 137  --   --   --    POTASSIUM 4.0 7.9* 4.4 5.6* 5.0   < >  --  4.0   CHLORIDE  --  105  --  101 103  --   --   --    CO2  --  28  --  29 29  --   --   --    CALCIUM  --  7.2*  --  8.8 7.9*  --   --   --    GLUCOSE  --  321*  --  75 81  --   --   --    BUN  --  13  --  18 16  --   --   --    CREATININE  --  0.61  --  0.78 1.01*  --   --   --     < > = values in this interval not displayed.       Imaging:   CT Chest 5/19/22  1. Bilateral pleural effusions which appears partially loculated, stable on  the left and decreased on the right.  2. Innumerable micronodules new from prior with septal thickening and small  consolidative densities. Findings are likely infectious/inflammatory but  nonspecific.  3. Multiple enlarged lymph nodes as above, presumably reactive.    TODD 5/16/22  S/p redo MVR-29 [employer] mosaic porcine valve 4/2017. Bioprosthetic valve is layered with echogenic material and has mobile  echodensities, largest one ~2.4x1.0cm attached to it on the atrial side representing vegetations. Significantly elevated mitral valve  mean gradient 15 mmHg at HR=61 bpm. Mild mitral valve regurgitation.  S/P redo TVR-27 mosaic porcine valve 4/2017. Thickened bioprosthetic tricuspid valve leaflets.Small mobile echodensity visualized  on the tricuspid leaflet (0.6x0.4cm) which may represent a vegetation. Increased mean gradient of the prosthetic tricuspid  valve=5mmHg at HR=77bpm. Moderate eccentric intravalve regurgitation.  Thickened, trileaflet aortic valve. No aortic valve stenosis. No aortic valve  regurgitation. Tiny echodensity visualized on the aortic valve  (0.4x0.2cm) where vegetation cannot be excluded.  Thickened aortic root which may represent underlying infection/abscess cannot be excluded.  Critical results were conveyed to  and .    XR Right Knee 5/13/22  Below-knee and dictation. Osseous spurring at the tibial stump, unchanged.  No osseous erosion or destruction. No knee joint effusion. No soft tissue  abnormality identified radiographically    CXR 5/16/22  1.  Bilateral infrahilar and basilar opacities, likely atelectasis in the setting of hypoinflation.  2.  Mild cardiomegaly, accentuated by technique.    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 Has PICC and home coverage. Unclear what dc dispo is. Will place home care orders for IV abx and line care for at dc once known where she is going.     Group B Strep Bacteremia   Severe Sepsis   - From right BKA stump infection with prepatellar septic bursitis  - Blood clx    5/13: 2/2 with GBS   5/14: NGF   5/16: NGF  - Had PICC placed 5/14 after above + BC   - Pen G 5/17. Will complete IV course while inpt.     Right Knee Wound, Septic Prepatellar Bursitis s/p I&D  - 5/15 Dr Arteaga: I/D (purulence noted)   - Clx:  GBS, C albicans,  lactobacillus   - Wound VAC. Wound Care following.   - Pen G 5/17; Fluconazole 5/17    Bilateral Loculated Pleural Effusions  Suspected Pulmonary Septic Emboli v Thromboembolic   - Noted on CT 5/19: bilateral partially loculated pleural effusions; micronodules and consolidative densities.    Peripheral Vascular Disease, Bilateral BKA and Embolectomies   - Bilateral BKA 5/2017 Dr Bean    Hx Right Finger Amputations    HX IVDU, Substance Abuse    Hx Hepatitis C        * Dr Patrick covering the weekend/holiday 5/28 - 5/30 @ [phone]      Ese Ta NP  Infectious Disease  Office: [phone]  See Provider Directory for Pager #      ID attending      More hypoxemic this morning, also getting PRBC for anemia   Blood culture without any growth   To transfer out of ICU noted  Continue IV penicillin G with plan a 6 weeks course -apparently has coverage for IV penicillin G at home          Please call with any questions.    Sarah Levin MD  5/27/2022    Pager [phone]  Office- [phone]        CC: Dr Bob Smith MD

## 2024-04-09 NOTE — PLAN OF CARE
Patient on oxygen with documented flow.  Will attempt to wean per O2 order protocol.  The proper method of use, as well as anticipated side effects, of this metered-dose inhaler are discussed and demonstrated to the patient.  Will continue to monitor.     Humira Counseling:  I discussed with the patient the risks of adalimumab including but not limited to myelosuppression, immunosuppression, autoimmune hepatitis, demyelinating diseases, lymphoma, and serious infections.  The patient understands that monitoring is required including a PPD at baseline and must alert us or the primary physician if symptoms of infection or other concerning signs are noted. Hydroxyzine Counseling: Patient advised that the medication is sedating and not to drive a car after taking this medication.  Patient informed of potential adverse effects including but not limited to dry mouth, urinary retention, and blurry vision.  The patient verbalized understanding of the proper use and possible adverse effects of hydroxyzine.  All of the patient's questions and concerns were addressed. Wartpeel Counseling:  I discussed with the patient the risks of Wartpeel including but not limited to erythema, scaling, itching, weeping, crusting, and pain. Oral Minoxidil Counseling- I discussed with the patient the risks of oral minoxidil including but not limited to shortness of breath, swelling of the feet or ankles, dizziness, lightheadedness, unwanted hair growth and allergic reaction.  The patient verbalized understanding of the proper use and possible adverse effects of oral minoxidil.  All of the patient's questions and concerns were addressed. Erythromycin Pregnancy And Lactation Text: This medication is Pregnancy Category B and is considered safe during pregnancy. It is also excreted in breast milk. Finasteride Female Counseling: Finasteride Counseling:  I discussed with the patient the risks of use of finasteride including but not limited to decreased libido and sexual dysfunction. Explained the teratogenic nature of the medication and stressed the importance of not getting pregnant during treatment. All of the patient's questions and concerns were addressed. Erivedge Pregnancy And Lactation Text: This medication is Pregnancy Category X and is absolutely contraindicated during pregnancy. It is unknown if it is excreted in breast milk. Rhofade Pregnancy And Lactation Text: This medication has not been assigned a Pregnancy Risk Category. It is unknown if the medication is excreted in breast milk. Cantharidin Counseling:  I discussed with the patient the risks of Cantharidin including but not limited to pain, redness, burning, itching, and blistering. Stelara Counseling:  I discussed with the patient the risks of ustekinumab including but not limited to immunosuppression, malignancy, posterior leukoencephalopathy syndrome, and serious infections.  The patient understands that monitoring is required including a PPD at baseline and must alert us or the primary physician if symptoms of infection or other concerning signs are noted. Sarecycline Counseling: Patient advised regarding possible photosensitivity and discoloration of the teeth, skin, lips, tongue and gums.  Patient instructed to avoid sunlight, if possible.  When exposed to sunlight, patients should wear protective clothing, sunglasses, and sunscreen.  The patient was instructed to call the office immediately if the following severe adverse effects occur:  hearing changes, easy bruising/bleeding, severe headache, or vision changes.  The patient verbalized understanding of the proper use and possible adverse effects of sarecycline.  All of the patient's questions and concerns were addressed. Rituxan Pregnancy And Lactation Text: This medication is Pregnancy Category C and it isn't know if it is safe during pregnancy. It is unknown if this medication is excreted in breast milk but similar antibodies are known to be excreted. Clofazimine Pregnancy And Lactation Text: This medication is Pregnancy Category C and isn't considered safe during pregnancy. It is excreted in breast milk. Itraconazole Pregnancy And Lactation Text: This medication is Pregnancy Category C and it isn't know if it is safe during pregnancy. It is also excreted in breast milk. Sski Pregnancy And Lactation Text: This medication is Pregnancy Category D and isn't considered safe during pregnancy. It is excreted in breast milk. Bimzelx Counseling:  I discussed with the patient the risks of Bimzelx including but not limited to depression, immunosuppression, allergic reactions and infections.  The patient understands that monitoring is required including a PPD at baseline and must alert us or the primary physician if symptoms of infection or other concerning signs are noted. Azithromycin Counseling:  I discussed with the patient the risks of azithromycin including but not limited to GI upset, allergic reaction, drug rash, diarrhea, and yeast infections. Hydroquinone Counseling:  Patient advised that medication may result in skin irritation, lightening (hypopigmentation), dryness, and burning.  In the event of skin irritation, the patient was advised to reduce the amount of the drug applied or use it less frequently.  Rarely, spots that are treated with hydroquinone can become darker (pseudoochronosis).  Should this occur, patient instructed to stop medication and call the office. The patient verbalized understanding of the proper use and possible adverse effects of hydroquinone.  All of the patient's questions and concerns were addressed. Zyclara Pregnancy And Lactation Text: This medication is Pregnancy Category C. It is unknown if this medication is excreted in breast milk. Xeljanz Counseling: I discussed with the patient the risks of Xeljanz therapy including increased risk of infection, liver issues, headache, diarrhea, or cold symptoms. Live vaccines should be avoided. They were instructed to call if they have any problems. Topical Clindamycin Counseling: Patient counseled that this medication may cause skin irritation or allergic reactions.  In the event of skin irritation, the patient was advised to reduce the amount of the drug applied or use it less frequently.   The patient verbalized understanding of the proper use and possible adverse effects of clindamycin.  All of the patient's questions and concerns were addressed. Calcipotriene Pregnancy And Lactation Text: The use of this medication during pregnancy or lactation is not recommended as there is insufficient data. Cellcept Counseling:  I discussed with the patient the risks of mycophenolate mofetil including but not limited to infection/immunosuppression, GI upset, hypokalemia, hypercholesterolemia, bone marrow suppression, lymphoproliferative disorders, malignancy, GI ulceration/bleed/perforation, colitis, interstitial lung disease, kidney failure, progressive multifocal leukoencephalopathy, and birth defects.  The patient understands that monitoring is required including a baseline creatinine and regular CBC testing. In addition, patient must alert us immediately if symptoms of infection or other concerning signs are noted. High Dose Vitamin A Pregnancy And Lactation Text: High dose vitamin A therapy is contraindicated during pregnancy and breast feeding. Hydroxychloroquine Counseling:  I discussed with the patient that a baseline ophthalmologic exam is needed at the start of therapy and every year thereafter while on therapy. A CBC may also be warranted for monitoring.  The side effects of this medication were discussed with the patient, including but not limited to agranulocytosis, aplastic anemia, seizures, rashes, retinopathy, and liver toxicity. Patient instructed to call the office should any adverse effect occur.  The patient verbalized understanding of the proper use and possible adverse effects of Plaquenil.  All the patient's questions and concerns were addressed. Enbrel Pregnancy And Lactation Text: This medication is Pregnancy Category B and is considered safe during pregnancy. It is unknown if this medication is excreted in breast milk. Erythromycin Counseling:  I discussed with the patient the risks of erythromycin including but not limited to GI upset, allergic reaction, drug rash, diarrhea, increase in liver enzymes, and yeast infections. Olanzapine Pregnancy And Lactation Text: This medication is pregnancy category C.   There are no adequate and well controlled trials with olanzapine in pregnant females.  Olanzapine should be used during pregnancy only if the potential benefit justifies the potential risk to the fetus.   In a study in lactating healthy women, olanzapine was excreted in breast milk.  It is recommended that women taking olanzapine should not breast feed. Calcipotriene Counseling:  I discussed with the patient the risks of calcipotriene including but not limited to erythema, scaling, itching, and irritation. Topical Sulfur Applications Pregnancy And Lactation Text: This medication is considered safe during pregnancy and breast feeding secondary to limited systemic absorption. Finasteride Male Counseling: Finasteride Counseling:  I discussed with the patient the risks of use of finasteride including but not limited to decreased libido, decreased ejaculate volume, gynecomastia, and depression. Women should not handle medication.  All of the patient's questions and concerns were addressed. Erivedge Counseling- I discussed with the patient the risks of Erivedge including but not limited to nausea, vomiting, diarrhea, constipation, weight loss, changes in the sense of taste, decreased appetite, muscle spasms, and hair loss.  The patient verbalized understanding of the proper use and possible adverse effects of Erivedge.  All of the patient's questions and concerns were addressed. Skyrizi Pregnancy And Lactation Text: The risk during pregnancy and breastfeeding is uncertain with this medication. Prednisone Pregnancy And Lactation Text: This medication is Pregnancy Category C and it isn't know if it is safe during pregnancy. This medication is excreted in breast milk. Rhofade Counseling: Rhofade is a topical medication which can decrease superficial blood flow where applied. Side effects are uncommon and include stinging, redness and allergic reactions. Rituxan Counseling:  I discussed with the patient the risks of Rituxan infusions. Side effects can include infusion reactions, severe drug rashes including mucocutaneous reactions, reactivation of latent hepatitis and other infections and rarely progressive multifocal leukoencephalopathy.  All of the patient's questions and concerns were addressed. Doxepin Pregnancy And Lactation Text: This medication is Pregnancy Category C and it isn't known if it is safe during pregnancy. It is also excreted in breast milk and breast feeding isn't recommended. Rifampin Pregnancy And Lactation Text: This medication is Pregnancy Category C and it isn't know if it is safe during pregnancy. It is also excreted in breast milk and should not be used if you are breast feeding. Clofazimine Counseling:  I discussed with the patient the risks of clofazimine including but not limited to skin and eye pigmentation, liver damage, nausea/vomiting, gastrointestinal bleeding and allergy. Itraconazole Counseling:  I discussed with the patient the risks of itraconazole including but not limited to liver damage, nausea/vomiting, neuropathy, and severe allergy.  The patient understands that this medication is best absorbed when taken with acidic beverages such as non-diet cola or ginger ale.  The patient understands that monitoring is required including baseline LFTs and repeat LFTs at intervals.  The patient understands that they are to contact us or the primary physician if concerning signs are noted. Zyclara Counseling:  I discussed with the patient the risks of imiquimod including but not limited to erythema, scaling, itching, weeping, crusting, and pain.  Patient understands that the inflammatory response to imiquimod is variable from person to person and was educated regarded proper titration schedule.  If flu-like symptoms develop, patient knows to discontinue the medication and contact us. SSKI Counseling:  I discussed with the patient the risks of SSKI including but not limited to thyroid abnormalities, metallic taste, GI upset, fever, headache, acne, arthralgias, paraesthesias, lymphadenopathy, easy bleeding, arrhythmias, and allergic reaction. Adbry Pregnancy And Lactation Text: It is unknown if this medication will adversely affect pregnancy or breast feeding. Azithromycin Pregnancy And Lactation Text: This medication is considered safe during pregnancy and is also secreted in breast milk. Eucrisa Pregnancy And Lactation Text: This medication has not been assigned a Pregnancy Risk Category but animal studies failed to show danger with the topical medication. It is unknown if the medication is excreted in breast milk. Xelmalikaz Pregnancy And Lactation Text: This medication is Pregnancy Category D and is not considered safe during pregnancy.  The risk during breast feeding is also uncertain. Tazorac Pregnancy And Lactation Text: This medication is not safe during pregnancy. It is unknown if this medication is excreted in breast milk. Glycopyrrolate Pregnancy And Lactation Text: This medication is Pregnancy Category B and is considered safe during pregnancy. It is unknown if it is excreted breast milk. Enbrel Counseling:  I discussed with the patient the risks of etanercept including but not limited to myelosuppression, immunosuppression, autoimmune hepatitis, demyelinating diseases, lymphoma, and infections.  The patient understands that monitoring is required including a PPD at baseline and must alert us or the primary physician if symptoms of infection or other concerning signs are noted. Azathioprine Pregnancy And Lactation Text: This medication is Pregnancy Category D and isn't considered safe during pregnancy. It is unknown if this medication is excreted in breast milk. Olanzapine Counseling- I discussed with the patient the common side effects of olanzapine including but are not limited to: lack of energy, dry mouth, increased appetite, sleepiness, tremor, constipation, dizziness, changes in behavior, or restlessness.  Explained that teenagers are more likely to experience headaches, abdominal pain, pain in the arms or legs, tiredness, and sleepiness.  Serious side effects include but are not limited: increased risk of death in elderly patients who are confused, have memory loss, or dementia-related psychosis; hyperglycemia; increased cholesterol and triglycerides; and weight gain. Doxycycline Pregnancy And Lactation Text: This medication is Pregnancy Category D and not consider safe during pregnancy. It is also excreted in breast milk but is considered safe for shorter treatment courses. Dutasteride Pregnancy And Lactation Text: This medication is absolutely contraindicated in women, especially during pregnancy and breast feeding. Feminization of male fetuses is possible if taking while pregnant. Mirvaso Counseling: Mirvaso is a topical medication which can decrease superficial blood flow where applied. Side effects are uncommon and include stinging, redness and allergic reactions. Topical Sulfur Applications Counseling: Topical Sulfur Counseling: Patient counseled that this medication may cause skin irritation or allergic reactions.  In the event of skin irritation, the patient was advised to reduce the amount of the drug applied or use it less frequently.   The patient verbalized understanding of the proper use and possible adverse effects of topical sulfur application.  All of the patient's questions and concerns were addressed. Prednisone Counseling:  I discussed with the patient the risks of prolonged use of prednisone including but not limited to weight gain, insomnia, osteoporosis, mood changes, diabetes, susceptibility to infection, glaucoma and high blood pressure.  In cases where prednisone use is prolonged, patients should be monitored with blood pressure checks, serum glucose levels and an eye exam.  Additionally, the patient may need to be placed on GI prophylaxis, PCP prophylaxis, and calcium and vitamin D supplementation and/or a bisphosphonate.  The patient verbalized understanding of the proper use and the possible adverse effects of prednisone.  All of the patient's questions and concerns were addressed. Carac Pregnancy And Lactation Text: This medication is Pregnancy Category X and contraindicated in pregnancy and in women who may become pregnant. It is unknown if this medication is excreted in breast milk. Griseofulvin Pregnancy And Lactation Text: This medication is Pregnancy Category X and is known to cause serious birth defects. It is unknown if this medication is excreted in breast milk but breast feeding should be avoided. Doxepin Counseling:  Patient advised that the medication is sedating and not to drive a car after taking this medication. Patient informed of potential adverse effects including but not limited to dry mouth, urinary retention, and blurry vision.  The patient verbalized understanding of the proper use and possible adverse effects of doxepin.  All of the patient's questions and concerns were addressed. Aklief counseling:  Patient advised to apply a pea-sized amount only at bedtime and wait 30 minutes after washing their face before applying.  If too drying, patient may add a non-comedogenic moisturizer.  The most commonly reported side effects including irritation, redness, scaling, dryness, stinging, burning, itching, and increased risk of sunburn.  The patient verbalized understanding of the proper use and possible adverse effects of retinoids.  All of the patient's questions and concerns were addressed. Propranolol Pregnancy And Lactation Text: This medication is Pregnancy Category C and it isn't known if it is safe during pregnancy. It is excreted in breast milk. Zoryve Pregnancy And Lactation Text: It is unknown if this medication can cause problems during pregnancy and breastfeeding. Qbrexza Pregnancy And Lactation Text: There is no available data on Qbrexza use in pregnant women.  There is no available data on Qbrexza use in lactation. Rifampin Counseling: I discussed with the patient the risks of rifampin including but not limited to liver damage, kidney damage, red-orange body fluids, nausea/vomiting and severe allergy. Bactrim Counseling:  I discussed with the patient the risks of sulfa antibiotics including but not limited to GI upset, allergic reaction, drug rash, diarrhea, dizziness, photosensitivity, and yeast infections.  Rarely, more serious reactions can occur including but not limited to aplastic anemia, agranulocytosis, methemoglobinemia, blood dyscrasias, liver or kidney failure, lung infiltrates or desquamative/blistering drug rashes. Tazorac Counseling:  Patient advised that medication is irritating and drying.  Patient may need to apply sparingly and wash off after an hour before eventually leaving it on overnight.  The patient verbalized understanding of the proper use and possible adverse effects of tazorac.  All of the patient's questions and concerns were addressed. Eucrisa Counseling: Patient may experience a mild burning sensation during topical application. Eucrisa is not approved in children less than 3 months of age. Xolair Pregnancy And Lactation Text: This medication is Pregnancy Category B and is considered safe during pregnancy. This medication is excreted in breast milk. Adbry Counseling: I discussed with the patient the risks of tralokinumab including but not limited to eye infection and irritation, cold sores, injection site reactions, worsening of asthma, allergic reactions and increased risk of parasitic infection.  Live vaccines should be avoided while taking tralokinumab. The patient understands that monitoring is required and they must alert us or the primary physician if symptoms of infection or other concerning signs are noted. Glycopyrrolate Counseling:  I discussed with the patient the risks of glycopyrrolate including but not limited to skin rash, drowsiness, dry mouth, difficulty urinating, and blurred vision. Use Enhanced Medication Counseling?: No Dupixent Pregnancy And Lactation Text: This medication likely crosses the placenta but the risk for the fetus is uncertain. This medication is excreted in breast milk. Azathioprine Counseling:  I discussed with the patient the risks of azathioprine including but not limited to myelosuppression, immunosuppression, hepatotoxicity, lymphoma, and infections.  The patient understands that monitoring is required including baseline LFTs, Creatinine, possible TPMP genotyping and weekly CBCs for the first month and then every 2 weeks thereafter.  The patient verbalized understanding of the proper use and possible adverse effects of azathioprine.  All of the patient's questions and concerns were addressed. Topical Steroids Applications Pregnancy And Lactation Text: Most topical steroids are considered safe to use during pregnancy and lactation.  Any topical steroid applied to the breast or nipple should be washed off before breastfeeding. Dutasteride Female Counseling: Dutasteride Counseling:  I discussed with the patient the risks of use of dutasteride including but not limited to decreased libido and sexual dysfunction. Explained the teratogenic nature of the medication and stressed the importance of not getting pregnant during treatment. All of the patient's questions and concerns were addressed. Methotrexate Pregnancy And Lactation Text: This medication is Pregnancy Category X and is known to cause fetal harm. This medication is excreted in breast milk. Cimetidine Pregnancy And Lactation Text: This medication is Pregnancy Category B and is considered safe during pregnancy. It is also excreted in breast milk and breast feeding isn't recommended. Carac Counseling:  I discussed with the patient the risks of Carac including but not limited to erythema, scaling, itching, weeping, crusting, and pain. Nsaids Pregnancy And Lactation Text: These medications are considered safe up to 30 weeks gestation. It is excreted in breast milk. Acitretin Counseling:  I discussed with the patient the risks of acitretin including but not limited to hair loss, dry lips/skin/eyes, liver damage, hyperlipidemia, depression/suicidal ideation, photosensitivity.  Serious rare side effects can include but are not limited to pancreatitis, pseudotumor cerebri, bony changes, clot formation/stroke/heart attack.  Patient understands that alcohol is contraindicated since it can result in liver toxicity and significantly prolong the elimination of the drug by many years. Infliximab Counseling:  I discussed with the patient the risks of infliximab including but not limited to myelosuppression, immunosuppression, autoimmune hepatitis, demyelinating diseases, lymphoma, and serious infections.  The patient understands that monitoring is required including a PPD at baseline and must alert us or the primary physician if symptoms of infection or other concerning signs are noted. Aklief Pregnancy And Lactation Text: It is unknown if this medication is safe to use during pregnancy.  It is unknown if this medication is excreted in breast milk.  Breastfeeding women should use the topical cream on the smallest area of the skin for the shortest time needed while breastfeeding.  Do not apply to nipple and areola. Qbrexza Counseling:  I discussed with the patient the risks of Qbrexza including but not limited to headache, mydriasis, blurred vision, dry eyes, nasal dryness, dry mouth, dry throat, dry skin, urinary hesitation, and constipation.  Local skin reactions including erythema, burning, stinging, and itching can also occur. Arava Counseling:  Patient counseled regarding adverse effects of Arava including but not limited to nausea, vomiting, abnormalities in liver function tests. Patients may develop mouth sores, rash, diarrhea, and abnormalities in blood counts. The patient understands that monitoring is required including LFTs and blood counts.  There is a rare possibility of scarring of the liver and lung problems that can occur when taking methotrexate. Persistent nausea, loss of appetite, pale stools, dark urine, cough, and shortness of breath should be reported immediately. Patient advised to discontinue Arava treatment and consult with a physician prior to attempting conception. The patient will have to undergo a treatment to eliminate Arava from the body prior to conception. Propranolol Counseling:  I discussed with the patient the risks of propranolol including but not limited to low heart rate, low blood pressure, low blood sugar, restlessness and increased cold sensitivity. They should call the office if they experience any of these side effects. Griseofulvin Counseling:  I discussed with the patient the risks of griseofulvin including but not limited to photosensitivity, cytopenia, liver damage, nausea/vomiting and severe allergy.  The patient understands that this medication is best absorbed when taken with a fatty meal (e.g., ice cream or french fries). Bactrim Pregnancy And Lactation Text: This medication is Pregnancy Category D and is known to cause fetal risk.  It is also excreted in breast milk. Zoryve Counseling:  I discussed with the patient that Zoryve is not for use in the eyes, mouth or vagina. The most commonly reported side effects include diarrhea, headache, insomnia, application site pain, upper respiratory tract infections, and urinary tract infections.  All of the patient's questions and concerns were addressed. Xolair Counseling:  Patient informed of potential adverse effects including but not limited to fever, muscle aches, rash and allergic reactions.  The patient verbalized understanding of the proper use and possible adverse effects of Xolair.  All of the patient's questions and concerns were addressed. Dupixent Counseling: I discussed with the patient the risks of dupilumab including but not limited to eye infection and irritation, cold sores, injection site reactions, worsening of asthma, allergic reactions and increased risk of parasitic infection.  Live vaccines should be avoided while taking dupilumab. Dupilumab will also interact with certain medications such as warfarin and cyclosporine. The patient understands that monitoring is required and they must alert us or the primary physician if symptoms of infection or other concerning signs are noted. Valtrex Pregnancy And Lactation Text: this medication is Pregnancy Category B and is considered safe during pregnancy. This medication is not directly found in breast milk but it's metabolite acyclovir is present. Minoxidil Counseling: Minoxidil is a topical medication which can increase blood flow where it is applied. It is uncertain how this medication increases hair growth. Side effects are uncommon and include stinging and allergic reactions. Topical Steroids Counseling: I discussed with the patient that prolonged use of topical steroids can result in the increased appearance of superficial blood vessels (telangiectasias), lightening (hypopigmentation) and thinning of the skin (atrophy).  Patient understands to avoid using high potency steroids in skin folds, the groin or the face.  The patient verbalized understanding of the proper use and possible adverse effects of topical steroids.  All of the patient's questions and concerns were addressed. Dutasteride Male Counseling: Dustasteride Counseling:  I discussed with the patient the risks of use of dutasteride including but not limited to decreased libido, decreased ejaculate volume, and gynecomastia. Women who can become pregnant should not handle medication.  All of the patient's questions and concerns were addressed. Ivermectin Pregnancy And Lactation Text: This medication is Pregnancy Category C and it isn't known if it is safe during pregnancy. It is also excreted in breast milk. Nsaids Counseling: NSAID Counseling: I discussed with the patient that NSAIDs should be taken with food. Prolonged use of NSAIDs can result in the development of stomach ulcers.  Patient advised to stop taking NSAIDs if abdominal pain occurs.  The patient verbalized understanding of the proper use and possible adverse effects of NSAIDs.  All of the patient's questions and concerns were addressed. Acitretin Pregnancy And Lactation Text: This medication is Pregnancy Category X and should not be given to women who are pregnant or may become pregnant in the future. This medication is excreted in breast milk. Cimetidine Counseling:  I discussed with the patient the risks of Cimetidine including but not limited to gynecomastia, headache, diarrhea, nausea, drowsiness, arrhythmias, pancreatitis, skin rashes, psychosis, bone marrow suppression and kidney toxicity. Azelaic Acid Counseling: Patient counseled that medicine may cause skin irritation and to avoid applying near the eyes.  In the event of skin irritation, the patient was advised to reduce the amount of the drug applied or use it less frequently.   The patient verbalized understanding of the proper use and possible adverse effects of azelaic acid.  All of the patient's questions and concerns were addressed. Protopic Pregnancy And Lactation Text: This medication is Pregnancy Category C. It is unknown if this medication is excreted in breast milk when applied topically. Methotrexate Counseling:  Patient counseled regarding adverse effects of methotrexate including but not limited to nausea, vomiting, abnormalities in liver function tests. Patients may develop mouth sores, rash, diarrhea, and abnormalities in blood counts. The patient understands that monitoring is required including LFT's and blood counts.  There is a rare possibility of scarring of the liver and lung problems that can occur when taking methotrexate. Persistent nausea, loss of appetite, pale stools, dark urine, cough, and shortness of breath should be reported immediately. Patient advised to discontinue methotrexate treatment at least three months before attempting to become pregnant.  I discussed the need for folate supplements while taking methotrexate.  These supplements can decrease side effects during methotrexate treatment. The patient verbalized understanding of the proper use and possible adverse effects of methotrexate.  All of the patient's questions and concerns were addressed. Benzoyl Peroxide Pregnancy And Lactation Text: This medication is Pregnancy Category C. It is unknown if benzoyl peroxide is excreted in breast milk. Quinolones Counseling:  I discussed with the patient the risks of fluoroquinolones including but not limited to GI upset, allergic reaction, drug rash, diarrhea, dizziness, photosensitivity, yeast infections, liver function test abnormalities, tendonitis/tendon rupture. Cephalexin Counseling: I counseled the patient regarding use of cephalexin as an antibiotic for prophylactic and/or therapeutic purposes. Cephalexin (commonly prescribed under brand name Keflex) is a cephalosporin antibiotic which is active against numerous classes of bacteria, including most skin bacteria. Side effects may include nausea, diarrhea, gastrointestinal upset, rash, hives, yeast infections, and in rare cases, hepatitis, kidney disease, seizures, fever, confusion, neurologic symptoms, and others. Patients with severe allergies to penicillin medications are cautioned that there is about a 10% incidence of cross-reactivity with cephalosporins. When possible, patients with penicillin allergies should use alternatives to cephalosporins for antibiotic therapy. Spironolactone Pregnancy And Lactation Text: This medication can cause feminization of the male fetus and should be avoided during pregnancy. The active metabolite is also found in breast milk. Elidel Counseling: Patient may experience a mild burning sensation during topical application. Elidel is not approved in children less than 2 years of age. There have been case reports of hematologic and skin malignancies in patients using topical calcineurin inhibitors although causality is questionable. Oxybutynin Pregnancy And Lactation Text: This medication is Pregnancy Category B and is considered safe during pregnancy. It is unknown if it is excreted in breast milk. Skyrizi Counseling: I discussed with the patient the risks of risankizumab-rzaa including but not limited to immunosuppression, and serious infections.  The patient understands that monitoring is required including a PPD at baseline and must alert us or the primary physician if symptoms of infection or other concerning signs are noted. Topical Retinoid counseling:  Patient advised to apply a pea-sized amount only at bedtime and wait 30 minutes after washing their face before applying.  If too drying, patient may add a non-comedogenic moisturizer. The patient verbalized understanding of the proper use and possible adverse effects of retinoids.  All of the patient's questions and concerns were addressed. Gabapentin Counseling: I discussed with the patient the risks of gabapentin including but not limited to dizziness, somnolence, fatigue and ataxia. Valtrex Counseling: I discussed with the patient the risks of valacyclovir including but not limited to kidney damage, nausea, vomiting and severe allergy.  The patient understands that if the infection seems to be worsening or is not improving, they are to call. Opioid Pregnancy And Lactation Text: These medications can lead to premature delivery and should be avoided during pregnancy. These medications are also present in breast milk in small amounts. Klisyri Pregnancy And Lactation Text: It is unknown if this medication can harm a developing fetus or if it is excreted in breast milk. Olumiant Counseling: I discussed with the patient the risks of Olumiant therapy including but not limited to upper respiratory tract infections, shingles, cold sores, and nausea. Live vaccines should be avoided.  This medication has been linked to serious infections; higher rate of mortality; malignancy and lymphoproliferative disorders; major adverse cardiovascular events; thrombosis; gastrointestinal perforations; neutropenia; lymphopenia; anemia; liver enzyme elevations; and lipid elevations. Olumiant Pregnancy And Lactation Text: Based on animal studies, Olumiant may cause embryo-fetal harm when administered to pregnant women.  The medication should not be used in pregnancy.  Breastfeeding is not recommended during treatment. Ivermectin Counseling:  Patient instructed to take medication on an empty stomach with a full glass of water.  Patient informed of potential adverse effects including but not limited to nausea, diarrhea, dizziness, itching, and swelling of the extremities or lymph nodes.  The patient verbalized understanding of the proper use and possible adverse effects of ivermectin.  All of the patient's questions and concerns were addressed. Niacinamide Pregnancy And Lactation Text: These medications are considered safe during pregnancy. Fluconazole Counseling:  Patient counseled regarding adverse effects of fluconazole including but not limited to headache, diarrhea, nausea, upset stomach, liver function test abnormalities, taste disturbance, and stomach pain.  There is a rare possibility of liver failure that can occur when taking fluconazole.  The patient understands that monitoring of LFTs and kidney function test may be required, especially at baseline. The patient verbalized understanding of the proper use and possible adverse effects of fluconazole.  All of the patient's questions and concerns were addressed. Ilumya Counseling: I discussed with the patient the risks of tildrakizumab including but not limited to immunosuppression, malignancy, posterior leukoencephalopathy syndrome, and serious infections.  The patient understands that monitoring is required including a PPD at baseline and must alert us or the primary physician if symptoms of infection or other concerning signs are noted. Bexarotene Counseling:  I discussed with the patient the risks of bexarotene including but not limited to hair loss, dry lips/skin/eyes, liver abnormalities, hyperlipidemia, pancreatitis, depression/suicidal ideation, photosensitivity, drug rash/allergic reactions, hypothyroidism, anemia, leukopenia, infection, cataracts, and teratogenicity.  Patient understands that they will need regular blood tests to check lipid profile, liver function tests, white blood cell count, thyroid function tests and pregnancy test if applicable. Topical Metronidazole Pregnancy And Lactation Text: This medication is Pregnancy Category B and considered safe during pregnancy.  It is also considered safe to use while breastfeeding. Benzoyl Peroxide Counseling: Patient counseled that medicine may cause skin irritation and bleach clothing.  In the event of skin irritation, the patient was advised to reduce the amount of the drug applied or use it less frequently.   The patient verbalized understanding of the proper use and possible adverse effects of benzoyl peroxide.  All of the patient's questions and concerns were addressed. Azelaic Acid Pregnancy And Lactation Text: This medication is considered safe during pregnancy and breast feeding. Detail Level: Simple VTAMA Counseling: I discussed with the patient that VTAMA is not for use in the eyes, mouth or mouth. They should call the office if they develop any signs of allergic reactions to VTAMA. The patient verbalized understanding of the proper use and possible adverse effects of VTAMA.  All of the patient's questions and concerns were addressed. Protopic Counseling: Patient may experience a mild burning sensation during topical application. Protopic is not approved in children less than 2 years of age. There have been case reports of hematologic and skin malignancies in patients using topical calcineurin inhibitors although causality is questionable. Cephalexin Pregnancy And Lactation Text: This medication is Pregnancy Category B and considered safe during pregnancy.  It is also excreted in breast milk but can be used safely for shorter doses. Spironolactone Counseling: Patient advised regarding risks of diarrhea, abdominal pain, hyperkalemia, birth defects (for female patients), liver toxicity and renal toxicity. The patient may need blood work to monitor liver and kidney function and potassium levels while on therapy. The patient verbalized understanding of the proper use and possible adverse effects of spironolactone.  All of the patient's questions and concerns were addressed. Minocycline Pregnancy And Lactation Text: This medication is Pregnancy Category D and not consider safe during pregnancy. It is also excreted in breast milk. Tremfya Counseling: I discussed with the patient the risks of guselkumab including but not limited to immunosuppression, serious infections, and drug reactions.  The patient understands that monitoring is required including a PPD at baseline and must alert us or the primary physician if symptoms of infection or other concerning signs are noted. Oxybutynin Counseling:  I discussed with the patient the risks of oxybutynin including but not limited to skin rash, drowsiness, dry mouth, difficulty urinating, and blurred vision. Soolantra Pregnancy And Lactation Text: This medication is Pregnancy Category C. This medication is considered safe during breast feeding. Dapsone Pregnancy And Lactation Text: This medication is Pregnancy Category C and is not considered safe during pregnancy or breast feeding. Klisyri Counseling:  I discussed with the patient the risks of Klisyri including but not limited to erythema, scaling, itching, weeping, crusting, and pain. Tranexamic Acid Pregnancy And Lactation Text: It is unknown if this medication is safe during pregnancy or breast feeding. Cosentyx Counseling:  I discussed with the patient the risks of Cosentyx including but not limited to worsening of Crohn's disease, immunosuppression, allergic reactions and infections.  The patient understands that monitoring is required including a PPD at baseline and must alert us or the primary physician if symptoms of infection or other concerning signs are noted. Rinvoq Counseling: I discussed with the patient the risks of Rinvoq therapy including but not limited to upper respiratory tract infections, shingles, cold sores, bronchitis, nausea, cough, fever, acne, and headache. Live vaccines should be avoided.  This medication has been linked to serious infections; higher rate of mortality; malignancy and lymphoproliferative disorders; major adverse cardiovascular events; thrombosis; thrombocytopenia, anemia, and neutropenia; lipid elevations; liver enzyme elevations; and gastrointestinal perforations. Niacinamide Counseling: I recommended taking niacin or niacinamide, also know as vitamin B3, twice daily. Recent evidence suggests that taking vitamin B3 (500 mg twice daily) can reduce the risk of actinic keratoses and non-melanoma skin cancers. Side effects of vitamin B3 include flushing and headache. Bexarotene Pregnancy And Lactation Text: This medication is Pregnancy Category X and should not be given to women who are pregnant or may become pregnant. This medication should not be used if you are breast feeding. Topical Metronidazole Counseling: Metronidazole is a topical antibiotic medication. You may experience burning, stinging, redness, or allergic reactions.  Please call our office if you develop any problems from using this medication. Litfulo Pregnancy And Lactation Text: Based on animal studies, Lifulo may cause embryo-fetal harm when administered to pregnant women.  The medication should not be used in pregnancy.  Breastfeeding is not recommended during treatment. Winlevi Pregnancy And Lactation Text: This medication is considered safe during pregnancy and breastfeeding. Cyclosporine Counseling:  I discussed with the patient the risks of cyclosporine including but not limited to hypertension, gingival hyperplasia,myelosuppression, immunosuppression, liver damage, kidney damage, neurotoxicity, lymphoma, and serious infections. The patient understands that monitoring is required including baseline blood pressure, CBC, CMP, lipid panel and uric acid, and then 1-2 times monthly CMP and blood pressure. Clindamycin Counseling: I counseled the patient regarding use of clindamycin as an antibiotic for prophylactic and/or therapeutic purposes. Clindamycin is active against numerous classes of bacteria, including skin bacteria. Side effects may include nausea, diarrhea, gastrointestinal upset, rash, hives, yeast infections, and in rare cases, colitis. Birth Control Pills Pregnancy And Lactation Text: This medication should be avoided if pregnant and for the first 30 days post-partum. Odomzo Counseling- I discussed with the patient the risks of Odomzo including but not limited to nausea, vomiting, diarrhea, constipation, weight loss, changes in the sense of taste, decreased appetite, muscle spasms, and hair loss.  The patient verbalized understanding of the proper use and possible adverse effects of Odomzo.  All of the patient's questions and concerns were addressed. Otezla Pregnancy And Lactation Text: This medication is Pregnancy Category C and it isn't known if it is safe during pregnancy. It is unknown if it is excreted in breast milk. Simponi Counseling:  I discussed with the patient the risks of golimumab including but not limited to myelosuppression, immunosuppression, autoimmune hepatitis, demyelinating diseases, lymphoma, and serious infections.  The patient understands that monitoring is required including a PPD at baseline and must alert us or the primary physician if symptoms of infection or other concerning signs are noted. Minocycline Counseling: Patient advised regarding possible photosensitivity and discoloration of the teeth, skin, lips, tongue and gums.  Patient instructed to avoid sunlight, if possible.  When exposed to sunlight, patients should wear protective clothing, sunglasses, and sunscreen.  The patient was instructed to call the office immediately if the following severe adverse effects occur:  hearing changes, easy bruising/bleeding, severe headache, or vision changes.  The patient verbalized understanding of the proper use and possible adverse effects of minocycline.  All of the patient's questions and concerns were addressed. Dapsone Counseling: I discussed with the patient the risks of dapsone including but not limited to hemolytic anemia, agranulocytosis, rashes, methemoglobinemia, kidney failure, peripheral neuropathy, headaches, GI upset, and liver toxicity.  Patients who start dapsone require monitoring including baseline LFTs and weekly CBCs for the first month, then every month thereafter.  The patient verbalized understanding of the proper use and possible adverse effects of dapsone.  All of the patient's questions and concerns were addressed. Terbinafine Counseling: Patient counseling regarding adverse effects of terbinafine including but not limited to headache, diarrhea, rash, upset stomach, liver function test abnormalities, itching, taste/smell disturbance, nausea, abdominal pain, and flatulence.  There is a rare possibility of liver failure that can occur when taking terbinafine.  The patient understands that a baseline LFT and kidney function test may be required. The patient verbalized understanding of the proper use and possible adverse effects of terbinafine.  All of the patient's questions and concerns were addressed. Drysol Counseling:  I discussed with the patient the risks of drysol/aluminum chloride including but not limited to skin rash, itching, irritation, burning. Soolantra Counseling: I discussed with the patients the risks of topial Soolantra. This is a medicine which decreases the number of mites and inflammation in the skin. You experience burning, stinging, eye irritation or allergic reactions.  Please call our office if you develop any problems from using this medication. Tranexamic Acid Counseling:  Patient advised of the small risk of bleeding problems with tranexamic acid. They were also instructed to call if they developed any nausea, vomiting or diarrhea. All of the patient's questions and concerns were addressed. Cimzia Pregnancy And Lactation Text: This medication crosses the placenta but can be considered safe in certain situations. Cimzia may be excreted in breast milk. Albendazole Counseling:  I discussed with the patient the risks of albendazole including but not limited to cytopenia, kidney damage, nausea/vomiting and severe allergy.  The patient understands that this medication is being used in an off-label manner. Rinvoq Pregnancy And Lactation Text: Based on animal studies, Rinvoq may cause embryo-fetal harm when administered to pregnant women.  The medication should not be used in pregnancy.  Breastfeeding is not recommended during treatment and for 6 days after the last dose. Low Dose Naltrexone Pregnancy And Lactation Text: Naltrexone is pregnancy category C.  There have been no adequate and well-controlled studies in pregnant women.  It should be used in pregnancy only if the potential benefit justifies the potential risk to the fetus.   Limited data indicates that naltrexone is minimally excreted into breastmilk. Isotretinoin Counseling: Patient should get monthly blood tests, not donate blood, not drive at night if vision affected, not share medication, and not undergo elective surgery for 6 months after tx completed. Side effects reviewed, pt to contact office should one occur. Litfulo Counseling: I discussed with the patient the risks of Litfulo therapy including but not limited to upper respiratory tract infections, shingles, cold sores, and nausea. Live vaccines should be avoided.  This medication has been linked to serious infections; higher rate of mortality; malignancy and lymphoproliferative disorders; major adverse cardiovascular events; thrombosis; gastrointestinal perforations; neutropenia; lymphopenia; anemia; liver enzyme elevations; and lipid elevations. Picato Counseling:  I discussed with the patient the risks of Picato including but not limited to erythema, scaling, itching, weeping, crusting, and pain. Cyclophosphamide Pregnancy And Lactation Text: This medication is Pregnancy Category D and it isn't considered safe during pregnancy. This medication is excreted in breast milk. Birth Control Pills Counseling: Birth Control Pill Counseling: I discussed with the patient the potential side effects of OCPs including but not limited to increased risk of stroke, heart attack, thrombophlebitis, deep venous thrombosis, hepatic adenomas, breast changes, GI upset, headaches, and depression.  The patient verbalized understanding of the proper use and possible adverse effects of OCPs. All of the patient's questions and concerns were addressed. Clindamycin Pregnancy And Lactation Text: This medication can be used in pregnancy if certain situations. Clindamycin is also present in breast milk. Metronidazole Pregnancy And Lactation Text: This medication is Pregnancy Category B and considered safe during pregnancy.  It is also excreted in breast milk. Libtayo Pregnancy And Lactation Text: This medication is contraindicated in pregnancy and when breast feeding. Hyrimoz Counseling:  I discussed with the patient the risks of adalimumab including but not limited to myelosuppression, immunosuppression, autoimmune hepatitis, demyelinating diseases, lymphoma, and serious infections.  The patient understands that monitoring is required including a PPD at baseline and must alert us or the primary physician if symptoms of infection or other concerning signs are noted. Otezla Counseling: The side effects of Otezla were discussed with the patient, including but not limited to worsening or new depression, weight loss, diarrhea, nausea, upper respiratory tract infection, and headache. Patient instructed to call the office should any adverse effect occur.  The patient verbalized understanding of the proper use and possible adverse effects of Otezla.  All the patient's questions and concerns were addressed. Ketoconazole Pregnancy And Lactation Text: This medication is Pregnancy Category C and it isn't know if it is safe during pregnancy. It is also excreted in breast milk and breast feeding isn't recommended. Winlevi Counseling:  I discussed with the patient the risks of topical clascoterone including but not limited to erythema, scaling, itching, and stinging. Patient voiced their understanding. Taltz Counseling: I discussed with the patient the risks of ixekizumab including but not limited to immunosuppression, serious infections, worsening of inflammatory bowel disease and drug reactions.  The patient understands that monitoring is required including a PPD at baseline and must alert us or the primary physician if symptoms of infection or other concerning signs are noted. Solaraze Pregnancy And Lactation Text: This medication is Pregnancy Category B and is considered safe. There is some data to suggest avoiding during the third trimester. It is unknown if this medication is excreted in breast milk. Tetracycline Counseling: Patient counseled regarding possible photosensitivity and increased risk for sunburn.  Patient instructed to avoid sunlight, if possible.  When exposed to sunlight, patients should wear protective clothing, sunglasses, and sunscreen.  The patient was instructed to call the office immediately if the following severe adverse effects occur:  hearing changes, easy bruising/bleeding, severe headache, or vision changes.  The patient verbalized understanding of the proper use and possible adverse effects of tetracycline.  All of the patient's questions and concerns were addressed. Patient understands to avoid pregnancy while on therapy due to potential birth defects. Cimzia Counseling:  I discussed with the patient the risks of Cimzia including but not limited to immunosuppression, allergic reactions and infections.  The patient understands that monitoring is required including a PPD at baseline and must alert us or the primary physician if symptoms of infection or other concerning signs are noted. Imiquimod Counseling:  I discussed with the patient the risks of imiquimod including but not limited to erythema, scaling, itching, weeping, crusting, and pain.  Patient understands that the inflammatory response to imiquimod is variable from person to person and was educated regarded proper titration schedule.  If flu-like symptoms develop, patient knows to discontinue the medication and contact us. Sotyktu Counseling:  I discussed the most common side effects of Sotyktu including: common cold, sore throat, sinus infections, cold sores, canker sores, folliculitis, and acne.? I also discussed more serious side effects of Sotyktu including but not limited to: serious allergic reactions; increased risk for infections such as TB; cancers such as lymphomas; rhabdomyolysis and elevated CPK; and elevated triglycerides and liver enzymes.? Topical Ketoconazole Counseling: Patient counseled that this medication may cause skin irritation or allergic reactions.  In the event of skin irritation, the patient was advised to reduce the amount of the drug applied or use it less frequently.   The patient verbalized understanding of the proper use and possible adverse effects of ketoconazole.  All of the patient's questions and concerns were addressed. Low Dose Naltrexone Counseling- I discussed with the patient the potential risks and side effects of low dose naltrexone including but not limited to: more vivid dreams, headaches, nausea, vomiting, abdominal pain, fatigue, dizziness, and anxiety. Isotretinoin Pregnancy And Lactation Text: This medication is Pregnancy Category X and is considered extremely dangerous during pregnancy. It is unknown if it is excreted in breast milk. Cibinqo Pregnancy And Lactation Text: It is unknown if this medication will adversely affect pregnancy or breast feeding.  You should not take this medication if you are currently pregnant or planning a pregnancy or while breastfeeding. Opzelura Pregnancy And Lactation Text: There is insufficient data to evaluate drug-associated risk for major birth defects, miscarriage, or other adverse maternal or fetal outcomes.  There is a pregnancy registry that monitors pregnancy outcomes in pregnant persons exposed to the medication during pregnancy.  It is unknown if this medication is excreted in breast milk.  Do not breastfeed during treatment and for about 4 weeks after the last dose. Opioid Counseling: I discussed with the patient the potential side effects of opioids including but not limited to addiction, altered mental status, and depression. I stressed avoiding alcohol, benzodiazepines, muscle relaxants and sleep aids unless specifically okayed by a physician. The patient verbalized understanding of the proper use and possible adverse effects of opioids. All of the patient's questions and concerns were addressed. They were instructed to flush the remaining pills down the toilet if they did not need them for pain. Cyclophosphamide Counseling:  I discussed with the patient the risks of cyclophosphamide including but not limited to hair loss, hormonal abnormalities, decreased fertility, abdominal pain, diarrhea, nausea and vomiting, bone marrow suppression and infection. The patient understands that monitoring is required while taking this medication. Finasteride Pregnancy And Lactation Text: This medication is absolutely contraindicated during pregnancy. It is unknown if it is excreted in breast milk. Metronidazole Counseling:  I discussed with the patient the risks of metronidazole including but not limited to seizures, nausea/vomiting, a metallic taste in the mouth, nausea/vomiting and severe allergy. Oral Minoxidil Pregnancy And Lactation Text: This medication should only be used when clearly needed if you are pregnant, attempting to become pregnant or breast feeding. Hydroxyzine Pregnancy And Lactation Text: This medication is not safe during pregnancy and should not be taken. It is also excreted in breast milk and breast feeding isn't recommended. Doxycycline Counseling:  Patient counseled regarding possible photosensitivity and increased risk for sunburn.  Patient instructed to avoid sunlight, if possible.  When exposed to sunlight, patients should wear protective clothing, sunglasses, and sunscreen.  The patient was instructed to call the office immediately if the following severe adverse effects occur:  hearing changes, easy bruising/bleeding, severe headache, or vision changes.  The patient verbalized understanding of the proper use and possible adverse effects of doxycycline.  All of the patient's questions and concerns were addressed. 5-Fu Counseling: 5-Fluorouracil Counseling:  I discussed with the patient the risks of 5-fluorouracil including but not limited to erythema, scaling, itching, weeping, crusting, and pain. Libtayo Counseling- I discussed with the patient the risks of Libtayo including but not limited to nausea, vomiting, diarrhea, and bone or muscle pain.  The patient verbalized understanding of the proper use and possible adverse effects of Libtayo.  All of the patient's questions and concerns were addressed. Solaraze Counseling:  I discussed with the patient the risks of Solaraze including but not limited to erythema, scaling, itching, weeping, crusting, and pain. Ketoconazole Counseling:   Patient counseled regarding improving absorption with orange juice.  Adverse effects include but are not limited to breast enlargement, headache, diarrhea, nausea, upset stomach, liver function test abnormalities, taste disturbance, and stomach pain.  There is a rare possibility of liver failure that can occur when taking ketoconazole. The patient understands that monitoring of LFTs may be required, especially at baseline. The patient verbalized understanding of the proper use and possible adverse effects of ketoconazole.  All of the patient's questions and concerns were addressed. Siliq Counseling:  I discussed with the patient the risks of Siliq including but not limited to new or worsening depression, suicidal thoughts and behavior, immunosuppression, malignancy, posterior leukoencephalopathy syndrome, and serious infections.  The patient understands that monitoring is required including a PPD at baseline and must alert us or the primary physician if symptoms of infection or other concerning signs are noted. There is also a special program designed to monitor depression which is required with Siliq. Colchicine Counseling:  Patient counseled regarding adverse effects including but not limited to stomach upset (nausea, vomiting, stomach pain, or diarrhea).  Patient instructed to limit alcohol consumption while taking this medication.  Colchicine may reduce blood counts especially with prolonged use.  The patient understands that monitoring of kidney function and blood counts may be required, especially at baseline. The patient verbalized understanding of the proper use and possible adverse effects of colchicine.  All of the patient's questions and concerns were addressed. Thalidomide Counseling: I discussed with the patient the risks of thalidomide including but not limited to birth defects, anxiety, weakness, chest pain, dizziness, cough and severe allergy. Bimzelx Pregnancy And Lactation Text: This medication crosses the placenta and the safety is uncertain during pregnancy. It is unknown if this medication is present in breast milk. Sotyktu Pregnancy And Lactation Text: There is insufficient data to evaluate whether or not Sotyktu is safe to use during pregnancy.? ?It is not known if Sotyktu passes into breast milk and whether or not it is safe to use when breastfeeding.?? Cantharidin Pregnancy And Lactation Text: This medication has not been proven safe during pregnancy. It is unknown if this medication is excreted in breast milk. Hydroxychloroquine Pregnancy And Lactation Text: This medication has been shown to cause fetal harm but it isn't assigned a Pregnancy Risk Category. There are small amounts excreted in breast milk. High Dose Vitamin A Counseling: Side effects reviewed, pt to contact office should one occur. Cibinqo Counseling: I discussed with the patient the risks of Cibinqo therapy including but not limited to common cold, nausea, headache, cold sores, increased blood CPK levels, dizziness, UTIs, fatigue, acne, and vomitting. Live vaccines should be avoided.  This medication has been linked to serious infections; higher rate of mortality; malignancy and lymphoproliferative disorders; major adverse cardiovascular events; thrombosis; thrombocytopenia and lymphopenia; lipid elevations; and retinal detachment. Opzelura Counseling:  I discussed with the patient the risks of Opzelura including but not limited to nasopharngitis, bronchitis, ear infection, eosinophila, hives, diarrhea, folliculitis, tonsillitis, and rhinorrhea.  Taken orally, this medication has been linked to serious infections; higher rate of mortality; malignancy and lymphoproliferative disorders; major adverse cardiovascular events; thrombosis; thrombocytopenia, anemia, and neutropenia; and lipid elevations.